# Patient Record
Sex: FEMALE | Race: BLACK OR AFRICAN AMERICAN | NOT HISPANIC OR LATINO | Employment: FULL TIME | URBAN - METROPOLITAN AREA
[De-identification: names, ages, dates, MRNs, and addresses within clinical notes are randomized per-mention and may not be internally consistent; named-entity substitution may affect disease eponyms.]

---

## 2021-10-20 ENCOUNTER — TELEPHONE (OUTPATIENT)
Dept: NEUROSURGERY | Facility: CLINIC | Age: 61
End: 2021-10-20

## 2021-10-20 NOTE — TELEPHONE ENCOUNTER
----- Message from Orestes Jennings sent at 10/20/2021 10:26 AM CDT -----  Contact: pt  Who Called: PT  Regarding: The pt is calling to schedule an initial appointment with the provider for lumbar spinal pain. Please contact the pt for scheduling.   Would the patient rather a call back or a response via [facility]? Call back  Best Call Back Number: [phone]  Additional Information:

## 2021-10-25 ENCOUNTER — LAB VISIT (OUTPATIENT)
Dept: LAB | Facility: HOSPITAL | Age: 61
End: 2021-10-25
Attending: STUDENT IN AN ORGANIZED HEALTH CARE EDUCATION/TRAINING PROGRAM
Payer: COMMERCIAL

## 2021-10-25 ENCOUNTER — OFFICE VISIT (OUTPATIENT)
Dept: NEUROSURGERY | Facility: CLINIC | Age: 61
End: 2021-10-25
Payer: COMMERCIAL

## 2021-10-25 ENCOUNTER — OFFICE VISIT (OUTPATIENT)
Dept: INTERNAL MEDICINE | Facility: CLINIC | Age: 61
End: 2021-10-25
Payer: COMMERCIAL

## 2021-10-25 VITALS
HEART RATE: 77 BPM | WEIGHT: 186.06 LBS | SYSTOLIC BLOOD PRESSURE: 130 MMHG | HEIGHT: 63 IN | OXYGEN SATURATION: 96 % | BODY MASS INDEX: 32.97 KG/M2 | DIASTOLIC BLOOD PRESSURE: 72 MMHG

## 2021-10-25 VITALS — DIASTOLIC BLOOD PRESSURE: 65 MMHG | HEART RATE: 81 BPM | SYSTOLIC BLOOD PRESSURE: 116 MMHG

## 2021-10-25 DIAGNOSIS — E08.00 DIABETES MELLITUS DUE TO UNDERLYING CONDITION WITH HYPEROSMOLARITY WITHOUT COMA, WITHOUT LONG-TERM CURRENT USE OF INSULIN: Primary | ICD-10-CM

## 2021-10-25 DIAGNOSIS — Z00.00 ANNUAL PHYSICAL EXAM: ICD-10-CM

## 2021-10-25 DIAGNOSIS — M43.16 SPONDYLOLISTHESIS AT L4-L5 LEVEL: Primary | ICD-10-CM

## 2021-10-25 DIAGNOSIS — E08.00 DIABETES MELLITUS DUE TO UNDERLYING CONDITION WITH HYPEROSMOLARITY WITHOUT COMA, WITHOUT LONG-TERM CURRENT USE OF INSULIN: ICD-10-CM

## 2021-10-25 LAB
ALBUMIN SERPL BCP-MCNC: 3.9 G/DL (ref 3.5–5.2)
ALP SERPL-CCNC: 67 U/L (ref 55–135)
ALT SERPL W/O P-5'-P-CCNC: 15 U/L (ref 10–44)
ANION GAP SERPL CALC-SCNC: 8 MMOL/L (ref 8–16)
AST SERPL-CCNC: 22 U/L (ref 10–40)
BILIRUB SERPL-MCNC: 0.6 MG/DL (ref 0.1–1)
BUN SERPL-MCNC: 13 MG/DL (ref 8–23)
CALCIUM SERPL-MCNC: 9.9 MG/DL (ref 8.7–10.5)
CHLORIDE SERPL-SCNC: 103 MMOL/L (ref 95–110)
CHOLEST SERPL-MCNC: 230 MG/DL (ref 120–199)
CHOLEST/HDLC SERPL: 3.1 {RATIO} (ref 2–5)
CO2 SERPL-SCNC: 28 MMOL/L (ref 23–29)
CREAT SERPL-MCNC: 0.9 MG/DL (ref 0.5–1.4)
EST. GFR  (AFRICAN AMERICAN): >60 ML/MIN/1.73 M^2
EST. GFR  (NON AFRICAN AMERICAN): >60 ML/MIN/1.73 M^2
ESTIMATED AVG GLUCOSE: 171 MG/DL (ref 68–131)
GLUCOSE SERPL-MCNC: 129 MG/DL (ref 70–110)
HBA1C MFR BLD: 7.6 % (ref 4–5.6)
HDLC SERPL-MCNC: 75 MG/DL (ref 40–75)
HDLC SERPL: 32.6 % (ref 20–50)
LDLC SERPL CALC-MCNC: 139.4 MG/DL (ref 63–159)
NONHDLC SERPL-MCNC: 155 MG/DL
POTASSIUM SERPL-SCNC: 3.9 MMOL/L (ref 3.5–5.1)
PROT SERPL-MCNC: 7.6 G/DL (ref 6–8.4)
SODIUM SERPL-SCNC: 139 MMOL/L (ref 136–145)
TRIGL SERPL-MCNC: 78 MG/DL (ref 30–150)

## 2021-10-25 PROCEDURE — 99203 OFFICE O/P NEW LOW 30 MIN: CPT | Mod: S$GLB,,, | Performed by: STUDENT IN AN ORGANIZED HEALTH CARE EDUCATION/TRAINING PROGRAM

## 2021-10-25 PROCEDURE — 36415 COLL VENOUS BLD VENIPUNCTURE: CPT | Performed by: STUDENT IN AN ORGANIZED HEALTH CARE EDUCATION/TRAINING PROGRAM

## 2021-10-25 PROCEDURE — 80053 COMPREHEN METABOLIC PANEL: CPT | Performed by: STUDENT IN AN ORGANIZED HEALTH CARE EDUCATION/TRAINING PROGRAM

## 2021-10-25 PROCEDURE — 3078F PR MOST RECENT DIASTOLIC BLOOD PRESSURE < 80 MM HG: ICD-10-PCS | Mod: CPTII,S$GLB,, | Performed by: STUDENT IN AN ORGANIZED HEALTH CARE EDUCATION/TRAINING PROGRAM

## 2021-10-25 PROCEDURE — 83036 HEMOGLOBIN GLYCOSYLATED A1C: CPT | Performed by: STUDENT IN AN ORGANIZED HEALTH CARE EDUCATION/TRAINING PROGRAM

## 2021-10-25 PROCEDURE — 99999 PR PBB SHADOW E&M-EST. PATIENT-LVL III: CPT | Mod: PBBFAC,,, | Performed by: STUDENT IN AN ORGANIZED HEALTH CARE EDUCATION/TRAINING PROGRAM

## 2021-10-25 PROCEDURE — 99203 PR OFFICE/OUTPT VISIT, NEW, LEVL III, 30-44 MIN: ICD-10-PCS | Mod: S$GLB,,, | Performed by: STUDENT IN AN ORGANIZED HEALTH CARE EDUCATION/TRAINING PROGRAM

## 2021-10-25 PROCEDURE — 3074F SYST BP LT 130 MM HG: CPT | Mod: CPTII,S$GLB,, | Performed by: STUDENT IN AN ORGANIZED HEALTH CARE EDUCATION/TRAINING PROGRAM

## 2021-10-25 PROCEDURE — 99999 PR PBB SHADOW E&M-EST. PATIENT-LVL IV: ICD-10-PCS | Mod: PBBFAC,,, | Performed by: STUDENT IN AN ORGANIZED HEALTH CARE EDUCATION/TRAINING PROGRAM

## 2021-10-25 PROCEDURE — 99999 PR PBB SHADOW E&M-EST. PATIENT-LVL IV: CPT | Mod: PBBFAC,,, | Performed by: STUDENT IN AN ORGANIZED HEALTH CARE EDUCATION/TRAINING PROGRAM

## 2021-10-25 PROCEDURE — 99999 PR PBB SHADOW E&M-EST. PATIENT-LVL III: ICD-10-PCS | Mod: PBBFAC,,, | Performed by: STUDENT IN AN ORGANIZED HEALTH CARE EDUCATION/TRAINING PROGRAM

## 2021-10-25 PROCEDURE — 3074F PR MOST RECENT SYSTOLIC BLOOD PRESSURE < 130 MM HG: ICD-10-PCS | Mod: CPTII,S$GLB,, | Performed by: STUDENT IN AN ORGANIZED HEALTH CARE EDUCATION/TRAINING PROGRAM

## 2021-10-25 PROCEDURE — 82570 ASSAY OF URINE CREATININE: CPT | Performed by: STUDENT IN AN ORGANIZED HEALTH CARE EDUCATION/TRAINING PROGRAM

## 2021-10-25 PROCEDURE — 80061 LIPID PANEL: CPT | Performed by: STUDENT IN AN ORGANIZED HEALTH CARE EDUCATION/TRAINING PROGRAM

## 2021-10-25 PROCEDURE — 3078F DIAST BP <80 MM HG: CPT | Mod: CPTII,S$GLB,, | Performed by: STUDENT IN AN ORGANIZED HEALTH CARE EDUCATION/TRAINING PROGRAM

## 2021-10-25 RX ORDER — DULAGLUTIDE 3 MG/.5ML
INJECTION, SOLUTION SUBCUTANEOUS
COMMUNITY
Start: 2021-09-29 | End: 2021-10-25 | Stop reason: SDUPTHER

## 2021-10-25 RX ORDER — METFORMIN HYDROCHLORIDE 1000 MG/1
TABLET ORAL
COMMUNITY
End: 2021-10-25 | Stop reason: SDUPTHER

## 2021-10-25 RX ORDER — LISINOPRIL AND HYDROCHLOROTHIAZIDE 12.5; 2 MG/1; MG/1
TABLET ORAL
COMMUNITY
End: 2021-10-25 | Stop reason: SDUPTHER

## 2021-10-25 RX ORDER — METFORMIN HYDROCHLORIDE 1000 MG/1
1000 TABLET ORAL
Qty: 90 TABLET | Refills: 0 | Status: SHIPPED | OUTPATIENT
Start: 2021-10-25 | End: 2022-12-24

## 2021-10-25 RX ORDER — LISINOPRIL AND HYDROCHLOROTHIAZIDE 12.5; 2 MG/1; MG/1
TABLET ORAL
Qty: 90 TABLET | Refills: 3 | Status: SHIPPED | OUTPATIENT
Start: 2021-10-25 | End: 2024-01-17

## 2021-10-25 RX ORDER — DULAGLUTIDE 3 MG/.5ML
3 INJECTION, SOLUTION SUBCUTANEOUS
Qty: 4 PEN | Refills: 11 | Status: SHIPPED | OUTPATIENT
Start: 2021-10-25 | End: 2022-10-25

## 2021-10-26 ENCOUNTER — PATIENT OUTREACH (OUTPATIENT)
Dept: ADMINISTRATIVE | Facility: HOSPITAL | Age: 61
End: 2021-10-26
Payer: COMMERCIAL

## 2021-10-26 LAB
ALBUMIN/CREAT UR: 4.6 UG/MG (ref 0–30)
CREAT UR-MCNC: 197 MG/DL (ref 15–325)
MICROALBUMIN UR DL<=1MG/L-MCNC: 9 UG/ML

## 2021-10-27 ENCOUNTER — TELEPHONE (OUTPATIENT)
Dept: NEUROLOGY | Facility: CLINIC | Age: 61
End: 2021-10-27
Payer: COMMERCIAL

## 2021-10-28 ENCOUNTER — TELEPHONE (OUTPATIENT)
Dept: NEUROSURGERY | Facility: CLINIC | Age: 61
End: 2021-10-28
Payer: COMMERCIAL

## 2021-10-28 ENCOUNTER — TELEPHONE (OUTPATIENT)
Dept: INTERNAL MEDICINE | Facility: CLINIC | Age: 61
End: 2021-10-28
Payer: COMMERCIAL

## 2021-10-28 DIAGNOSIS — E08.00 DIABETES MELLITUS DUE TO UNDERLYING CONDITION WITH HYPEROSMOLARITY WITHOUT COMA, WITHOUT LONG-TERM CURRENT USE OF INSULIN: Primary | ICD-10-CM

## 2021-10-28 DIAGNOSIS — J45.20 MILD INTERMITTENT ASTHMA WITHOUT COMPLICATION: ICD-10-CM

## 2021-10-28 RX ORDER — ATORVASTATIN CALCIUM 20 MG/1
20 TABLET, FILM COATED ORAL DAILY
Qty: 90 TABLET | Refills: 3 | Status: SHIPPED | OUTPATIENT
Start: 2021-10-28 | End: 2022-12-24

## 2021-10-28 RX ORDER — ALBUTEROL SULFATE 90 UG/1
2 AEROSOL, METERED RESPIRATORY (INHALATION) EVERY 6 HOURS PRN
Qty: 18 G | Refills: 0 | Status: SHIPPED | OUTPATIENT
Start: 2021-10-28 | End: 2022-12-24

## 2021-11-02 ENCOUNTER — CLINICAL SUPPORT (OUTPATIENT)
Dept: DIABETES | Facility: CLINIC | Age: 61
End: 2021-11-02
Payer: COMMERCIAL

## 2021-11-02 ENCOUNTER — HOSPITAL ENCOUNTER (OUTPATIENT)
Dept: RADIOLOGY | Facility: HOSPITAL | Age: 61
Discharge: HOME | End: 2021-11-02
Attending: STUDENT IN AN ORGANIZED HEALTH CARE EDUCATION/TRAINING PROGRAM
Payer: COMMERCIAL

## 2021-11-02 DIAGNOSIS — M43.16 SPONDYLOLISTHESIS AT L4-L5 LEVEL: ICD-10-CM

## 2021-11-02 DIAGNOSIS — E08.00 DIABETES MELLITUS DUE TO UNDERLYING CONDITION WITH HYPEROSMOLARITY WITHOUT COMA, WITHOUT LONG-TERM CURRENT USE OF INSULIN: ICD-10-CM

## 2021-11-02 PROCEDURE — 72082 X-RAY EXAM ENTIRE SPI 2/3 VW: CPT | Mod: TC

## 2021-11-02 PROCEDURE — 72110 XR LUMBAR SPINE 5 VIEW WITH FLEX AND EXT: ICD-10-PCS | Mod: 26,,, | Performed by: RADIOLOGY

## 2021-11-02 PROCEDURE — G0108 DIAB MANAGE TRN  PER INDIV: HCPCS | Mod: S$GLB,,, | Performed by: DIETITIAN, REGISTERED

## 2021-11-02 PROCEDURE — G0108 PR DIAB MANAGE TRN  PER INDIV: ICD-10-PCS | Mod: S$GLB,,, | Performed by: DIETITIAN, REGISTERED

## 2021-11-02 PROCEDURE — 72110 X-RAY EXAM L-2 SPINE 4/>VWS: CPT | Mod: 26,,, | Performed by: RADIOLOGY

## 2021-11-02 PROCEDURE — 72082 X-RAY EXAM ENTIRE SPI 2/3 VW: CPT | Mod: 26,,, | Performed by: RADIOLOGY

## 2021-11-02 PROCEDURE — 72082 XR SCOLIOSIS COMPLETE: ICD-10-PCS | Mod: 26,,, | Performed by: RADIOLOGY

## 2021-11-02 PROCEDURE — 72110 X-RAY EXAM L-2 SPINE 4/>VWS: CPT | Mod: 59,TC

## 2021-11-05 ENCOUNTER — TELEPHONE (OUTPATIENT)
Dept: NEUROSURGERY | Facility: CLINIC | Age: 61
End: 2021-11-05
Payer: COMMERCIAL

## 2021-11-12 ENCOUNTER — TELEPHONE (OUTPATIENT)
Dept: NEUROSURGERY | Facility: CLINIC | Age: 61
End: 2021-11-12
Payer: COMMERCIAL

## 2021-12-22 ENCOUNTER — TELEPHONE (OUTPATIENT)
Dept: ADMINISTRATIVE | Facility: OTHER | Age: 61
End: 2021-12-22
Payer: COMMERCIAL

## 2022-01-10 ENCOUNTER — TELEPHONE (OUTPATIENT)
Dept: ADMINISTRATIVE | Facility: OTHER | Age: 62
End: 2022-01-10
Payer: COMMERCIAL

## 2022-12-24 ENCOUNTER — OFFICE VISIT (OUTPATIENT)
Dept: URGENT CARE | Facility: CLINIC | Age: 62
End: 2022-12-24
Payer: MEDICARE

## 2022-12-24 VITALS
BODY MASS INDEX: 31.01 KG/M2 | DIASTOLIC BLOOD PRESSURE: 70 MMHG | WEIGHT: 175 LBS | TEMPERATURE: 100 F | HEART RATE: 87 BPM | RESPIRATION RATE: 16 BRPM | OXYGEN SATURATION: 98 % | HEIGHT: 63 IN | SYSTOLIC BLOOD PRESSURE: 122 MMHG

## 2022-12-24 DIAGNOSIS — J02.9 SORE THROAT: ICD-10-CM

## 2022-12-24 DIAGNOSIS — I10 PRIMARY HYPERTENSION: ICD-10-CM

## 2022-12-24 DIAGNOSIS — U07.1 COVID-19 VIRUS DETECTED: ICD-10-CM

## 2022-12-24 DIAGNOSIS — E11.9 CONTROLLED TYPE 2 DIABETES MELLITUS WITHOUT COMPLICATION, WITHOUT LONG-TERM CURRENT USE OF INSULIN: ICD-10-CM

## 2022-12-24 DIAGNOSIS — U07.1 COVID-19 VIRUS INFECTION: Primary | ICD-10-CM

## 2022-12-24 LAB
CTP QC/QA: YES
SARS-COV-2 AG RESP QL IA.RAPID: POSITIVE

## 2022-12-24 PROCEDURE — 99214 PR OFFICE/OUTPT VISIT, EST, LEVL IV, 30-39 MIN: ICD-10-PCS | Mod: CR,S$GLB,, | Performed by: SURGERY

## 2022-12-24 PROCEDURE — 87811 SARS-COV-2 COVID19 W/OPTIC: CPT | Mod: QW,CR,S$GLB, | Performed by: SURGERY

## 2022-12-24 PROCEDURE — 99214 OFFICE O/P EST MOD 30 MIN: CPT | Mod: CR,S$GLB,, | Performed by: SURGERY

## 2022-12-24 PROCEDURE — 87811 SARS CORONAVIRUS 2 ANTIGEN POCT, MANUAL READ: ICD-10-PCS | Mod: QW,CR,S$GLB, | Performed by: SURGERY

## 2022-12-24 RX ORDER — EMPAGLIFLOZIN, METFORMIN HYDROCHLORIDE 12.5; 1 MG/1; MG/1
1 TABLET, EXTENDED RELEASE ORAL EVERY MORNING
COMMUNITY
Start: 2022-09-02 | End: 2024-01-10 | Stop reason: ALTCHOICE

## 2022-12-24 RX ORDER — ERGOCALCIFEROL 1.25 MG/1
50000 CAPSULE ORAL
COMMUNITY
Start: 2022-09-14 | End: 2024-01-10 | Stop reason: ALTCHOICE

## 2022-12-24 RX ORDER — ORAL SEMAGLUTIDE 7 MG/1
7 TABLET ORAL EVERY MORNING
COMMUNITY
Start: 2022-12-02 | End: 2024-01-10 | Stop reason: ALTCHOICE

## 2022-12-24 RX ORDER — TIRZEPATIDE 2.5 MG/.5ML
INJECTION, SOLUTION SUBCUTANEOUS
COMMUNITY
Start: 2022-09-27 | End: 2024-01-10

## 2022-12-24 RX ORDER — NAPROXEN 500 MG/1
TABLET ORAL
COMMUNITY
End: 2024-01-10 | Stop reason: ALTCHOICE

## 2022-12-24 RX ORDER — IBUPROFEN 800 MG/1
800 TABLET ORAL 2 TIMES DAILY
COMMUNITY
Start: 2022-11-03

## 2022-12-24 RX ORDER — TRAMADOL HYDROCHLORIDE 50 MG/1
50 TABLET ORAL EVERY 6 HOURS PRN
COMMUNITY
Start: 2022-10-25 | End: 2024-01-10 | Stop reason: ALTCHOICE

## 2022-12-24 NOTE — PROGRESS NOTES
"Subjective:       Patient ID: Monica Knight is a 62 y.o. female.    Vitals:  height is 5' 3" (1.6 m) and weight is 79.4 kg (175 lb). Her temperature is 100.3 °F (37.9 °C). Her blood pressure is 122/70 and her pulse is 87. Her respiration is 16 and oxygen saturation is 98%.     Chief Complaint: COVID-19 Concerns    63yo female presents with c/o sore throat and positive home COVID test today. Symptoms started 2-3 days ago. Taking tylenol for fever and alkaseltzer cold medicine with moderate relief.     Sore Throat   This is a new problem. The current episode started yesterday. The problem has been gradually worsening. Neither side of throat is experiencing more pain than the other. The maximum temperature recorded prior to her arrival was 101 - 101.9 F (Tmax 101). The pain is at a severity of 5/10. The pain is moderate. Associated symptoms include coughing and headaches. Pertinent negatives include no abdominal pain, congestion, diarrhea, drooling, ear discharge, ear pain, hoarse voice, plugged ear sensation, neck pain, shortness of breath, stridor, swollen glands, trouble swallowing or vomiting. She has tried acetaminophen (robitussin, alex-seltzer) for the symptoms. The treatment provided mild relief.     HENT:  Positive for sore throat. Negative for ear pain, ear discharge, drooling, congestion and trouble swallowing.    Neck: Negative for neck pain.   Respiratory:  Positive for cough. Negative for shortness of breath and stridor.    Gastrointestinal:  Negative for abdominal pain, vomiting and diarrhea.   Neurological:  Positive for headaches.     Objective:      Physical Exam   Constitutional: She is oriented to person, place, and time. She appears well-developed. She is cooperative.  Non-toxic appearance. She does not appear ill. No distress.   HENT:   Head: Normocephalic and atraumatic.   Ears:   Right Ear: Hearing, tympanic membrane, external ear and ear canal normal.   Left Ear: Hearing, tympanic membrane, " external ear and ear canal normal.   Nose: Nose normal. No mucosal edema, rhinorrhea or nasal deformity. No epistaxis. Right sinus exhibits no maxillary sinus tenderness and no frontal sinus tenderness. Left sinus exhibits no maxillary sinus tenderness and no frontal sinus tenderness.   Mouth/Throat: Uvula is midline, oropharynx is clear and moist and mucous membranes are normal. No trismus in the jaw. Normal dentition. No uvula swelling. No oropharyngeal exudate, posterior oropharyngeal edema or posterior oropharyngeal erythema.   Eyes: Conjunctivae and lids are normal. No scleral icterus.   Neck: Trachea normal and phonation normal. Neck supple. No edema present. No erythema present. No neck rigidity present.   Cardiovascular: Normal rate, regular rhythm, normal heart sounds and normal pulses.   Pulmonary/Chest: Effort normal and breath sounds normal. No respiratory distress. She has no decreased breath sounds. She has no rhonchi.   Abdominal: Normal appearance.   Musculoskeletal: Normal range of motion.         General: No deformity. Normal range of motion.   Neurological: She is alert and oriented to person, place, and time. She exhibits normal muscle tone. Coordination normal.   Skin: Skin is warm, dry, intact, not diaphoretic and not pale.   Psychiatric: Her speech is normal and behavior is normal. Judgment and thought content normal.   Nursing note and vitals reviewed.      Assessment:       1. COVID-19 virus infection    2. Sore throat    3. Primary hypertension    4. Controlled type 2 diabetes mellitus without complication, without long-term current use of insulin          Plan:         COVID-19 virus infection  -     nirmatrelvir-ritonavir 300 mg (150 mg x 2)-100 mg copackaged tablets (EUA); Take 3 tablets by mouth 2 (two) times daily for 5 days. Each dose contains 2 nirmatrelvir (pink tablets) and 1 ritonavir (white tablet). Take all 3 tablets together  Dispense: 30 tablet; Refill: 0    Sore throat  -      SARS Coronavirus 2 Antigen, POCT Manual Read    Primary hypertension    Controlled type 2 diabetes mellitus without complication, without long-term current use of insulin

## 2023-12-26 ENCOUNTER — TELEPHONE (OUTPATIENT)
Dept: INTERNAL MEDICINE | Facility: CLINIC | Age: 63
End: 2023-12-26
Payer: MEDICARE

## 2023-12-26 NOTE — TELEPHONE ENCOUNTER
LVM offering to help schedule Pt for an appointment but had questions as Pt. has NEVER SEEN ANY PROVIDER c INT. MEDICINE. Will message via portal.

## 2023-12-26 NOTE — TELEPHONE ENCOUNTER
----- Message from Mona Hilario sent at 12/22/2023 12:57 PM CST -----  Name of Who is Calling:BRIDGETT COPOER [[id number]]                   What is the request in detail:PT needs to schedule an appt to be seen please assist                   Can the clinic reply by [facility]: No                   What Number to Call Back if not in [facility]: [phone]

## 2024-01-10 ENCOUNTER — LAB VISIT (OUTPATIENT)
Dept: LAB | Facility: HOSPITAL | Age: 64
End: 2024-01-10
Payer: MEDICARE

## 2024-01-10 ENCOUNTER — OFFICE VISIT (OUTPATIENT)
Dept: INTERNAL MEDICINE | Facility: CLINIC | Age: 64
End: 2024-01-10
Payer: MEDICARE

## 2024-01-10 VITALS
DIASTOLIC BLOOD PRESSURE: 84 MMHG | SYSTOLIC BLOOD PRESSURE: 142 MMHG | OXYGEN SATURATION: 94 % | WEIGHT: 192.44 LBS | BODY MASS INDEX: 34.1 KG/M2 | HEART RATE: 80 BPM | HEIGHT: 63 IN

## 2024-01-10 DIAGNOSIS — M48.02 CERVICAL STENOSIS OF SPINE: ICD-10-CM

## 2024-01-10 DIAGNOSIS — I10 PRIMARY HYPERTENSION: ICD-10-CM

## 2024-01-10 DIAGNOSIS — M43.16 SPONDYLOLISTHESIS, LUMBAR REGION: ICD-10-CM

## 2024-01-10 DIAGNOSIS — E11.9 TYPE 2 DIABETES MELLITUS WITHOUT COMPLICATION, WITHOUT LONG-TERM CURRENT USE OF INSULIN: ICD-10-CM

## 2024-01-10 DIAGNOSIS — R09.82 POSTNASAL DRIP: ICD-10-CM

## 2024-01-10 DIAGNOSIS — Z00.00 GENERAL MEDICAL EXAM: Primary | ICD-10-CM

## 2024-01-10 DIAGNOSIS — M48.061 SPINAL STENOSIS OF LUMBAR REGION WITHOUT NEUROGENIC CLAUDICATION: ICD-10-CM

## 2024-01-10 DIAGNOSIS — M50.20 CERVICAL DISC HERNIATION: ICD-10-CM

## 2024-01-10 LAB
ALBUMIN SERPL BCP-MCNC: 3.8 G/DL (ref 3.5–5.2)
ALP SERPL-CCNC: 63 U/L (ref 55–135)
ALT SERPL W/O P-5'-P-CCNC: 24 U/L (ref 10–44)
ANION GAP SERPL CALC-SCNC: 7 MMOL/L (ref 8–16)
AST SERPL-CCNC: 23 U/L (ref 10–40)
BASOPHILS # BLD AUTO: 0.06 K/UL (ref 0–0.2)
BASOPHILS NFR BLD: 1.3 % (ref 0–1.9)
BILIRUB SERPL-MCNC: 0.4 MG/DL (ref 0.1–1)
BUN SERPL-MCNC: 9 MG/DL (ref 8–23)
CALCIUM SERPL-MCNC: 9.3 MG/DL (ref 8.7–10.5)
CHLORIDE SERPL-SCNC: 107 MMOL/L (ref 95–110)
CHOLEST SERPL-MCNC: 215 MG/DL (ref 120–199)
CHOLEST/HDLC SERPL: 3 {RATIO} (ref 2–5)
CO2 SERPL-SCNC: 25 MMOL/L (ref 23–29)
CREAT SERPL-MCNC: 0.9 MG/DL (ref 0.5–1.4)
DIFFERENTIAL METHOD BLD: NORMAL
EOSINOPHIL # BLD AUTO: 0.2 K/UL (ref 0–0.5)
EOSINOPHIL NFR BLD: 4.2 % (ref 0–8)
ERYTHROCYTE [DISTWIDTH] IN BLOOD BY AUTOMATED COUNT: 12.5 % (ref 11.5–14.5)
EST. GFR  (NO RACE VARIABLE): >60 ML/MIN/1.73 M^2
ESTIMATED AVG GLUCOSE: 189 MG/DL (ref 68–131)
GLUCOSE SERPL-MCNC: 328 MG/DL (ref 70–110)
HBA1C MFR BLD: 8.2 % (ref 4–5.6)
HCT VFR BLD AUTO: 39.2 % (ref 37–48.5)
HDLC SERPL-MCNC: 71 MG/DL (ref 40–75)
HDLC SERPL: 33 % (ref 20–50)
HGB BLD-MCNC: 12.9 G/DL (ref 12–16)
IMM GRANULOCYTES # BLD AUTO: 0.01 K/UL (ref 0–0.04)
IMM GRANULOCYTES NFR BLD AUTO: 0.2 % (ref 0–0.5)
LDLC SERPL CALC-MCNC: 128.2 MG/DL (ref 63–159)
LYMPHOCYTES # BLD AUTO: 1.8 K/UL (ref 1–4.8)
LYMPHOCYTES NFR BLD: 37.1 % (ref 18–48)
MCH RBC QN AUTO: 30.9 PG (ref 27–31)
MCHC RBC AUTO-ENTMCNC: 32.9 G/DL (ref 32–36)
MCV RBC AUTO: 94 FL (ref 82–98)
MONOCYTES # BLD AUTO: 0.3 K/UL (ref 0.3–1)
MONOCYTES NFR BLD: 6.3 % (ref 4–15)
NEUTROPHILS # BLD AUTO: 2.4 K/UL (ref 1.8–7.7)
NEUTROPHILS NFR BLD: 50.9 % (ref 38–73)
NONHDLC SERPL-MCNC: 144 MG/DL
NRBC BLD-RTO: 0 /100 WBC
PLATELET # BLD AUTO: 271 K/UL (ref 150–450)
PMV BLD AUTO: 11.6 FL (ref 9.2–12.9)
POTASSIUM SERPL-SCNC: 4 MMOL/L (ref 3.5–5.1)
PROT SERPL-MCNC: 7.1 G/DL (ref 6–8.4)
RBC # BLD AUTO: 4.17 M/UL (ref 4–5.4)
SODIUM SERPL-SCNC: 139 MMOL/L (ref 136–145)
TRIGL SERPL-MCNC: 79 MG/DL (ref 30–150)
TSH SERPL DL<=0.005 MIU/L-ACNC: 0.84 UIU/ML (ref 0.4–4)
WBC # BLD AUTO: 4.77 K/UL (ref 3.9–12.7)

## 2024-01-10 PROCEDURE — 83036 HEMOGLOBIN GLYCOSYLATED A1C: CPT

## 2024-01-10 PROCEDURE — 1159F MED LIST DOCD IN RCRD: CPT | Mod: CPTII,S$GLB,,

## 2024-01-10 PROCEDURE — 84443 ASSAY THYROID STIM HORMONE: CPT

## 2024-01-10 PROCEDURE — 85025 COMPLETE CBC W/AUTO DIFF WBC: CPT

## 2024-01-10 PROCEDURE — 99999 PR PBB SHADOW E&M-EST. PATIENT-LVL V: CPT | Mod: PBBFAC,,,

## 2024-01-10 PROCEDURE — 3077F SYST BP >= 140 MM HG: CPT | Mod: CPTII,S$GLB,,

## 2024-01-10 PROCEDURE — 3079F DIAST BP 80-89 MM HG: CPT | Mod: CPTII,S$GLB,,

## 2024-01-10 PROCEDURE — 36415 COLL VENOUS BLD VENIPUNCTURE: CPT

## 2024-01-10 PROCEDURE — 80061 LIPID PANEL: CPT

## 2024-01-10 PROCEDURE — 80053 COMPREHEN METABOLIC PANEL: CPT

## 2024-01-10 PROCEDURE — 1160F RVW MEDS BY RX/DR IN RCRD: CPT | Mod: CPTII,S$GLB,,

## 2024-01-10 PROCEDURE — 3008F BODY MASS INDEX DOCD: CPT | Mod: CPTII,S$GLB,,

## 2024-01-10 PROCEDURE — 99214 OFFICE O/P EST MOD 30 MIN: CPT | Mod: S$GLB,,,

## 2024-01-10 RX ORDER — HYDROCODONE BITARTRATE AND ACETAMINOPHEN 5; 325 MG/1; MG/1
1 TABLET ORAL EVERY 6 HOURS PRN
COMMUNITY

## 2024-01-10 RX ORDER — TIRZEPATIDE 5 MG/.5ML
5 INJECTION, SOLUTION SUBCUTANEOUS
Qty: 4 PEN | Refills: 1 | Status: SHIPPED | OUTPATIENT
Start: 2024-01-10 | End: 2024-01-17 | Stop reason: SDUPTHER

## 2024-01-10 RX ORDER — CETIRIZINE HYDROCHLORIDE 10 MG/1
10 TABLET ORAL DAILY
Qty: 90 TABLET | Refills: 1 | Status: SHIPPED | OUTPATIENT
Start: 2024-01-10 | End: 2025-01-09

## 2024-01-10 NOTE — PROGRESS NOTES
"INTERNAL MEDICINE PROGRESS/URGENT CARE NOTE         Patient: Monica Knight  MRN: [id number]  : 1960  PCP: Endy Haddad MD      CHIEF COMPLAINT     Chief Complaint   Patient presents with    Diabetes       HPI     Monica Knight is a 63 y.o. female with T2DM, HTN, chronic neck and back pain who presents for an urgent visit today. Wants to follow-up on her diabetes. She states she recently relocated back home from [place]. She was taking Mounjaro 5mg, Metformin 1000mg bid, glipizide 5mg bid. She states she's been out of the mounjaro for the last 3 months bc she fell into the "donut hole." She last had labs done about 4 months ago and she states her last A1c was between 7-8%.    Chronic back pain (started ~10 years ago) and neck pain (started 1 y ago  -F/w Pain Mgmt (Dr. Juwan Lal at [facility], [place]) for chronic back pain. Was given hydrocodone recently and uses them sparingly. Ibuprofen 800mg  prn and occasionally ibuprofen dual prn. Has had injections in her back and neck for this pain.    Also has c/o scratchy throat. Was seen at [facility] minute clinic 3 weeks ago when she had scratchy throat and wheezing. She states she's doing better today but continues with scratchy throat    Past Medical History:  No past medical history on file.     Past Surgical History:  No past surgical history on file.     Allergies:  Review of patient's allergies indicates:   Allergen Reactions    Peanut Swelling     Throat swells and gets hives  Throat swells and gets hives  Throat swells and gets hives         Home Medications:    Current Outpatient Medications:     ibuprofen (ADVIL,MOTRIN) 800 MG tablet, Take 800 mg by mouth 2 (two) times daily., Disp: , Rfl:     cetirizine (ZYRTEC) 10 MG tablet, Take 1 tablet (10 mg total) by mouth once daily., Disp: 90 tablet, Rfl: 1    HYDROcodone-acetaminophen (NORCO) 5-325 mg per tablet, Take 1 tablet by mouth every 6 (six) hours as needed for Pain., Disp: , Rfl:     " "lisinopriL-hydrochlorothiazide (PRINZIDE,ZESTORETIC) 20-12.5 mg per tablet, lisinopril 20 mg-hydrochlorothiazide 12.5 mg tablet, Disp: 90 tablet, Rfl: 3    tirzepatide (MOUNJARO) 5 mg/0.5 mL PnIj, Inject 5 mg into the skin every 7 days., Disp: 4 Pen, Rfl: 1     Review of Systems:  Review of Systems   Constitutional:  Negative for fever.   HENT:  Positive for postnasal drip.    Respiratory:  Negative for chest tightness and shortness of breath.    Cardiovascular:  Negative for chest pain.   Gastrointestinal:  Negative for abdominal pain, blood in stool, diarrhea, nausea and vomiting.   Musculoskeletal:  Positive for back pain and neck pain. Negative for myalgias.   Neurological:  Negative for dizziness, weakness and headaches.   Psychiatric/Behavioral:  Negative for suicidal ideas.          PHYSICAL EXAM     Vitals:    01/10/24 1026   BP: (!) 142/84   BP Location: Left arm   Patient Position: Sitting   BP Method: Medium (Manual)   Pulse: 80   SpO2: (!) 94%   Weight: 87.3 kg (192 lb 7.4 oz)   Height: 5' 3" (1.6 m)      Body mass index is 34.09 kg/m².     Physical Exam  Constitutional:       General: She is not in acute distress.     Appearance: Normal appearance. She is not ill-appearing, toxic-appearing or diaphoretic.   HENT:      Head: Normocephalic.      Comments: Frontal and maxillary sinuses nttp     Right Ear: Tympanic membrane and ear canal normal.      Left Ear: Tympanic membrane and ear canal normal.      Nose: Congestion present.      Mouth/Throat:      Pharynx: Oropharynx is clear. No oropharyngeal exudate or posterior oropharyngeal erythema.   Eyes:      Extraocular Movements: Extraocular movements intact.      Pupils: Pupils are equal, round, and reactive to light.   Cardiovascular:      Rate and Rhythm: Normal rate and regular rhythm.      Heart sounds: Normal heart sounds.   Pulmonary:      Effort: Pulmonary effort is normal. No respiratory distress.      Breath sounds: Normal breath sounds. No " wheezing.   Abdominal:      General: Bowel sounds are normal.      Palpations: Abdomen is soft.   Musculoskeletal:         General: Normal range of motion.      Cervical back: Normal range of motion and neck supple. No rigidity or tenderness.   Lymphadenopathy:      Cervical: No cervical adenopathy.   Skin:     General: Skin is warm and dry.   Neurological:      General: No focal deficit present.      Mental Status: She is alert and oriented to person, place, and time.         LABS     Lab Results   Component Value Date    HGBA1C 7.6 (H) 10/25/2021     CMP  Sodium   Date Value Ref Range Status   10/25/2021 139 136 - 145 mmol/L Final     Potassium   Date Value Ref Range Status   10/25/2021 3.9 3.5 - 5.1 mmol/L Final     Chloride   Date Value Ref Range Status   10/25/2021 103 95 - 110 mmol/L Final     CO2   Date Value Ref Range Status   10/25/2021 28 23 - 29 mmol/L Final     Glucose   Date Value Ref Range Status   10/25/2021 129 (H) 70 - 110 mg/dL Final     BUN   Date Value Ref Range Status   10/25/2021 13 8 - 23 mg/dL Final     Creatinine   Date Value Ref Range Status   10/25/2021 0.9 0.5 - 1.4 mg/dL Final     Calcium   Date Value Ref Range Status   10/25/2021 9.9 8.7 - 10.5 mg/dL Final     Total Protein   Date Value Ref Range Status   10/25/2021 7.6 6.0 - 8.4 g/dL Final     Albumin   Date Value Ref Range Status   10/25/2021 3.9 3.5 - 5.2 g/dL Final     Total Bilirubin   Date Value Ref Range Status   10/25/2021 0.6 0.1 - 1.0 mg/dL Final     Comment:     For infants and newborns, interpretation of results should be based  on gestational age, weight and in agreement with clinical  observations.    Premature Infant recommended reference ranges:  Up to 24 hours.............<8.0 mg/dL  Up to 48 hours............<12.0 mg/dL  3-5 days..................<15.0 mg/dL  6-29 days.................<15.0 mg/dL       Alkaline Phosphatase   Date Value Ref Range Status   10/25/2021 67 55 - 135 U/L Final     AST   Date Value Ref Range  "Status   10/25/2021 22 10 - 40 U/L Final     ALT   Date Value Ref Range Status   10/25/2021 15 10 - 44 U/L Final     Anion Gap   Date Value Ref Range Status   10/25/2021 8 8 - 16 mmol/L Final     eGFR if    Date Value Ref Range Status   10/25/2021 >60.0 >60 mL/min/1.73 m^2 Final     eGFR if non    Date Value Ref Range Status   10/25/2021 >60.0 >60 mL/min/1.73 m^2 Final     Comment:     Calculation used to obtain the estimated glomerular filtration  rate (eGFR) is the CKD-EPI equation.        No results found for: "WBC", "HGB", "HCT", "MCV", "PLT"  Lab Results   Component Value Date    CHOL 230 (H) 10/25/2021     Lab Results   Component Value Date    HDL 75 10/25/2021     Lab Results   Component Value Date    LDLCALC 139.4 10/25/2021     Lab Results   Component Value Date    TRIG 78 10/25/2021     Lab Results   Component Value Date    CHOLHDL 32.6 10/25/2021     No results found for: "TSH", "J2ZBQHQ", "H7WKGQM", "THYROIDAB"    ASSESSMENT & PLAN       1. General medical exam  Comments:  PE and VSS. Encouraged heart healthy diet and regular exercise. Labs ordered.    2. Type 2 diabetes mellitus without complication, without long-term current use of insulin  Comments:  Cont present mgmt. Will reorder mounjaro. Labs ordered. Oph and Diabetic referral placed. RTC 3 mos  Orders:  -     tirzepatide (MOUNJARO) 5 mg/0.5 mL PnIj; Inject 5 mg into the skin every 7 days.  Dispense: 4 Pen; Refill: 1  -     Ambulatory referral/consult to Ophthalmology; Future; Expected date: 01/17/2024  -     Ambulatory Referral/Consult to Primary Care Diabetic Management; Future; Expected date: 01/17/2024  -     Comprehensive Metabolic Panel; Future; Expected date: 01/10/2024  -     Hemoglobin A1C; Future; Expected date: 01/10/2024  -     Lipid Panel; Future; Expected date: 01/10/2024  -     Microalbumin/Creatinine Ratio, Urine; Future; Expected date: 01/10/2024    3. Primary hypertension  Comments:  Stable. Cont " present mgmt. Low na diet, reg exercise. Labs ordered. Monitor bp for goal <140/90. RTC prn  Orders:  -     CBC Auto Differential; Future; Expected date: 01/10/2024  -     TSH; Future; Expected date: 01/10/2024    4. Spondylolisthesis, lumbar region  Comments:  Stable. Cont present mgmt. Referral placed to Pain Med  Orders:  -     Ambulatory referral/consult to Pain Clinic; Future; Expected date: 01/17/2024    5. Spinal stenosis of lumbar region without neurogenic claudication  Comments:  As above  Orders:  -     Ambulatory referral/consult to Pain Clinic; Future; Expected date: 01/17/2024    6. Cervical stenosis of spine  Comments:  As above  Orders:  -     Ambulatory referral/consult to Pain Clinic; Future; Expected date: 01/17/2024    7. Cervical disc herniation  Comments:  As above    8. Postnasal drip  Comments:  PE stable. Recommend zyrtec, flonase prn. Warm saltwater gargles, warm teas w honey and lemon.  Orders:  -     cetirizine (ZYRTEC) 10 MG tablet; Take 1 tablet (10 mg total) by mouth once daily.  Dispense: 90 tablet; Refill: 1     Patient plans to rtc in ~3 mos to establish care with PCP.    Follow up in about 3 months (around 4/10/2024), or if symptoms worsen or fail to improve.    Patient was counseled on when to seek emergent care. Patient's plan/treatment was discussed including medications and possible side effects. Verbalized understanding of all instructions.            SERGIO Pop, FNP-C  Department of Internal Medicine  [facility]  [address] [place] [address]  Ph: [phone]  01/10/2024 10:23 AM

## 2024-01-11 DIAGNOSIS — E78.2 MIXED HYPERLIPIDEMIA: Primary | ICD-10-CM

## 2024-01-11 DIAGNOSIS — Z00.00 ENCOUNTER FOR MEDICARE ANNUAL WELLNESS EXAM: ICD-10-CM

## 2024-01-11 RX ORDER — ROSUVASTATIN CALCIUM 20 MG/1
20 TABLET, COATED ORAL DAILY
Qty: 90 TABLET | Refills: 3 | Status: SHIPPED | OUTPATIENT
Start: 2024-01-11 | End: 2024-01-17 | Stop reason: SDUPTHER

## 2024-01-17 ENCOUNTER — HOSPITAL ENCOUNTER (OUTPATIENT)
Dept: RADIOLOGY | Facility: OTHER | Age: 64
Discharge: HOME | End: 2024-01-17
Attending: STUDENT IN AN ORGANIZED HEALTH CARE EDUCATION/TRAINING PROGRAM
Payer: MEDICARE

## 2024-01-17 ENCOUNTER — OFFICE VISIT (OUTPATIENT)
Dept: SPINE | Facility: CLINIC | Age: 64
End: 2024-01-17
Payer: MEDICARE

## 2024-01-17 VITALS
BODY MASS INDEX: 34.02 KG/M2 | HEART RATE: 82 BPM | HEIGHT: 63 IN | SYSTOLIC BLOOD PRESSURE: 131 MMHG | DIASTOLIC BLOOD PRESSURE: 61 MMHG | WEIGHT: 192 LBS | RESPIRATION RATE: 12 BRPM

## 2024-01-17 DIAGNOSIS — E11.9 TYPE 2 DIABETES MELLITUS WITHOUT COMPLICATION, WITHOUT LONG-TERM CURRENT USE OF INSULIN: ICD-10-CM

## 2024-01-17 DIAGNOSIS — M48.061 SPINAL STENOSIS OF LUMBAR REGION WITHOUT NEUROGENIC CLAUDICATION: ICD-10-CM

## 2024-01-17 DIAGNOSIS — M48.02 CERVICAL STENOSIS OF SPINE: ICD-10-CM

## 2024-01-17 DIAGNOSIS — E78.2 MIXED HYPERLIPIDEMIA: ICD-10-CM

## 2024-01-17 DIAGNOSIS — M43.16 SPONDYLOLISTHESIS, LUMBAR REGION: ICD-10-CM

## 2024-01-17 PROCEDURE — 3066F NEPHROPATHY DOC TX: CPT | Mod: CPTII,S$GLB,, | Performed by: STUDENT IN AN ORGANIZED HEALTH CARE EDUCATION/TRAINING PROGRAM

## 2024-01-17 PROCEDURE — 3061F NEG MICROALBUMINURIA REV: CPT | Mod: CPTII,S$GLB,, | Performed by: STUDENT IN AN ORGANIZED HEALTH CARE EDUCATION/TRAINING PROGRAM

## 2024-01-17 PROCEDURE — 72114 X-RAY EXAM L-S SPINE BENDING: CPT | Mod: TC,FY

## 2024-01-17 PROCEDURE — 3075F SYST BP GE 130 - 139MM HG: CPT | Mod: CPTII,S$GLB,, | Performed by: STUDENT IN AN ORGANIZED HEALTH CARE EDUCATION/TRAINING PROGRAM

## 2024-01-17 PROCEDURE — 3008F BODY MASS INDEX DOCD: CPT | Mod: CPTII,S$GLB,, | Performed by: STUDENT IN AN ORGANIZED HEALTH CARE EDUCATION/TRAINING PROGRAM

## 2024-01-17 PROCEDURE — 1159F MED LIST DOCD IN RCRD: CPT | Mod: CPTII,S$GLB,, | Performed by: STUDENT IN AN ORGANIZED HEALTH CARE EDUCATION/TRAINING PROGRAM

## 2024-01-17 PROCEDURE — 3078F DIAST BP <80 MM HG: CPT | Mod: CPTII,S$GLB,, | Performed by: STUDENT IN AN ORGANIZED HEALTH CARE EDUCATION/TRAINING PROGRAM

## 2024-01-17 PROCEDURE — 1160F RVW MEDS BY RX/DR IN RCRD: CPT | Mod: CPTII,S$GLB,, | Performed by: STUDENT IN AN ORGANIZED HEALTH CARE EDUCATION/TRAINING PROGRAM

## 2024-01-17 PROCEDURE — 99999 PR PBB SHADOW E&M-EST. PATIENT-LVL IV: CPT | Mod: PBBFAC,,, | Performed by: STUDENT IN AN ORGANIZED HEALTH CARE EDUCATION/TRAINING PROGRAM

## 2024-01-17 PROCEDURE — 3052F HG A1C>EQUAL 8.0%<EQUAL 9.0%: CPT | Mod: CPTII,S$GLB,, | Performed by: STUDENT IN AN ORGANIZED HEALTH CARE EDUCATION/TRAINING PROGRAM

## 2024-01-17 PROCEDURE — 72114 X-RAY EXAM L-S SPINE BENDING: CPT | Mod: 26,,, | Performed by: RADIOLOGY

## 2024-01-17 PROCEDURE — 99204 OFFICE O/P NEW MOD 45 MIN: CPT | Mod: S$GLB,,, | Performed by: STUDENT IN AN ORGANIZED HEALTH CARE EDUCATION/TRAINING PROGRAM

## 2024-01-17 PROCEDURE — 72052 X-RAY EXAM NECK SPINE 6/>VWS: CPT | Mod: 26,,, | Performed by: RADIOLOGY

## 2024-01-17 PROCEDURE — 72052 X-RAY EXAM NECK SPINE 6/>VWS: CPT | Mod: TC,FY

## 2024-01-17 RX ORDER — DEXTROSE 4 G
TABLET,CHEWABLE ORAL
Qty: 1 EACH | Refills: 0 | Status: SHIPPED | OUTPATIENT
Start: 2024-01-17

## 2024-01-17 RX ORDER — LANCETS 33 GAUGE
EACH MISCELLANEOUS
Qty: 100 EACH | Refills: 2 | Status: SHIPPED | OUTPATIENT
Start: 2024-01-17

## 2024-01-17 RX ORDER — GLIPIZIDE 5 MG/1
5 TABLET, FILM COATED, EXTENDED RELEASE ORAL 2 TIMES DAILY
Qty: 180 TABLET | Refills: 3 | Status: SHIPPED | OUTPATIENT
Start: 2024-01-17 | End: 2024-05-20

## 2024-01-17 RX ORDER — TIRZEPATIDE 5 MG/.5ML
5 INJECTION, SOLUTION SUBCUTANEOUS
Qty: 4 PEN | Refills: 2 | Status: SHIPPED | OUTPATIENT
Start: 2024-01-17 | End: 2024-03-08 | Stop reason: DRUGHIGH

## 2024-01-17 RX ORDER — ROSUVASTATIN CALCIUM 20 MG/1
20 TABLET, COATED ORAL DAILY
Qty: 90 TABLET | Refills: 3 | Status: SHIPPED | OUTPATIENT
Start: 2024-01-17 | End: 2025-01-16

## 2024-01-17 NOTE — PROGRESS NOTES
Chronic Pain - New Consult    Referring Physician: Kelsey Zhao NP    Chief Complaint:   Chief Complaint   Patient presents with    Low-back Pain    Neck Pain        SUBJECTIVE:    Monica Knight presents to the clinic for the evaluation of neck and back pain. The back pain started years ago and the neck pain started in April 2023 following no inciting event and symptoms have been worsening.The pain is located in the lower back area and radiates to the right > left leg (left thigh only).  The neck pain radiates down both arms with tingling in both hands.  The pain is described as dull, numbing, tight band, and tingling and is rated as 6/10. The pain is rated with a score of  3/10 on the BEST day and a score of 6/10 on the WORST day.  The lower back sometimes gets to a 6 or 7, but the pain has been easing up. Symptoms interfere with daily activity. The lower back pain is exacerbated by Sitting, Standing, and Lifting.  The pain is mitigated by ice and injection. The patient reports 4-6 hours of uninterrupted sleep per night.    Patient denies urinary incontinence and bowel incontinence.  She reports her balance has been getting worse in the past few months.  She states that she relocated recently.  She recently saw Dr. Neff in [facility] who did an epidural injection (Deceber 18th, 2023).  She states that the pain improved, but not for a long time.  She had a neck injection in July 2023.  She was with [facility] initially (Orthopedics and Pain management), however she had to switch to [facility] since her insurance changed.    Physical Therapy/Home Exercise: June 2023 (had PT for lower back).  She does some leg lifts that she learned from PT.  She also tries to walk at home.      Pain Disability Index Review:      1/17/2024     1:13 PM   Last 3 PDI Scores   Pain Disability Index (PDI) 30       Pain Medications:   - tramadol   - ibuprofen   - tylenol (combined with ibuprofen)   - hydrocodone     report:  Reviewed  and consistent with medication use as prescribed.  12/15/2023 11/16/2023 1 Hydrocodone-Acetamin 5-325 Mg 30.00 7 Sherron Hub     Pain Procedures:   Lumbar DARNELL December 2023  Cervical DARNELL July 2023    Imaging:   MRI Lumbar 12/11/23:  IMPRESSION:  Chronic L4-5 spondylolisthesis with new, 3mm anterior offset of L3 relative to L4.  L3-4 broad-based right foraminal 2.6mm disc herniation with superimposed annular bulging and posterior element hypertrophy.  There is moderate canal and severe right greater than left foraminal stenosis.  L4-5 diffuse disc bulge with posterior element hypertrophy.  There is severe canal and right greater than left foraminal stenosis. Facet hypertrophy and left facet effusion identified.  L5-S1 uncovering and bulging of the disc with caudal migration identified. Facet hypertrophy is evident, the left foramen is mildly narrowed.    Signature  Electronically signed: Jared Fernandez MD on 12-, 01:08PM      MRI Cervical 3/17/2023  IMPRESSION:  Multilevel, multifactorial cervical degenerative changes as detailed above.  The most notable levels are C5-6 and C6-7 where there are advanced spinal canal and foraminal stenosis. There is flattening of the spinal cord but no abnormal cord signal.    Signed: Harman Palomo MD    No past medical history on file.  History reviewed. No pertinent surgical history.  Social History     Socioeconomic History    Marital status: Single   Tobacco Use    Smoking status: Never    Smokeless tobacco: Never   Substance and Sexual Activity    Alcohol use: Not Currently    Drug use: Never    Sexual activity: Yes     No family history on file.    Review of patient's allergies indicates:   Allergen Reactions    Peanut Swelling     Throat swells and gets hives  Throat swells and gets hives  Throat swells and gets hives         Current Outpatient Medications   Medication Sig    cetirizine (ZYRTEC) 10 MG tablet Take 1 tablet (10 mg total) by mouth once daily.    glipiZIDE  "5 MG TR24 Take 1 tablet (5 mg total) by mouth 2 (two) times a day.    HYDROcodone-acetaminophen (NORCO) 5-325 mg per tablet Take 1 tablet by mouth every 6 (six) hours as needed for Pain.    ibuprofen (ADVIL,MOTRIN) 800 MG tablet Take 800 mg by mouth 2 (two) times daily.    lisinopriL-hydrochlorothiazide (PRINZIDE,ZESTORETIC) 20-12.5 mg per tablet lisinopril 20 mg-hydrochlorothiazide 12.5 mg tablet    rosuvastatin (CRESTOR) 20 MG tablet Take 1 tablet (20 mg total) by mouth once daily.    tirzepatide (MOUNJARO) 5 mg/0.5 mL PnIj Inject 5 mg into the skin every 7 days.    blood sugar diagnostic (TRUE METRIX GLUCOSE TEST STRIP) Strp Use as directed to check blood sugar once daily.    blood-glucose meter (TRUE METRIX AIR GLUCOSE METER) Misc Use as directed to check blood sugar once daily.    lancets (TRUEPLUS LANCETS) 33 gauge Misc Use as directed to check blood sugar once daily.     No current facility-administered medications for this visit.       REVIEW OF SYSTEMS:    GENERAL:  No weight loss, malaise or fevers.  HEENT:  Negative for frequent or significant headaches.  NECK:  Negative for lumps, goiter, pain and significant neck swelling.  RESPIRATORY:  Negative for cough, wheezing or shortness of breath.  CARDIOVASCULAR:  Negative for chest pain, leg swelling or palpitations.  GI:  Negative for abdominal discomfort, blood in stools or black stools or change in bowel habits.  MUSCULOSKELETAL:  See HPI.  SKIN:  Negative for lesions, rash, and itching.  PSYCH:  Negative for sleep disturbance, mood disorder and recent psychosocial stressors.  HEMATOLOGY/LYMPHOLOGY:  Negative for prolonged bleeding, bruising easily or swollen nodes.  NEURO:   No history of headaches, syncope, paralysis, seizures or tremors.  All other reviewed and negative other than HPI.    OBJECTIVE:    /61 (BP Location: Left arm, Patient Position: Sitting, BP Method: Large (Automatic))   Pulse 82   Resp 12   Ht 5' 3" (1.6 m)   Wt 87.1 kg (192 " lb)   BMI 34.01 kg/m²     PHYSICAL EXAMINATION:  General appearance: Well appearing, in no acute distress, alert and appropriately communicative.  Psych:  Mood and affect appropriate.  Skin: Skin color, texture, turgor normal, no rashes or lesions, in both upper and lower body.  Head/face:  Atraumatic, normocephalic.  Neck: pain to palpation over the cervical paraspinous muscles. Spurling Negative. No pain with neck flexion, extension, or lateral flexion. .  Cor: regular rate  Pulm: non-labored breathing  GI: Abdomen non-distended and non-tender.  Back: Straight leg raising in the sitting and supine positions is negative to radicular pain. No pain to palpation over the spine or paraspinal muscles. Normal range of motion without pain reproduction.  Extremities: Peripheral joint ROM is full and pain free without obvious instability or laxity in all four extremities. No deformities, edema, or skin discoloration. Good capillary refill.  Musculoskeletal:  Shoulder, hip, sacroiliac and knee provocative maneuvers are negative. Bilateral upper and lower extremity strength is normal and symmetric.  No atrophy or tone abnormalities are noted.  Neuro: Bilateral upper and lower extremity coordination and muscle stretch reflexes are physiologic and symmetric.  Negative Clonus. No loss of sensation is noted.  Gait: Normal.    ASSESSMENT: 63 y.o. year old female with neck and lower back pain, consistent with:     1. Spondylolisthesis, lumbar region  Ambulatory referral/consult to Pain Clinic    X-Ray Lumbar Complete Including Flex And Ext    Ambulatory referral/consult to Physical/Occupational Therapy    Stable. Cont present mgmt. Referral placed to Pain Med      2. Spinal stenosis of lumbar region without neurogenic claudication  Ambulatory referral/consult to Pain Clinic    X-Ray Lumbar Complete Including Flex And Ext    Ambulatory referral/consult to Physical/Occupational Therapy    As above      3. Cervical stenosis of spine   Ambulatory referral/consult to Pain Clinic    X-Ray Cervical Spine 5 View With Flex And Ext    Ambulatory referral/consult to Physical/Occupational Therapy    As above        IMPRESSION: Ms. Knight presents for chronic neck and lower back pain.  She has a history of injections in her back and her neck in the past with other providers.  She presents now to our clinic as her insurance changed.  History and physical exam are consistent with cervical and lumbar radiculopathy.  Imaging is consistent with cervical and lumbar degenerative disc disease with central and foraminal stenosis (C5/6 and C6/7 as well as L3-L5).  She recently had an epidural steroid injection for her lower back.  She has not been to physical therapy for her neck (and hasn't had physical therapy for the back in 6 months), so we will start with this for now.  If her pain persists, we will consider her for a repeat cervical epidural injection, which she found beneficial in the past.    PLAN:   - I have stressed the importance of physical activity and a home exercise plan to help with pain and improve health.  - Patient can continue with medications for now since they are providing benefits, using them appropriately, and without side effects.  - referral to physical therapy for neck and back pain  - xray cervical and lumbar with flexion/extension  - RTC in 2 - 3 months, or sooner if she is unable to tolerate physical therapy, to consider C7/T1 epidural injection.  - Counseled patient regarding the importance of activity modification and physical therapy.    The above plan and management options were discussed at length with patient. Patient is in agreement with the above and verbalized understanding. It will be communicated with the referring physician via electronic record, fax, or mail.    Roxann Randle  01/17/2024

## 2024-02-06 ENCOUNTER — TELEPHONE (OUTPATIENT)
Dept: SPINE | Facility: CLINIC | Age: 64
End: 2024-02-06
Payer: MEDICARE

## 2024-02-06 ENCOUNTER — PATIENT MESSAGE (OUTPATIENT)
Dept: PAIN MEDICINE | Facility: OTHER | Age: 64
End: 2024-02-06
Payer: MEDICARE

## 2024-02-06 DIAGNOSIS — M50.30 DDD (DEGENERATIVE DISC DISEASE), CERVICAL: Primary | ICD-10-CM

## 2024-02-06 NOTE — TELEPHONE ENCOUNTER
Staff spoke with staff regarding message sent over from call center, patient stated that she would like to have an injection scheduled. Patient also stated during her last visit with  he stated that she can have an injection scheduled after she finished physical therapy. Staff informed patient that we will speak with  regarding this.

## 2024-02-06 NOTE — TELEPHONE ENCOUNTER
----- Message from Pauly Navas sent at 2/6/2024 10:16 AM CST -----  Name of Who is calling :  BRIDGETT COOPER [[phone]]      What is the request in detail:    Pt would like to schedule an steroid injection. She stated she had one done before but not at [facility] . Please assist     Can the clinic reply by [facility]:no            What number to call back if not in [facility]:[phone]

## 2024-02-15 ENCOUNTER — CLINICAL SUPPORT (OUTPATIENT)
Dept: DIABETES | Facility: CLINIC | Age: 64
End: 2024-02-15
Payer: MEDICARE

## 2024-02-15 DIAGNOSIS — E11.9 TYPE 2 DIABETES MELLITUS WITHOUT COMPLICATION, WITHOUT LONG-TERM CURRENT USE OF INSULIN: ICD-10-CM

## 2024-02-15 DIAGNOSIS — E78.2 MIXED HYPERLIPIDEMIA: ICD-10-CM

## 2024-02-15 PROCEDURE — G0108 DIAB MANAGE TRN  PER INDIV: HCPCS | Mod: S$GLB,,,

## 2024-02-19 NOTE — PROGRESS NOTES
Diabetes Care Specialist Progress Note  Author: Daja Montana RD  Date: 2/19/2024    Program Intake  Reason for Diabetes Program Visit:: Initial Diabetes Assessment  Current diabetes risk level:: moderate  In the last 12 months, have you:: none  Continuous Glucose Monitoring  Patient has CGM: No    Lab Results   Component Value Date    HGBA1C 8.2 (H) 01/10/2024     Clinical      There is no height or weight on file to calculate BMI.    Problem Review  Reviewed health maintenance: yes    Clinical Assessment  Current Diabetes Treatment: Oral Medication, Injectable  Have you ever experienced hypoglycemia (low blood sugar)?: yes  Are you able to tell when your blood sugar is low?: Yes  What symptoms do you experience?: Nausea  How do you treat hypoglycemia (low blood sugar)?: 5-6 pieces of hard candy    Medication Information  Medication adherence impacting ability to self-manage diabetes?: No    Labs  Do you have regular lab work to monitor your medications?: Yes  Type of Regular Lab Work: A1c, Cholesterol, Microalbumin, CBC  Lab Compliance Barriers: No    Nutritional Status  Diet: Regular  Meal Plan 24 Hour Recall: Breakfast, Lunch, Dinner, Snack  Meal Plan 24 Hour Recall - Breakfast: Toast with coffee and [employer] yogurt  Meal Plan 24 Hour Recall - Lunch: 1/2 sandwich or kids meal or salad with chicken  Current nutritional status an area of need that is impacting patient's ability to self-manage diabetes?: No    Additional Social History    Support  Does anyone support you with your diabetes care?: yes (Pt is primary caregiver)  Who supports you?: self  Who takes you to your medical appointments?: self  Does the current support meet the patient's needs?: Yes  Is Support an area impacting ability to self-manage diabetes?: No    Cognitive/Behavioral Health  Alert and Oriented: Yes  Difficulty Thinking: No  Requires Prompting: No  Requires assistance for routine expression?: No  Cognitive or behavioral barriers  impacting ability to self-manage diabetes?: No    Communication  Language preference: English  Hearing Problems: No  Vision Problems: No  Communication needs impacting ability to self-manage diabetes?: No    Health Literacy  Preferred Learning Method: Face to Face, Reading Materials, Hands On  Health literacy needs impacting ability to self-manage diabetes?: No    Diabetes Self-Management Skills Assessment    Diabetes Disease Process/Treatment Options  Diabetes Disease Process/Treatment Options: Skills Assessment Completed: No  Deferred due to:: Time  Area of need?: Deferred    Nutrition/Healthy Eating  Challenges to healthy eating:: portion control  Method of carbohydrate measurement:: no method  Nutrition/Healthy Eating Skills Assessment Completed:: Yes  Assessment indicates:: Instruction Needed  Area of need?: Yes    Physical Activity/Exercise  Physical Activity/Exercise Skills Assessment Completed: : No  Deffered due to:: Time  Area of need?: Deferred    Medications  Patient is able to describe current diabetes management routine.: yes  Diabetes management routine:: injectable medications, oral medications  Patient is able to identify current diabetes medications, dosages, and appropriate timing of medications.: yes (Mounjaro, Glipizide before breakfas and with dinner)  Patient reports problems or concerns with current medication regimen.: no  Medication Skills Assessment Completed:: Yes  Assessment indicates:: Adequate understanding  Area of need?: No    Home Blood Glucose Monitoring  Patient states that blood sugar is checked at home daily.: yes  Monitoring Method:: home glucometer  How often do you check your blood sugar?: Twice a day  When do you check your blood sugar?: Before breakfast, Before dinner, 2 hours after meal  When you check what is your typical blood sugar range? : 142-145,150,100  Blood glucose logs:: no  Blood glucose logs reviewed today?: no  Home Blood Glucose Monitoring Skills Assessment  Completed: : Yes  Assessment indicates:: Adequate understanding  Area of need?: No    Acute Complications  Patient is able to identify types of acute complications: Yes  Patient Identified:: Hypoglycemia  Patient is able to state the basic meaning of hypoglycemia?: Yes  Patient can identify general symptoms of hypoglycemia: yes  Patient identified:: nausea  Able to state proper treatment of hypoglycemia?: yes  Patient identified:: 5-6 pieces of hard candy  Acute Complications Skills Assessment Completed: : Yes  Assessment indicates:: Instruction Needed  Area of need?: Yes    Chronic Complications  Patient is taking statin?: Yes  Chronic Complications Skills Assessment Completed: : No  Deferred due to:: Time  Area of need?: Deferred    Psychosocial/Coping  Psychosocial/Coping Skills Assessment Completed: : No  Deffered due to:: Time  Area of need?: Deferred    Assessment Summary and Plan    Based on today's diabetes care assessment, the following areas of need were identified:          2/15/2024    12:01 AM   Social   Support No   Cognitive/Behavioral Health No   Communication No   Health Literacy No            2/15/2024    12:01 AM   Clinical   Medication Adherence No   Lab Compliance No   Nutritional Status No            2/15/2024    12:01 AM   Diabetes Self-Management Skills   Diabetes Disease Process/Treatment Options Deferred   Nutrition/Healthy Eating Yes, see care plan.   Physical Activity/Exercise Deferred   Medication No   Home Blood Glucose Monitoring No   Acute Complications Yes   Chronic Complications Deferred   Psychosocial/Coping Deferred      Today's interventions were provided through individual discussion, instruction, and written materials were provided.      Patient verbalized understanding of instruction and written materials.  Pt was able to return back demonstration of instructions today. Patient understood key points, needs reinforcement and further instruction.     Diabetes Self-Management  Care Plan:    Today's Diabetes Self-Management Care Plan was developed with Monica's input. Monica has agreed to work toward the following goal(s) to improve his/her overall diabetes control.      Care Plan: Diabetes Management   Updates made since 1/20/2024 12:00 AM        Problem: Healthy Eating         Goal: Eat 2-3 meals daily with 30-45g/2-3 servings of Carbohydrate per meal. Limit snacking in between meal to 1 serving (15 grams).    Start Date: 2/15/2024   Expected End Date: 5/19/2024   This Visit's Progress: Deferred   Priority: High   Barriers: Knowledge deficit   Note:    Reviewed meal planning and general nutritional counseling with regards to diabetes management. Typical food intake obtained from patient. Patient currently is not measuring foods or carb counting.        Task: Reviewed the sources and role of Carbohydrate, Protein, and Fat and how each nutrient impacts blood sugar. Completed 2/15/2024        Task: Explained how to count carbohydrates using the food label and the use of dry measuring cups for accurate carb counting. Completed 2/15/2024        Task: Review the importance of balancing carbohydrates with each meal using portion control techniques to count servings of carbohydrate and label reading to identify serving size and amount of total carbs per serving. Completed 2/15/2024        Follow Up Plan     Follow up in about 4 months (around 6/15/2024) for 4-month F/U.    Today's care plan and follow up schedule was discussed with patient.  Monica verbalized understanding of the care plan, goals, and agrees to follow up plan.        The patient was encouraged to communicate with his/her health care provider/physician and care team regarding his/her condition(s) and treatment.  I provided the patient with my contact information today and encouraged to contact me via phone or [facility] Patient Portal as needed.     Length of Visit   Total Time: 30 Minutes

## 2024-02-21 ENCOUNTER — TELEPHONE (OUTPATIENT)
Dept: PRIMARY CARE CLINIC | Facility: CLINIC | Age: 64
End: 2024-02-21
Payer: MEDICARE

## 2024-02-21 NOTE — TELEPHONE ENCOUNTER
----- Message from Elsa Cortes sent at 2/21/2024  4:09 PM CST -----  Contact: Monica [phone]  Would like to receive medical advice.    Would they like a call back or a response via [facility]:  Call back    Additional information:  Monica is calling to see about getting an appt to become an EP with the provider. When trying to schedule with the provider for the pt no available appts would show up.  Please call Monica back for advice.

## 2024-02-22 ENCOUNTER — HOSPITAL ENCOUNTER (OUTPATIENT)
Facility: OTHER | Age: 64
Discharge: HOME | End: 2024-02-22
Attending: STUDENT IN AN ORGANIZED HEALTH CARE EDUCATION/TRAINING PROGRAM | Admitting: ANESTHESIOLOGY
Payer: MEDICARE

## 2024-02-22 ENCOUNTER — TELEPHONE (OUTPATIENT)
Dept: INTERNAL MEDICINE | Facility: CLINIC | Age: 64
End: 2024-02-22
Payer: MEDICARE

## 2024-02-22 VITALS
OXYGEN SATURATION: 97 % | BODY MASS INDEX: 32.78 KG/M2 | HEART RATE: 73 BPM | WEIGHT: 185 LBS | HEIGHT: 63 IN | RESPIRATION RATE: 16 BRPM | DIASTOLIC BLOOD PRESSURE: 61 MMHG | TEMPERATURE: 98 F | SYSTOLIC BLOOD PRESSURE: 118 MMHG

## 2024-02-22 DIAGNOSIS — M48.02 CERVICAL STENOSIS OF SPINE: ICD-10-CM

## 2024-02-22 DIAGNOSIS — G89.29 CHRONIC PAIN: ICD-10-CM

## 2024-02-22 DIAGNOSIS — M50.20 CERVICAL DISC HERNIATION: Primary | ICD-10-CM

## 2024-02-22 LAB — POCT GLUCOSE: 147 MG/DL (ref 70–110)

## 2024-02-22 PROCEDURE — 62321 NJX INTERLAMINAR CRV/THRC: CPT | Mod: ,,, | Performed by: STUDENT IN AN ORGANIZED HEALTH CARE EDUCATION/TRAINING PROGRAM

## 2024-02-22 PROCEDURE — 25000003 PHARM REV CODE 250: Performed by: STUDENT IN AN ORGANIZED HEALTH CARE EDUCATION/TRAINING PROGRAM

## 2024-02-22 PROCEDURE — 82947 ASSAY GLUCOSE BLOOD QUANT: CPT | Performed by: STUDENT IN AN ORGANIZED HEALTH CARE EDUCATION/TRAINING PROGRAM

## 2024-02-22 PROCEDURE — 63600175 PHARM REV CODE 636 W HCPCS: Performed by: STUDENT IN AN ORGANIZED HEALTH CARE EDUCATION/TRAINING PROGRAM

## 2024-02-22 PROCEDURE — 25500020 PHARM REV CODE 255: Performed by: STUDENT IN AN ORGANIZED HEALTH CARE EDUCATION/TRAINING PROGRAM

## 2024-02-22 PROCEDURE — 62321 NJX INTERLAMINAR CRV/THRC: CPT | Performed by: STUDENT IN AN ORGANIZED HEALTH CARE EDUCATION/TRAINING PROGRAM

## 2024-02-22 RX ORDER — FENTANYL CITRATE 50 UG/ML
INJECTION, SOLUTION INTRAMUSCULAR; INTRAVENOUS
Status: DISCONTINUED | OUTPATIENT
Start: 2024-02-22 | End: 2024-02-22 | Stop reason: HOSPADM

## 2024-02-22 RX ORDER — SODIUM CHLORIDE 9 MG/ML
INJECTION, SOLUTION INTRAVENOUS CONTINUOUS
Status: DISCONTINUED | OUTPATIENT
Start: 2024-02-22 | End: 2024-02-22 | Stop reason: HOSPADM

## 2024-02-22 RX ORDER — MIDAZOLAM HYDROCHLORIDE 1 MG/ML
INJECTION INTRAMUSCULAR; INTRAVENOUS
Status: DISCONTINUED | OUTPATIENT
Start: 2024-02-22 | End: 2024-02-22 | Stop reason: HOSPADM

## 2024-02-22 RX ORDER — LIDOCAINE HYDROCHLORIDE 20 MG/ML
INJECTION, SOLUTION INFILTRATION; PERINEURAL
Status: DISCONTINUED | OUTPATIENT
Start: 2024-02-22 | End: 2024-02-22 | Stop reason: HOSPADM

## 2024-02-22 RX ORDER — DEXAMETHASONE SODIUM PHOSPHATE 10 MG/ML
INJECTION INTRAMUSCULAR; INTRAVENOUS
Status: DISCONTINUED | OUTPATIENT
Start: 2024-02-22 | End: 2024-02-22 | Stop reason: HOSPADM

## 2024-02-22 NOTE — TELEPHONE ENCOUNTER
----- Message from Elsa Cortes sent at 2/21/2024  4:11 PM CST -----  Contact: Monica [phone]  Would like to receive medical advice.    Would they like a call back or a response via [facility]:  Call back    Additional information:  Monica is calling to see about getting an appt to become an EP with the provider. When trying to schedule with the provider for the pt no available appts would show up.  Please call Monica back for advice.

## 2024-02-22 NOTE — OP NOTE
Cervical Interlaminar Epidural Steroid Injection under Fluoroscopic Guidance    The procedure, risks, benefits, and options were discussed with the patient. There are no contraindications to the procedure. The patent expressed understanding and agreed to the procedure. Informed written consent was obtained prior to the start of the procedure and can be found in the patient's chart.     PATIENT NAME: Monica Knight   MRN: [id number]     DATE OF PROCEDURE: 02/22/2024    PROCEDURE: Cervical Interlaminar Epidural Steroid Injection C7/T1 under Fluoroscopic Guidance    PRE-OP DIAGNOSIS: DDD (degenerative disc disease), cervical [M50.30] Cervical radiculopathy [M54.12]    POST-OP DIAGNOSIS: Same    PHYSICIAN: Roxann Randle MD     ASSISTANTS: None    MEDICATIONS INJECTED: Preservative-free Decadron 10mg with 1cc of Lidocaine 1% MPF and preservative free normal saline    LOCAL ANESTHETIC INJECTED: Xylocaine 2%     SEDATION: Versed 1mg and Fentanyl 50mcg                                                                                                                                                                                     Conscious sedation ordered by M.D. Patient re-evaluation prior to administration of conscious sedation. No changes noted in patient's status from initial evaluation. The patient's vital signs were monitored by RN and patient remained hemodynamically stable throughout the procedure.    Event Time In   Sedation Start 0921   Sedation End         0926    ESTIMATED BLOOD LOSS: None    COMPLICATIONS: None    TECHNIQUE: Time-out was performed to identify the patient and procedure to be performed. With the patient laying in a prone position, the surgical area was prepped and draped in the usual sterile fashion using ChloraPrep and a fenestrated drape. The level was determined under fluoroscopy guidance. Skin anesthesia was achieved by injecting Lidocaine 2% over the injection site.  The interlaminar space  was then approached with a 20 gauge, 3.5 inch Tuohy needle that was introduced under fluoroscopic guidance with AP, lateral and/or contralateral oblique imaging. Once the Ligamentum flavum was encountered loss of resistance to saline was used to enter the epidural space. With positive loss of resistance and negative aspiration for CSF or Blood, contrast dye  Omnipaque (240mg/mL) was injected to confirm placement and there was no vascular runoff. Then 3 mL of the medication mixture listed above was then injected slowly. Displacement of the radio opaque contrast after injection of the medication confirmed that the medication went into the area of the epidural space. The needles were removed, and bleeding was nil. A sterile dressing was applied. No specimens collected. The patient tolerated the procedure well.     The patient was monitored after the procedure in the recovery area. They were given post-procedure and discharge instructions to follow at home. The patient was discharged in a stable condition.    Roxann Randle MD

## 2024-02-22 NOTE — DISCHARGE INSTRUCTIONS

## 2024-02-22 NOTE — H&P
HPI  Monica Kngiht presents for Procedure(s):  CERVICAL C7/T1 DARNELL    Recent anticoagulation/antiplatelets reviewed and verified with nursing.  Recent antibiotics/recent infections reviewed and verified with nursing.    No past medical history on file.  No past surgical history on file.  Review of patient's allergies indicates:   Allergen Reactions    Peanut Swelling     Throat swells and gets hives  Throat swells and gets hives  Throat swells and gets hives          PMHx, PSHx, Allergies, Medications reviewed in epic      ROS negative except pain complaints in HPI    OBJECTIVE:    There were no vitals taken for this visit.    PHYSICAL EXAMINATION:    GENERAL: Well appearing, in no acute distress, alert and oriented to name, situation, location.  PSYCH:  Mood and affect appropriate.  SKIN: Skin color, texture, turgor normal, no rashes or lesions at site of procedure.  CV: regular rate with palpation of the radial artery.  PULM: No evidence of respiratory difficulty, symmetric chest rise. No audible abnormal respiratory sounds.  NEURO: Cranial nerves grossly intact.    Plan:    Proceed with procedure as planned    Roxann Randle  02/22/2024

## 2024-02-22 NOTE — DISCHARGE SUMMARY
Discharge Note  Short Stay      SUMMARY     Admit Date: 2/22/2024    Attending Physician: Roxann Randle MD PhD    Discharge Physician: Roxann Randle      Discharge Date: 2/22/2024 9:18 AM    Procedure(s) (LRB):  CERVICAL C7/T1 DARNELL (N/A)    Final Diagnosis: DDD (degenerative disc disease), cervical [M50.30]    Disposition: Home or self care    Patient Instructions:   Current Discharge Medication List        CONTINUE these medications which have NOT CHANGED    Details   blood sugar diagnostic (TRUE METRIX GLUCOSE TEST STRIP) Strp Use as directed to check blood sugar once daily.  Qty: 100 strip, Refills: 2      blood-glucose meter (TRUE METRIX AIR GLUCOSE METER) Misc Use as directed to check blood sugar once daily.  Qty: 1 each, Refills: 0      cetirizine (ZYRTEC) 10 MG tablet Take 1 tablet (10 mg total) by mouth once daily.  Qty: 90 tablet, Refills: 1    Associated Diagnoses: Postnasal drip      glipiZIDE 5 MG TR24 Take 1 tablet (5 mg total) by mouth 2 (two) times a day.  Qty: 180 tablet, Refills: 3    Associated Diagnoses: Type 2 diabetes mellitus without complication, without long-term current use of insulin      HYDROcodone-acetaminophen (NORCO) 5-325 mg per tablet Take 1 tablet by mouth every 6 (six) hours as needed for Pain.      ibuprofen (ADVIL,MOTRIN) 800 MG tablet Take 800 mg by mouth 2 (two) times daily.      lancets (TRUEPLUS LANCETS) 33 gauge Misc Use as directed to check blood sugar once daily.  Qty: 100 each, Refills: 2      lisinopriL-hydrochlorothiazide (PRINZIDE,ZESTORETIC) 20-12.5 mg per tablet lisinopril 20 mg-hydrochlorothiazide 12.5 mg tablet  Qty: 90 tablet, Refills: 3    Comments: .      rosuvastatin (CRESTOR) 20 MG tablet Take 1 tablet (20 mg total) by mouth once daily.  Qty: 90 tablet, Refills: 3    Associated Diagnoses: Mixed hyperlipidemia      tirzepatide (MOUNJARO) 5 mg/0.5 mL PnIj Inject 5 mg into the skin every 7 days.  Qty: 4 Pen, Refills: 2    Associated Diagnoses: Type 2 diabetes  mellitus without complication, without long-term current use of insulin                 Discharge Diagnosis: DDD (degenerative disc disease), cervical [M50.30]  Condition on Discharge: Stable with no complications to procedure   Diet on Discharge: Same as before.  Activity: as per instruction sheet.  Discharge to: Home with a responsible adult.  Follow up: 2-4 weeks       Please call my office or pager at [phone] if experienced any weakness or loss of sensation, fever > 101.5, pain uncontrolled with oral medications, persistent nausea/vomiting/or diarrhea, redness or drainage from the incisions, or any other worrisome concerns. If physician on call was not reached or could not communicate with our office for any reason please go to the nearest emergency department      Roxann Randle MD PhD

## 2024-03-08 ENCOUNTER — HOSPITAL ENCOUNTER (OUTPATIENT)
Dept: RADIOLOGY | Facility: OTHER | Age: 64
Discharge: HOME | End: 2024-03-08
Payer: MEDICARE

## 2024-03-08 ENCOUNTER — OFFICE VISIT (OUTPATIENT)
Dept: PAIN MEDICINE | Facility: CLINIC | Age: 64
End: 2024-03-08
Payer: MEDICARE

## 2024-03-08 VITALS
BODY MASS INDEX: 33.2 KG/M2 | RESPIRATION RATE: 18 BRPM | OXYGEN SATURATION: 100 % | TEMPERATURE: 98 F | HEIGHT: 63 IN | HEART RATE: 87 BPM | SYSTOLIC BLOOD PRESSURE: 116 MMHG | DIASTOLIC BLOOD PRESSURE: 80 MMHG | WEIGHT: 187.38 LBS

## 2024-03-08 DIAGNOSIS — G89.4 CHRONIC PAIN SYNDROME: ICD-10-CM

## 2024-03-08 DIAGNOSIS — M70.61 GREATER TROCHANTERIC BURSITIS OF BOTH HIPS: Primary | ICD-10-CM

## 2024-03-08 DIAGNOSIS — M46.1 SACROILIITIS: ICD-10-CM

## 2024-03-08 DIAGNOSIS — E11.9 TYPE 2 DIABETES MELLITUS WITHOUT COMPLICATION, WITHOUT LONG-TERM CURRENT USE OF INSULIN: Primary | ICD-10-CM

## 2024-03-08 DIAGNOSIS — M50.20 CERVICAL DISC HERNIATION: ICD-10-CM

## 2024-03-08 DIAGNOSIS — M70.62 GREATER TROCHANTERIC BURSITIS OF BOTH HIPS: ICD-10-CM

## 2024-03-08 DIAGNOSIS — M48.02 CERVICAL STENOSIS OF SPINE: ICD-10-CM

## 2024-03-08 DIAGNOSIS — M70.62 GREATER TROCHANTERIC BURSITIS OF BOTH HIPS: Primary | ICD-10-CM

## 2024-03-08 DIAGNOSIS — M50.30 DDD (DEGENERATIVE DISC DISEASE), CERVICAL: ICD-10-CM

## 2024-03-08 DIAGNOSIS — M54.16 LUMBAR RADICULOPATHY: ICD-10-CM

## 2024-03-08 DIAGNOSIS — M70.61 GREATER TROCHANTERIC BURSITIS OF BOTH HIPS: ICD-10-CM

## 2024-03-08 PROCEDURE — 3079F DIAST BP 80-89 MM HG: CPT | Mod: CPTII,S$GLB,,

## 2024-03-08 PROCEDURE — 1160F RVW MEDS BY RX/DR IN RCRD: CPT | Mod: CPTII,S$GLB,,

## 2024-03-08 PROCEDURE — 99999 PR PBB SHADOW E&M-EST. PATIENT-LVL V: CPT | Mod: PBBFAC,,,

## 2024-03-08 PROCEDURE — 3052F HG A1C>EQUAL 8.0%<EQUAL 9.0%: CPT | Mod: CPTII,S$GLB,,

## 2024-03-08 PROCEDURE — 3074F SYST BP LT 130 MM HG: CPT | Mod: CPTII,S$GLB,,

## 2024-03-08 PROCEDURE — 1159F MED LIST DOCD IN RCRD: CPT | Mod: CPTII,S$GLB,,

## 2024-03-08 PROCEDURE — 3061F NEG MICROALBUMINURIA REV: CPT | Mod: CPTII,S$GLB,,

## 2024-03-08 PROCEDURE — 73521 X-RAY EXAM HIPS BI 2 VIEWS: CPT | Mod: TC,FY

## 2024-03-08 PROCEDURE — 73521 X-RAY EXAM HIPS BI 2 VIEWS: CPT | Mod: 26,,, | Performed by: INTERNAL MEDICINE

## 2024-03-08 PROCEDURE — 99214 OFFICE O/P EST MOD 30 MIN: CPT | Mod: S$GLB,,,

## 2024-03-08 PROCEDURE — 3066F NEPHROPATHY DOC TX: CPT | Mod: CPTII,S$GLB,,

## 2024-03-08 PROCEDURE — 3008F BODY MASS INDEX DOCD: CPT | Mod: CPTII,S$GLB,,

## 2024-03-08 NOTE — H&P (VIEW-ONLY)
Chronic Pain - New Consult    Referring Physician: No ref. provider found    Chief Complaint:   Chief Complaint   Patient presents with    Neck Pain        SUBJECTIVE:    Interval History 3/8/2024:  Monica Knight presents for follow-up from C7/T1 IL DARNELL on 2/22/2024.  She reports 85% relief that is on-going.  The pain in her neck today is 4/10.  Her main complaint today is of lower back pain with radiation into the right leg and lower leg to the foot and into the superior aspect of the left thigh.  She describes the pain as aching, and numbness at times.  The pain is worse with going from sitting to standing, sleeping on her sides, and getting in and out of the car.  Rest helps.  She reports not starting physical therapy as it is too expensive for her.  She would also like to discuss recent imaging results. She denies fever/night sweats, urinary incontinence, bowel incontinence, significant weight changes, significant motor weakness or changes, or loss of sensations.   Today's pain score in her lower back is 7/10.    Monica Knight presents to the clinic for the evaluation of neck and back pain. The back pain started years ago and the neck pain started in April 2023 following no inciting event and symptoms have been worsening.The pain is located in the lower back area and radiates to the right > left leg (left thigh only).  The neck pain radiates down both arms with tingling in both hands.  The pain is described as dull, numbing, tight band, and tingling and is rated as 6/10. The pain is rated with a score of  3/10 on the BEST day and a score of 6/10 on the WORST day.  The lower back sometimes gets to a 6 or 7, but the pain has been easing up. Symptoms interfere with daily activity. The lower back pain is exacerbated by Sitting, Standing, and Lifting.  The pain is mitigated by ice and injection. The patient reports 4-6 hours of uninterrupted sleep per night.    Patient denies urinary incontinence and bowel  incontinence.  She reports her balance has been getting worse in the past few months.  She states that she relocated recently.  She recently saw Dr. Neff in [facility] who did an epidural injection (Deceber 18th, 2023).  She states that the pain improved, but not for a long time.  She had a neck injection in July 2023.  She was with [facility] ([facility]opedics and Pain management), however she had to switch to [facility] since her insurance changed.    Physical Therapy/Home Exercise: June 2023 (had PT for lower back).  She does some leg lifts that she learned from PT.  She also tries to walk at home.          Pain Disability Index Review:      3/8/2024    10:55 AM 1/17/2024     1:13 PM   Last 3 PDI Scores   Pain Disability Index (PDI) 7 30       Pain Medications:   - tramadol   - ibuprofen   - tylenol (combined with ibuprofen)   - hydrocodone     report:  Reviewed and consistent with medication use as prescribed.  12/15/2023 11/16/2023 1 Hydrocodone-Acetamin 5-325 Mg 30.00 7 Sherron Hub     Pain Procedures:   Lumbar DARNELL December 2023  Cervical DARNELL July 2023 2/22/2024 - C7/T1 IL DARNELL - 85% relief    Imaging:     XR LUMBAR SPINE 5 VIEW WITH FLEX AND EXT  CLINICAL HISTORY:  Spondylolisthesis, lumbar region  TECHNIQUE:  AP, lateral, and oblique images are performed through the lumbar spine.  COMPARISON:  None  FINDINGS:  Lumbar vertebral body heights are maintained.    Disc space narrowing and endplate changes L5-S1.  Facet arthropathy lower lumbar spine.  Grade 1 anterolisthesis L3 on L4 and L4 on L5 with no significant change with flexion or extension.  Impression:  Degenerative change lower lumbar spine as above.     Electronically signed by: Isela Jackson  Date:                                            01/17/2024  Time:                                           14:39    XR CERVICAL SPINE 5 VIEW WITH FLEX AND EXT  CLINICAL HISTORY:  Spinal stenosis, cervical region  TECHNIQUE:  Five views of the cervical spine plus  flexion and extension views were performed.  COMPARISON:  None.  FINDINGS:  Vertebral body heights are maintained.  Disc space narrowing and endplate changes C5-6 and C6-7. oblique views show no significant bony narrowing of the neural foramina.  Straightening of the normal cervical lordosis.  Slight grade 1 retrolisthesis C5-6 with no significant change with flexion or extension  No significant prevertebral soft tissue edema.  Impression:  Degenerative change C5-6 and C6-7.    Electronically signed by: Isela Jackson  Date:                                            01/17/2024  Time:                                           14:38    MRI Lumbar 12/11/23:  IMPRESSION:  Chronic L4-5 spondylolisthesis with new, 3mm anterior offset of L3 relative to L4.  L3-4 broad-based right foraminal 2.6mm disc herniation with superimposed annular bulging and posterior element hypertrophy.  There is moderate canal and severe right greater than left foraminal stenosis.  L4-5 diffuse disc bulge with posterior element hypertrophy.  There is severe canal and right greater than left foraminal stenosis. Facet hypertrophy and left facet effusion identified.  L5-S1 uncovering and bulging of the disc with caudal migration identified. Facet hypertrophy is evident, the left foramen is mildly narrowed.    Signature  Electronically signed: Jared Fernandez MD on 12-, 01:08PM      MRI Cervical 3/17/2023  IMPRESSION:  Multilevel, multifactorial cervical degenerative changes as detailed above.  The most notable levels are C5-6 and C6-7 where there are advanced spinal canal and foraminal stenosis. There is flattening of the spinal cord but no abnormal cord signal.    Signed: Harman Palomo MD    Past Medical History:   Diagnosis Date    Asthma     Diabetes mellitus      Past Surgical History:   Procedure Laterality Date    EPIDURAL STEROID INJECTION N/A 2/22/2024    Procedure: CERVICAL C7/T1 DARNELL;  Surgeon: Roxann Randle MD;  Location:  [facility];  Service: Pain Management;  Laterality: N/A;  [phone]     Social History     Socioeconomic History    Marital status: Single   Tobacco Use    Smoking status: Never    Smokeless tobacco: Never   Substance and Sexual Activity    Alcohol use: Not Currently    Drug use: Never    Sexual activity: Yes     No family history on file.    Review of patient's allergies indicates:   Allergen Reactions    Peanut Swelling     Throat swells and gets hives  Throat swells and gets hives  Throat swells and gets hives         Current Outpatient Medications   Medication Sig    blood sugar diagnostic (TRUE METRIX GLUCOSE TEST STRIP) Strp Use as directed to check blood sugar once daily.    blood-glucose meter (TRUE METRIX AIR GLUCOSE METER) Misc Use as directed to check blood sugar once daily.    cetirizine (ZYRTEC) 10 MG tablet Take 1 tablet (10 mg total) by mouth once daily.    glipiZIDE 5 MG TR24 Take 1 tablet (5 mg total) by mouth 2 (two) times a day.    HYDROcodone-acetaminophen (NORCO) 5-325 mg per tablet Take 1 tablet by mouth every 6 (six) hours as needed for Pain.    ibuprofen (ADVIL,MOTRIN) 800 MG tablet Take 800 mg by mouth 2 (two) times daily.    lancets (TRUEPLUS LANCETS) 33 gauge Misc Use as directed to check blood sugar once daily.    rosuvastatin (CRESTOR) 20 MG tablet Take 1 tablet (20 mg total) by mouth once daily.    tirzepatide (MOUNJARO) 5 mg/0.5 mL PnIj Inject 5 mg into the skin every 7 days.    lisinopriL-hydrochlorothiazide (PRINZIDE,ZESTORETIC) 20-12.5 mg per tablet lisinopril 20 mg-hydrochlorothiazide 12.5 mg tablet     No current facility-administered medications for this visit.       REVIEW OF SYSTEMS:    GENERAL:  No weight loss, malaise or fevers.  HEENT:  Negative for frequent or significant headaches.  NECK:  Negative for lumps, goiter, pain and significant neck swelling.  RESPIRATORY:  Negative for cough, wheezing or shortness of breath.  CARDIOVASCULAR:  Negative for chest pain, leg  "swelling or palpitations.  GI:  Negative for abdominal discomfort, blood in stools or black stools or change in bowel habits.  MUSCULOSKELETAL:  See HPI.  SKIN:  Negative for lesions, rash, and itching.  PSYCH:  Negative for sleep disturbance, mood disorder and recent psychosocial stressors.  HEMATOLOGY/LYMPHOLOGY:  Negative for prolonged bleeding, bruising easily or swollen nodes.  NEURO:   No history of headaches, syncope, paralysis, seizures or tremors.  All other reviewed and negative other than HPI.    OBJECTIVE:    Vitals:    03/08/24 1055   BP: 116/80   Pulse: 87   Resp: 18   Temp: 98.2 °F (36.8 °C)   SpO2: 100%   Weight: 85 kg (187 lb 6.3 oz)   Height: 5' 3" (1.6 m)   PainSc:   4       PHYSICAL EXAMINATION:    GENERAL APPEARANCE: Well appearing, in no acute distress.  PSYCH:  Mood and affect appropriate.  SKIN: Skin color, texture, turgor normal, no rashes or lesions to visible areas.  HEAD/FACE:  Normocephalic, atraumatic.  NECK: No pain to palpation over the cervical paraspinous muscles. Spurling Negative. Mild pain with neck extension.   CARDIO: Rate regular.  No lower extremity edema. Capillary refill <2 seconds.   PULM: Bilateral chest rise. No apparent respiratory distress.   GI:  Non-distended  BACK: Straight leg raising in the sitting position is positive to radicular pain to the left leg in an L5/S1 distribution. Tenderness to palpation over the lumbar spine. Positive axial loading test bilaterally. Positive tenderness over bilateral SIJ left > right with positive thigh test, Positive FABERE,Ganselin and Yeoman's test bilaterally. Negative FADIR  EXTREMITIES: Peripheral joint ROM is full and pain free without obvious instability or laxity in all four extremities. No deformities, edema, or skin discoloration. Tenderness to palpation over bilateral GTB left > right.   MUSCULOSKELETAL: Hip provocative maneuvers are negative with exception of Bert's and log roll positive to left lateral thigh pain. " Bilateral upper and lower extremity strength is normal and symmetric.  No atrophy or tone abnormalities are noted.  NEURO: Bilateral upper and lower extremity coordination and muscle stretch reflexes are physiologic and symmetric.  Negative Bolanos's. Plantar response are downgoing. No clonus noted. Altered sensation to right lower lateral extremity.  GAIT: normal.      ASSESSMENT: 63 y.o. year old female with neck and lower back pain, consistent with:     1. Greater trochanteric bursitis of both hips  X-Ray Hips Bilateral 2 View Incl AP Pelvis    Procedure Order to Pain Management      2. Sacroiliitis  X-Ray Hips Bilateral 2 View Incl AP Pelvis    Procedure Order to Pain Management      3. DDD (degenerative disc disease), cervical        4. Chronic pain syndrome        5. Cervical stenosis of spine        6. Cervical disc herniation        7. Lumbar radiculopathy            IMPRESSION: Ms. Knight presents for chronic neck and lower back pain.  She has a history of injections in her back and her neck in the past with other providers.  She presents now to our clinic as her insurance changed.  History and physical exam are consistent with cervical and lumbar radiculopathy.  Imaging is consistent with cervical and lumbar degenerative disc disease with central and foraminal stenosis (C5/6 and C6/7 as well as L3-L5).  She recently had an epidural steroid injection for her lower back.  She has not been to physical therapy for her neck (and hasn't had physical therapy for the back in 6 months), so we will start with this for now.  If her pain persists, we will consider her for a repeat cervical epidural injection, which she found beneficial in the past.    PLAN:   - Reviewed recent imaging and discussed with patient.   - X-ray BL hips and pelvis ordered today given SIJ and GTB pain  - I have stressed the importance of physical activity and a home exercise plan to help with pain and improve health.  - Patient can continue  with medications for now since they are providing benefits, using them appropriately, and without side effects.  - Patient will follow-up with Physical Therapy to develop Home Exercise Program.  - Stretches and strengthening exercises provided to patient.  Encouraged daily routine for neck, back, core and hip strengthening and stabilization.  - Counseled patient regarding the importance of activity modification and physical therapy  - Procedure orders placed for  BL SIJ and BL GTB  - RTC 2 weeks after above procedure for follow-up.    The above plan and management options were discussed at length with patient. Patient is in agreement with the above and verbalized understanding. It will be communicated with the referring physician via electronic record, fax, or mail.    Ivette Milton  03/08/2024

## 2024-03-08 NOTE — PROGRESS NOTES
Chronic Pain - New Consult    Referring Physician: No ref. provider found    Chief Complaint:   Chief Complaint   Patient presents with    Neck Pain        SUBJECTIVE:    Interval History 3/8/2024:  Monica Knight presents for follow-up from C7/T1 IL DARNELL on 2/22/2024.  She reports 85% relief that is on-going.  The pain in her neck today is 4/10.  Her main complaint today is of lower back pain with radiation into the right leg and lower leg to the foot and into the superior aspect of the left thigh.  She describes the pain as aching, and numbness at times.  The pain is worse with going from sitting to standing, sleeping on her sides, and getting in and out of the car.  Rest helps.  She reports not starting physical therapy as it is too expensive for her.  She would also like to discuss recent imaging results. She denies fever/night sweats, urinary incontinence, bowel incontinence, significant weight changes, significant motor weakness or changes, or loss of sensations.   Today's pain score in her lower back is 7/10.    Monica Knight presents to the clinic for the evaluation of neck and back pain. The back pain started years ago and the neck pain started in April 2023 following no inciting event and symptoms have been worsening.The pain is located in the lower back area and radiates to the right > left leg (left thigh only).  The neck pain radiates down both arms with tingling in both hands.  The pain is described as dull, numbing, tight band, and tingling and is rated as 6/10. The pain is rated with a score of  3/10 on the BEST day and a score of 6/10 on the WORST day.  The lower back sometimes gets to a 6 or 7, but the pain has been easing up. Symptoms interfere with daily activity. The lower back pain is exacerbated by Sitting, Standing, and Lifting.  The pain is mitigated by ice and injection. The patient reports 4-6 hours of uninterrupted sleep per night.    Patient denies urinary incontinence and bowel  incontinence.  She reports her balance has been getting worse in the past few months.  She states that she relocated recently.  She recently saw Dr. Neff in [facility] who did an epidural injection (Deceber 18th, 2023).  She states that the pain improved, but not for a long time.  She had a neck injection in July 2023.  [place] [facility] [place] however she had to switch to [facility] since her insurance changed.    Physical Therapy/Home Exercise: June 2023 (had PT for lower back).  She does some leg lifts that she learned from PT.  She also tries to walk at home.          Pain Disability Index Review:      3/8/2024    10:55 AM 1/17/2024     1:13 PM   Last 3 PDI Scores   Pain Disability Index (PDI) 7 30       Pain Medications:   - tramadol   - ibuprofen   - tylenol (combined with ibuprofen)   - hydrocodone     report:  Reviewed and consistent with medication use as prescribed.  12/15/2023 11/16/2023 1 Hydrocodone-Acetamin 5-325 Mg 30.00 7 Sherron Hub     Pain Procedures:   Lumbar DARNELL December 2023  Cervical DARNELL July 2023 2/22/2024 - C7/T1 IL DARNELL - 85% relief    Imaging:     XR LUMBAR SPINE 5 VIEW WITH FLEX AND EXT  CLINICAL HISTORY:  Spondylolisthesis, lumbar region  TECHNIQUE:  AP, lateral, and oblique images are performed through the lumbar spine.  COMPARISON:  None  FINDINGS:  Lumbar vertebral body heights are maintained.    Disc space narrowing and endplate changes L5-S1.  Facet arthropathy lower lumbar spine.  Grade 1 anterolisthesis L3 on L4 and L4 on L5 with no significant change with flexion or extension.  Impression:  Degenerative change lower lumbar spine as above.     Electronically signed by: Isela Jackson  Date:                                            01/17/2024  Time:                                           14:39    XR CERVICAL SPINE 5 VIEW WITH FLEX AND EXT  CLINICAL HISTORY:  Spinal stenosis, cervical region  TECHNIQUE:  Five views of the cervical spine plus  flexion and extension views were performed.  COMPARISON:  None.  FINDINGS:  Vertebral body heights are maintained.  Disc space narrowing and endplate changes C5-6 and C6-7. oblique views show no significant bony narrowing of the neural foramina.  Straightening of the normal cervical lordosis.  Slight grade 1 retrolisthesis C5-6 with no significant change with flexion or extension  No significant prevertebral soft tissue edema.  Impression:  Degenerative change C5-6 and C6-7.    Electronically signed by: Isela Jackson  Date:                                            01/17/2024  Time:                                           14:38    MRI Lumbar 12/11/23:  IMPRESSION:  Chronic L4-5 spondylolisthesis with new, 3mm anterior offset of L3 relative to L4.  L3-4 broad-based right foraminal 2.6mm disc herniation with superimposed annular bulging and posterior element hypertrophy.  There is moderate canal and severe right greater than left foraminal stenosis.  L4-5 diffuse disc bulge with posterior element hypertrophy.  There is severe canal and right greater than left foraminal stenosis. Facet hypertrophy and left facet effusion identified.  L5-S1 uncovering and bulging of the disc with caudal migration identified. Facet hypertrophy is evident, the left foramen is mildly narrowed.    Signature  Electronically signed: Jared Fernandez MD on 12-, 01:08PM      MRI Cervical 3/17/2023  IMPRESSION:  Multilevel, multifactorial cervical degenerative changes as detailed above.  The most notable levels are C5-6 and C6-7 where there are advanced spinal canal and foraminal stenosis. There is flattening of the spinal cord but no abnormal cord signal.    Signed: Harman Palomo MD    Past Medical History:   Diagnosis Date    Asthma     Diabetes mellitus      Past Surgical History:   Procedure Laterality Date    EPIDURAL STEROID INJECTION N/A 2/22/2024    Procedure: CERVICAL C7/T1 DARNELL;  Surgeon: Roxann Randle MD;  Location:  [facility] [place] [facility];  Service: Pain Management;  Laterality: N/A;  [phone]     Social History     Socioeconomic History    Marital status: Single   Tobacco Use    Smoking status: Never    Smokeless tobacco: Never   Substance and Sexual Activity    Alcohol use: Not Currently    Drug use: Never    Sexual activity: Yes     No family history on file.    Review of patient's allergies indicates:   Allergen Reactions    Peanut Swelling     Throat swells and gets hives  Throat swells and gets hives  Throat swells and gets hives         Current Outpatient Medications   Medication Sig    blood sugar diagnostic (TRUE METRIX GLUCOSE TEST STRIP) Strp Use as directed to check blood sugar once daily.    blood-glucose meter (TRUE METRIX AIR GLUCOSE METER) Misc Use as directed to check blood sugar once daily.    cetirizine (ZYRTEC) 10 MG tablet Take 1 tablet (10 mg total) by mouth once daily.    glipiZIDE 5 MG TR24 Take 1 tablet (5 mg total) by mouth 2 (two) times a day.    HYDROcodone-acetaminophen (NORCO) 5-325 mg per tablet Take 1 tablet by mouth every 6 (six) hours as needed for Pain.    ibuprofen (ADVIL,MOTRIN) 800 MG tablet Take 800 mg by mouth 2 (two) times daily.    lancets (TRUEPLUS LANCETS) 33 gauge Misc Use as directed to check blood sugar once daily.    rosuvastatin (CRESTOR) 20 MG tablet Take 1 tablet (20 mg total) by mouth once daily.    tirzepatide (MOUNJARO) 5 mg/0.5 mL PnIj Inject 5 mg into the skin every 7 days.    lisinopriL-hydrochlorothiazide (PRINZIDE,ZESTORETIC) 20-12.5 mg per tablet lisinopril 20 mg-hydrochlorothiazide 12.5 mg tablet     No current facility-administered medications for this visit.       REVIEW OF SYSTEMS:    GENERAL:  No weight loss, malaise or fevers.  HEENT:  Negative for frequent or significant headaches.  NECK:  Negative for lumps, goiter, pain and significant neck swelling.  RESPIRATORY:  Negative for cough, wheezing or shortness of breath.  CARDIOVASCULAR:  Negative for chest pain, leg  "swelling or palpitations.  GI:  Negative for abdominal discomfort, blood in stools or black stools or change in bowel habits.  MUSCULOSKELETAL:  See HPI.  SKIN:  Negative for lesions, rash, and itching.  PSYCH:  Negative for sleep disturbance, mood disorder and recent psychosocial stressors.  HEMATOLOGY/LYMPHOLOGY:  Negative for prolonged bleeding, bruising easily or swollen nodes.  NEURO:   No history of headaches, syncope, paralysis, seizures or tremors.  All other reviewed and negative other than HPI.    OBJECTIVE:    Vitals:    03/08/24 1055   BP: 116/80   Pulse: 87   Resp: 18   Temp: 98.2 °F (36.8 °C)   SpO2: 100%   Weight: 85 kg (187 lb 6.3 oz)   Height: 5' 3" (1.6 m)   PainSc:   4       PHYSICAL EXAMINATION:    GENERAL APPEARANCE: Well appearing, in no acute distress.  PSYCH:  Mood and affect appropriate.  SKIN: Skin color, texture, turgor normal, no rashes or lesions to visible areas.  HEAD/FACE:  Normocephalic, atraumatic.  NECK: No pain to palpation over the cervical paraspinous muscles. Spurling Negative. Mild pain with neck extension.   CARDIO: Rate regular.  No lower extremity edema. Capillary refill <2 seconds.   PULM: Bilateral chest rise. No apparent respiratory distress.   GI:  Non-distended  BACK: Straight leg raising in the sitting position is positive to radicular pain to the left leg in an L5/S1 distribution. Tenderness to palpation over the lumbar spine. Positive axial loading test bilaterally. Positive tenderness over bilateral SIJ left > right with positive thigh test, Positive FABERE,Ganselin and Yeoman's test bilaterally. Negative FADIR  EXTREMITIES: Peripheral joint ROM is full and pain free without obvious instability or laxity in all four extremities. No deformities, edema, or skin discoloration. Tenderness to palpation over bilateral GTB left > right.   MUSCULOSKELETAL: Hip provocative maneuvers are negative with exception of Bert's and log roll positive to left lateral thigh pain. " Bilateral upper and lower extremity strength is normal and symmetric.  No atrophy or tone abnormalities are noted.  NEURO: Bilateral upper and lower extremity coordination and muscle stretch reflexes are physiologic and symmetric.  Negative Bolanos's. Plantar response are downgoing. No clonus noted. Altered sensation to right lower lateral extremity.  GAIT: normal.      ASSESSMENT: 63 y.o. year old female with neck and lower back pain, consistent with:     1. Greater trochanteric bursitis of both hips  X-Ray Hips Bilateral 2 View Incl AP Pelvis    Procedure Order to Pain Management      2. Sacroiliitis  X-Ray Hips Bilateral 2 View Incl AP Pelvis    Procedure Order to Pain Management      3. DDD (degenerative disc disease), cervical        4. Chronic pain syndrome        5. Cervical stenosis of spine        6. Cervical disc herniation        7. Lumbar radiculopathy            IMPRESSION: Ms. Knight presents for chronic neck and lower back pain.  She has a history of injections in her back and her neck in the past with other providers.  She presents now to our clinic as her insurance changed.  History and physical exam are consistent with cervical and lumbar radiculopathy.  Imaging is consistent with cervical and lumbar degenerative disc disease with central and foraminal stenosis (C5/6 and C6/7 as well as L3-L5).  She recently had an epidural steroid injection for her lower back.  She has not been to physical therapy for her neck (and hasn't had physical therapy for the back in 6 months), so we will start with this for now.  If her pain persists, we will consider her for a repeat cervical epidural injection, which she found beneficial in the past.    PLAN:   - Reviewed recent imaging and discussed with patient.   - X-ray BL hips and pelvis ordered today given SIJ and GTB pain  - I have stressed the importance of physical activity and a home exercise plan to help with pain and improve health.  - Patient can continue  with medications for now since they are providing benefits, using them appropriately, and without side effects.  - Patient will follow-up with Physical Therapy to develop Home Exercise Program.  - Stretches and strengthening exercises provided to patient.  Encouraged daily routine for neck, back, core and hip strengthening and stabilization.  - Counseled patient regarding the importance of activity modification and physical therapy  - Procedure orders placed for  BL SIJ and BL GTB  - RTC 2 weeks after above procedure for follow-up.    The above plan and management options were discussed at length with patient. Patient is in agreement with the above and verbalized understanding. It will be communicated with the referring physician via electronic record, fax, or mail.    Ivette Milton  03/08/2024

## 2024-03-11 ENCOUNTER — PATIENT MESSAGE (OUTPATIENT)
Dept: PAIN MEDICINE | Facility: OTHER | Age: 64
End: 2024-03-11
Payer: MEDICARE

## 2024-03-15 ENCOUNTER — PATIENT MESSAGE (OUTPATIENT)
Dept: PAIN MEDICINE | Facility: OTHER | Age: 64
End: 2024-03-15
Payer: MEDICARE

## 2024-03-19 ENCOUNTER — PATIENT OUTREACH (OUTPATIENT)
Dept: ADMINISTRATIVE | Facility: HOSPITAL | Age: 64
End: 2024-03-19
Payer: MEDICARE

## 2024-03-19 NOTE — PROGRESS NOTES
Health Maintenance Due   Topic Date Due    Hepatitis C Screening  Never done    Cervical Cancer Screening  Never done    Foot Exam  Never done    Eye Exam  Never done    HIV Screening  Never done    TETANUS VACCINE  Never done    Mammogram  Never done    Colorectal Cancer Screening  Never done    Shingles Vaccine (1 of 2) Never done    RSV Vaccine (Age 60+ and Pregnant patients) (1 - 1-dose 60+ series) Never done    Hemoglobin A1c  04/10/2024      Chart reviewed. Triggered LINKS. Updated Care Everywhere. Patient is scheduled for est care appointment on 0/4/02/24.     Avel Velazco CMA  Population Health Care Coordinator  Primary Care Team

## 2024-03-26 ENCOUNTER — PATIENT MESSAGE (OUTPATIENT)
Dept: PAIN MEDICINE | Facility: OTHER | Age: 64
End: 2024-03-26
Payer: MEDICARE

## 2024-03-28 ENCOUNTER — HOSPITAL ENCOUNTER (OUTPATIENT)
Facility: OTHER | Age: 64
Discharge: HOME | End: 2024-03-28
Attending: STUDENT IN AN ORGANIZED HEALTH CARE EDUCATION/TRAINING PROGRAM | Admitting: STUDENT IN AN ORGANIZED HEALTH CARE EDUCATION/TRAINING PROGRAM
Payer: MEDICARE

## 2024-03-28 VITALS
WEIGHT: 181 LBS | BODY MASS INDEX: 32.07 KG/M2 | HEART RATE: 91 BPM | HEIGHT: 63 IN | RESPIRATION RATE: 16 BRPM | TEMPERATURE: 98 F | OXYGEN SATURATION: 93 % | SYSTOLIC BLOOD PRESSURE: 110 MMHG | DIASTOLIC BLOOD PRESSURE: 58 MMHG

## 2024-03-28 DIAGNOSIS — M46.1 SACROILIITIS: Primary | ICD-10-CM

## 2024-03-28 DIAGNOSIS — G89.29 CHRONIC PAIN: ICD-10-CM

## 2024-03-28 LAB — POCT GLUCOSE: 168 MG/DL (ref 70–110)

## 2024-03-28 PROCEDURE — 27096 INJECT SACROILIAC JOINT: CPT | Mod: 50 | Performed by: STUDENT IN AN ORGANIZED HEALTH CARE EDUCATION/TRAINING PROGRAM

## 2024-03-28 PROCEDURE — 99152 MOD SED SAME PHYS/QHP 5/>YRS: CPT | Performed by: STUDENT IN AN ORGANIZED HEALTH CARE EDUCATION/TRAINING PROGRAM

## 2024-03-28 PROCEDURE — 25000003 PHARM REV CODE 250: Performed by: STUDENT IN AN ORGANIZED HEALTH CARE EDUCATION/TRAINING PROGRAM

## 2024-03-28 PROCEDURE — 20610 DRAIN/INJ JOINT/BURSA W/O US: CPT | Mod: 50,59,, | Performed by: STUDENT IN AN ORGANIZED HEALTH CARE EDUCATION/TRAINING PROGRAM

## 2024-03-28 PROCEDURE — 25500020 PHARM REV CODE 255: Performed by: STUDENT IN AN ORGANIZED HEALTH CARE EDUCATION/TRAINING PROGRAM

## 2024-03-28 PROCEDURE — 27096 INJECT SACROILIAC JOINT: CPT | Mod: 50,,, | Performed by: STUDENT IN AN ORGANIZED HEALTH CARE EDUCATION/TRAINING PROGRAM

## 2024-03-28 PROCEDURE — 63600175 PHARM REV CODE 636 W HCPCS: Performed by: STUDENT IN AN ORGANIZED HEALTH CARE EDUCATION/TRAINING PROGRAM

## 2024-03-28 PROCEDURE — 99152 MOD SED SAME PHYS/QHP 5/>YRS: CPT | Mod: ,,, | Performed by: STUDENT IN AN ORGANIZED HEALTH CARE EDUCATION/TRAINING PROGRAM

## 2024-03-28 PROCEDURE — 20610 DRAIN/INJ JOINT/BURSA W/O US: CPT | Mod: 50,59 | Performed by: STUDENT IN AN ORGANIZED HEALTH CARE EDUCATION/TRAINING PROGRAM

## 2024-03-28 PROCEDURE — 82947 ASSAY GLUCOSE BLOOD QUANT: CPT | Performed by: STUDENT IN AN ORGANIZED HEALTH CARE EDUCATION/TRAINING PROGRAM

## 2024-03-28 RX ORDER — MIDAZOLAM HYDROCHLORIDE 1 MG/ML
INJECTION INTRAMUSCULAR; INTRAVENOUS
Status: DISCONTINUED | OUTPATIENT
Start: 2024-03-28 | End: 2024-03-28 | Stop reason: HOSPADM

## 2024-03-28 RX ORDER — FENTANYL CITRATE 50 UG/ML
INJECTION, SOLUTION INTRAMUSCULAR; INTRAVENOUS
Status: DISCONTINUED | OUTPATIENT
Start: 2024-03-28 | End: 2024-03-28 | Stop reason: HOSPADM

## 2024-03-28 RX ORDER — SODIUM CHLORIDE 9 MG/ML
INJECTION, SOLUTION INTRAVENOUS CONTINUOUS
Status: DISCONTINUED | OUTPATIENT
Start: 2024-03-28 | End: 2024-03-28 | Stop reason: HOSPADM

## 2024-03-28 RX ORDER — BUPIVACAINE HYDROCHLORIDE 2.5 MG/ML
INJECTION, SOLUTION EPIDURAL; INFILTRATION; INTRACAUDAL
Status: DISCONTINUED | OUTPATIENT
Start: 2024-03-28 | End: 2024-03-28 | Stop reason: HOSPADM

## 2024-03-28 RX ORDER — TRIAMCINOLONE ACETONIDE 40 MG/ML
INJECTION, SUSPENSION INTRA-ARTICULAR; INTRAMUSCULAR
Status: DISCONTINUED | OUTPATIENT
Start: 2024-03-28 | End: 2024-03-28 | Stop reason: HOSPADM

## 2024-03-28 RX ORDER — LIDOCAINE HYDROCHLORIDE 20 MG/ML
INJECTION, SOLUTION INFILTRATION; PERINEURAL
Status: DISCONTINUED | OUTPATIENT
Start: 2024-03-28 | End: 2024-03-28 | Stop reason: HOSPADM

## 2024-03-28 NOTE — OP NOTE
Sacroiliac Joint and Greater Trochanteric Bursa Injection under Fluoroscopic Guidance    The procedure, risks, benefits, and options were discussed with the patient. There are no contraindications to the procedure. The patent expressed understanding and agreed to the procedure. Informed written consent was obtained prior to the start of the procedure and can be found in the patient's chart.    PATIENT NAME: Monica Knight   MRN: [id number]     DATE OF PROCEDURE: 03/28/2024    PROCEDURE:   Bilateral Sacroiliac Joint Injection under Fluoroscopic Guidance  Bilateral Greater Trochanteric Bursa Injection under Fluoroscopic Guidance    PRE-OP DIAGNOSIS: Greater trochanteric bursitis of both hips [M70.61, M70.62]  Sacroiliitis [M46.1]    POST-OP DIAGNOSIS: Same    PHYSICIAN: Roxann Randle MD    ASSISTANTS: None    MEDICATIONS INJECTED: Preservative-free Kenalog 40mg with 7cc of Bupivacine 0.25%     LOCAL ANESTHETIC INJECTED: Xylocaine 2%     SEDATION: Versed 2mg and Fentanyl 50mcg                                                                                                                                                                                     Conscious sedation ordered by MDOMONIQUE. Patient re-evaluation prior to administration of conscious sedation. No changes noted in patient's status from initial evaluation. The patient's vital signs were monitored by RN and patient remained hemodynamically stable throughout the procedure.    Event Time In   Sedation Start 1141   Sedation End 1152       ESTIMATED BLOOD LOSS: None    COMPLICATIONS: None    TECHNIQUE: Time-out was performed to identify the patient and procedure to be performed. With the patient laying in a prone position, the surgical area was prepped and draped in the usual sterile fashion using ChloraPrep and a fenestrated drape. The sacroiliac joint was determined under fluoroscopy guidance. Skin anesthesia was achieved by injecting Lidocaine 2% over the  injection site. The sacroiliac joint was then approached with a 22 gauge,  3.5 inch spinal quinke needle that was introduced under fluoroscopic guidance in the AP and Lateral views. Once the needle tip was in the area of the joint, and there was no blood aspiration, contrast dye contrast dye Omnipaque (240mg/mL) was injected to confirm placement and there was no vascular runoff. Fluoroscopic imaging in the AP and lateral views revealed a clear outline of the joint space. 2 mL of the medication mixture listed above was injected slowly intraarticular and josh-articular. Displacement of the radio opaque contrast after injection of the medication confirmed that the medication went into the area of the joint. The needles were removed and bleeding was nil. A sterile dressing was applied. No specimens collected. The patient tolerated the procedure well.     TECHNIQUE: Time-out was performed to identify the patient and procedure to be performed. With the patient laying in a prone position, the surgical area was prepped and draped in the usual sterile fashion using ChloraPrep and a fenestrated drape. The area overlying the greater trochanteric bursa was determined under fluoroscopy guidance. Skin anesthesia was achieved by injecting Lidocaine 2% over the injection site. The greater trochanteric bursa was then approached with a 22 gauge, 5 inch spinal quinke needle that was introduced under fluoroscopic guidance in the AP view. Once the needle tip was in the area of the bursa, and there was no blood aspiration,  Contrast dye  Omnipaque (240mg/mL) was injected to confirm placement and there was no vascular runoff. 2 mL of the medication mixture listed above was injected slowly. The needles were removed and bleeding was nil. A sterile dressing was applied. No specimens collected. The patient tolerated the procedure well.  The patient was monitored after the procedure in the recovery area. They were given post-procedure and  discharge instructions to follow at home. The patient was discharged in a stable condition.    Roxann Randle MD

## 2024-03-28 NOTE — INTERVAL H&P NOTE
The patient was examined and no significant changes were noted from the updated H&P or last clinic note.    The risks and benefits of this procedure, including alternative therapies, were discussed with the patient.  The discussion of risks included infection, bleeding, need for additional procedures or surgery, nerve damage, paralysis, adverse medication reaction(s), stroke, and if appropriate for the procedure, death.  Questions regarding the procedure, risks, expected outcome, and possible side effects were solicited and answered to Priyanka's satisfaction.  Priyanka Knight wishes to proceed with the injection or procedure as confirmed by written consent.

## 2024-03-28 NOTE — DISCHARGE SUMMARY
Discharge Note  Short Stay      SUMMARY     Admit Date: 3/28/2024    Attending Physician: Roxann Randle MD PhD    Discharge Physician: Roxann Randle      Discharge Date: 3/28/2024 11:39 AM    Procedure(s) (LRB):  INJECTION, JOINT BILATERAL SI AND BILATERAL GTB (Bilateral)    Final Diagnosis: Greater trochanteric bursitis of both hips [M70.61, M70.62]  Sacroiliitis [M46.1]    Disposition: Home or self care    Patient Instructions:   Current Discharge Medication List        CONTINUE these medications which have NOT CHANGED    Details   blood sugar diagnostic (TRUE METRIX GLUCOSE TEST STRIP) Strp Use as directed to check blood sugar once daily.  Qty: 100 strip, Refills: 2      blood-glucose meter (TRUE METRIX AIR GLUCOSE METER) Misc Use as directed to check blood sugar once daily.  Qty: 1 each, Refills: 0      cetirizine (ZYRTEC) 10 MG tablet Take 1 tablet (10 mg total) by mouth once daily.  Qty: 90 tablet, Refills: 1    Associated Diagnoses: Postnasal drip      glipiZIDE 5 MG TR24 Take 1 tablet (5 mg total) by mouth 2 (two) times a day.  Qty: 180 tablet, Refills: 3    Associated Diagnoses: Type 2 diabetes mellitus without complication, without long-term current use of insulin      HYDROcodone-acetaminophen (NORCO) 5-325 mg per tablet Take 1 tablet by mouth every 6 (six) hours as needed for Pain.      ibuprofen (ADVIL,MOTRIN) 800 MG tablet Take 800 mg by mouth 2 (two) times daily.      lancets (TRUEPLUS LANCETS) 33 gauge Misc Use as directed to check blood sugar once daily.  Qty: 100 each, Refills: 2      lisinopriL-hydrochlorothiazide (PRINZIDE,ZESTORETIC) 20-12.5 mg per tablet lisinopril 20 mg-hydrochlorothiazide 12.5 mg tablet  Qty: 90 tablet, Refills: 3    Comments: .      rosuvastatin (CRESTOR) 20 MG tablet Take 1 tablet (20 mg total) by mouth once daily.  Qty: 90 tablet, Refills: 3    Associated Diagnoses: Mixed hyperlipidemia      tirzepatide 7.5 mg/0.5 mL PnIj Inject 7.5 mg into the skin every 7 days.  Qty: 4 Pen,  Refills: 2    Associated Diagnoses: Type 2 diabetes mellitus without complication, without long-term current use of insulin                 Discharge Diagnosis: Greater trochanteric bursitis of both hips [M70.61, M70.62]  Sacroiliitis [M46.1]  Condition on Discharge: Stable with no complications to procedure   Diet on Discharge: Same as before.  Activity: as per instruction sheet.  Discharge to: Home with a responsible adult.  Follow up: 2-4 weeks       Please call my office or pager at [phone] if experienced any weakness or loss of sensation, fever > 101.5, pain uncontrolled with oral medications, persistent nausea/vomiting/or diarrhea, redness or drainage from the incisions, or any other worrisome concerns. If physician on call was not reached or could not communicate with our office for any reason please go to the nearest emergency department      Roxann Randle MD PhD

## 2024-03-28 NOTE — DISCHARGE INSTRUCTIONS

## 2024-04-02 ENCOUNTER — OFFICE VISIT (OUTPATIENT)
Dept: INTERNAL MEDICINE | Facility: CLINIC | Age: 64
End: 2024-04-02
Payer: MEDICARE

## 2024-04-02 DIAGNOSIS — E11.9 DIABETES MELLITUS WITHOUT COMPLICATION: ICD-10-CM

## 2024-04-02 DIAGNOSIS — F41.9 ANXIETY: ICD-10-CM

## 2024-04-02 DIAGNOSIS — E11.65 UNCONTROLLED TYPE 2 DIABETES MELLITUS WITH HYPERGLYCEMIA: ICD-10-CM

## 2024-04-02 DIAGNOSIS — I10 HYPERTENSION, UNSPECIFIED TYPE: ICD-10-CM

## 2024-04-02 DIAGNOSIS — R49.0 HOARSENESS: ICD-10-CM

## 2024-04-02 DIAGNOSIS — Z00.00 PREVENTATIVE HEALTH CARE: ICD-10-CM

## 2024-04-02 DIAGNOSIS — Z12.31 SCREENING MAMMOGRAM, ENCOUNTER FOR: Primary | ICD-10-CM

## 2024-04-02 PROCEDURE — 3078F DIAST BP <80 MM HG: CPT | Mod: CPTII,S$GLB,, | Performed by: INTERNAL MEDICINE

## 2024-04-02 PROCEDURE — 99999 PR PBB SHADOW E&M-EST. PATIENT-LVL V: CPT | Mod: PBBFAC,,, | Performed by: INTERNAL MEDICINE

## 2024-04-02 PROCEDURE — 99396 PREV VISIT EST AGE 40-64: CPT | Mod: S$GLB,,, | Performed by: INTERNAL MEDICINE

## 2024-04-02 PROCEDURE — 3066F NEPHROPATHY DOC TX: CPT | Mod: CPTII,S$GLB,, | Performed by: INTERNAL MEDICINE

## 2024-04-02 PROCEDURE — 3008F BODY MASS INDEX DOCD: CPT | Mod: CPTII,S$GLB,, | Performed by: INTERNAL MEDICINE

## 2024-04-02 PROCEDURE — 1159F MED LIST DOCD IN RCRD: CPT | Mod: CPTII,S$GLB,, | Performed by: INTERNAL MEDICINE

## 2024-04-02 PROCEDURE — 3075F SYST BP GE 130 - 139MM HG: CPT | Mod: CPTII,S$GLB,, | Performed by: INTERNAL MEDICINE

## 2024-04-02 PROCEDURE — 3061F NEG MICROALBUMINURIA REV: CPT | Mod: CPTII,S$GLB,, | Performed by: INTERNAL MEDICINE

## 2024-04-02 PROCEDURE — 3052F HG A1C>EQUAL 8.0%<EQUAL 9.0%: CPT | Mod: CPTII,S$GLB,, | Performed by: INTERNAL MEDICINE

## 2024-04-02 RX ORDER — METFORMIN HYDROCHLORIDE 1000 MG/1
1000 TABLET ORAL 2 TIMES DAILY WITH MEALS
Qty: 180 TABLET | Refills: 1 | Status: SHIPPED | OUTPATIENT
Start: 2024-04-02 | End: 2025-04-02

## 2024-04-06 VITALS
WEIGHT: 184.94 LBS | HEART RATE: 84 BPM | DIASTOLIC BLOOD PRESSURE: 74 MMHG | SYSTOLIC BLOOD PRESSURE: 138 MMHG | HEIGHT: 63 IN | TEMPERATURE: 99 F | OXYGEN SATURATION: 95 % | BODY MASS INDEX: 32.77 KG/M2

## 2024-04-06 PROBLEM — E11.65 UNCONTROLLED TYPE 2 DIABETES MELLITUS WITH HYPERGLYCEMIA: Status: ACTIVE | Noted: 2024-04-06

## 2024-04-06 NOTE — PROGRESS NOTES
Subjective:       Patient ID: Monica Knight is a 63 y.o. female.    Chief Complaint: Annual Exam    HPI  She is here for annual exam     Past medical history: Hypertension, diabetes, cervical and lumbar spinal stenosis, status post      Medications:  See medication list     No known drug allergies       Review of Systems   Constitutional:  Negative for chills, fatigue, fever and unexpected weight change.   Respiratory:  Negative for chest tightness and shortness of breath.    Cardiovascular:  Negative for chest pain and palpitations.   Gastrointestinal:  Negative for abdominal pain and blood in stool.   Neurological:  Negative for dizziness, syncope, numbness and headaches.       Objective:      Physical Exam  HENT:      Right Ear: External ear normal.      Left Ear: External ear normal.      Nose: Nose normal.      Mouth/Throat:      Mouth: Mucous membranes are moist.      Pharynx: Oropharynx is clear.   Eyes:      Pupils: Pupils are equal, round, and reactive to light.   Cardiovascular:      Rate and Rhythm: Normal rate and regular rhythm.      Heart sounds: No murmur heard.  Pulmonary:      Breath sounds: Normal breath sounds.   Abdominal:      General: There is no distension.      Palpations: There is no hepatomegaly or splenomegaly.      Tenderness: There is no abdominal tenderness.   Musculoskeletal:      Cervical back: Normal range of motion.   Lymphadenopathy:      Cervical: No cervical adenopathy.      Upper Body:      Right upper body: No axillary adenopathy.      Left upper body: No axillary adenopathy.   Neurological:      Cranial Nerves: No cranial nerve deficit.      Sensory: No sensory deficit.      Motor: Motor function is intact.      Deep Tendon Reflexes: Reflexes are normal and symmetric.         Assessment/Plan       Annual exam.  Check CMP, A1c.  She reports having her colonoscopy outside of [facility].  Requested records.  Discussed Pap smear, pelvic exam.  She declined all

## 2024-04-17 ENCOUNTER — OFFICE VISIT (OUTPATIENT)
Dept: SPINE | Facility: CLINIC | Age: 64
End: 2024-04-17
Payer: MEDICARE

## 2024-04-17 VITALS
HEIGHT: 63 IN | DIASTOLIC BLOOD PRESSURE: 59 MMHG | RESPIRATION RATE: 12 BRPM | WEIGHT: 184.94 LBS | OXYGEN SATURATION: 100 % | HEART RATE: 80 BPM | BODY MASS INDEX: 32.77 KG/M2 | SYSTOLIC BLOOD PRESSURE: 123 MMHG

## 2024-04-17 DIAGNOSIS — M50.30 DDD (DEGENERATIVE DISC DISEASE), CERVICAL: ICD-10-CM

## 2024-04-17 DIAGNOSIS — M70.60 GREATER TROCHANTERIC BURSITIS, UNSPECIFIED LATERALITY: ICD-10-CM

## 2024-04-17 DIAGNOSIS — M53.3 SACROILIAC JOINT PAIN: ICD-10-CM

## 2024-04-17 DIAGNOSIS — M79.18 MYOFASCIAL PAIN SYNDROME: Primary | ICD-10-CM

## 2024-04-17 PROCEDURE — 99999 PR PBB SHADOW E&M-EST. PATIENT-LVL III: CPT | Mod: PBBFAC,,, | Performed by: STUDENT IN AN ORGANIZED HEALTH CARE EDUCATION/TRAINING PROGRAM

## 2024-04-17 PROCEDURE — 3074F SYST BP LT 130 MM HG: CPT | Mod: CPTII,S$GLB,, | Performed by: STUDENT IN AN ORGANIZED HEALTH CARE EDUCATION/TRAINING PROGRAM

## 2024-04-17 PROCEDURE — 3052F HG A1C>EQUAL 8.0%<EQUAL 9.0%: CPT | Mod: CPTII,S$GLB,, | Performed by: STUDENT IN AN ORGANIZED HEALTH CARE EDUCATION/TRAINING PROGRAM

## 2024-04-17 PROCEDURE — 3066F NEPHROPATHY DOC TX: CPT | Mod: CPTII,S$GLB,, | Performed by: STUDENT IN AN ORGANIZED HEALTH CARE EDUCATION/TRAINING PROGRAM

## 2024-04-17 PROCEDURE — 3061F NEG MICROALBUMINURIA REV: CPT | Mod: CPTII,S$GLB,, | Performed by: STUDENT IN AN ORGANIZED HEALTH CARE EDUCATION/TRAINING PROGRAM

## 2024-04-17 PROCEDURE — 1159F MED LIST DOCD IN RCRD: CPT | Mod: CPTII,S$GLB,, | Performed by: STUDENT IN AN ORGANIZED HEALTH CARE EDUCATION/TRAINING PROGRAM

## 2024-04-17 PROCEDURE — 1160F RVW MEDS BY RX/DR IN RCRD: CPT | Mod: CPTII,S$GLB,, | Performed by: STUDENT IN AN ORGANIZED HEALTH CARE EDUCATION/TRAINING PROGRAM

## 2024-04-17 PROCEDURE — 99214 OFFICE O/P EST MOD 30 MIN: CPT | Mod: S$GLB,,, | Performed by: STUDENT IN AN ORGANIZED HEALTH CARE EDUCATION/TRAINING PROGRAM

## 2024-04-17 PROCEDURE — 3008F BODY MASS INDEX DOCD: CPT | Mod: CPTII,S$GLB,, | Performed by: STUDENT IN AN ORGANIZED HEALTH CARE EDUCATION/TRAINING PROGRAM

## 2024-04-17 PROCEDURE — 3078F DIAST BP <80 MM HG: CPT | Mod: CPTII,S$GLB,, | Performed by: STUDENT IN AN ORGANIZED HEALTH CARE EDUCATION/TRAINING PROGRAM

## 2024-04-17 RX ORDER — METHOCARBAMOL 750 MG/1
750 TABLET, FILM COATED ORAL 3 TIMES DAILY PRN
Qty: 90 TABLET | Refills: 2 | Status: SHIPPED | OUTPATIENT
Start: 2024-04-17

## 2024-04-17 NOTE — PROGRESS NOTES
Chronic patient Established Note (Follow up visit)      SUBJECTIVE:    INTERVAL HISTORY 4/17/2024:  Monica Knight presents to the clinic for a follow-up appointment for chronic pain. Current pain intensity is 4/10.  Since the last visit, Monica Knight states:  she had 70% improvement with her bilateral SI and GTB injections.  She feels she is able to function much better now and does not need her medication (dual action tylenol/ibuprofen) as often.  She has looked for exercises online and continues to do light exercises at home.  She is overall satisfied with her pain management thus far.    PRIOR HISTORY:   Interval History 3/8/2024:  Monica Knight presents for follow-up from C7/T1 IL DARNELL on 2/22/2024.  She reports 85% relief that is on-going.  The pain in her neck today is 4/10.  Her main complaint today is of lower back pain with radiation into the right leg and lower leg to the foot and into the superior aspect of the left thigh.  She describes the pain as aching, and numbness at times.  The pain is worse with going from sitting to standing, sleeping on her sides, and getting in and out of the car.  Rest helps.  She reports not starting physical therapy as it is too expensive for her.  She would also like to discuss recent imaging results. She denies fever/night sweats, urinary incontinence, bowel incontinence, significant weight changes, significant motor weakness or changes, or loss of sensations.   Today's pain score in her lower back is 7/10.    Monica Knight presents to the clinic for the evaluation of neck and back pain. The back pain started years ago and the neck pain started in April 2023 following no inciting event and symptoms have been worsening.The pain is located in the lower back area and radiates to the right > left leg (left thigh only).  The neck pain radiates down both arms with tingling in both hands.  The pain is described as dull, numbing, tight band, and tingling and is rated  as 6/10. The pain is rated with a score of  3/10 on the BEST day and a score of 6/10 on the WORST day.  The lower back sometimes gets to a 6 or 7, but the pain has been easing up. Symptoms interfere with daily activity. The lower back pain is exacerbated by Sitting, Standing, and Lifting.  The pain is mitigated by ice and injection. The patient reports 4-6 hours of uninterrupted sleep per night.    Patient denies urinary incontinence and bowel incontinence.  She reports her balance has been getting worse in the past few months.  She states that she relocated recently.  She recently saw Dr. Neff in [facility] who did an epidural injection (Deceber 18th, 2023).  She states that the pain improved, but not for a long time.  She had a neck injection in July 2023.  [place] [facility] [place] and [facility] management), however she had to switch to [facility] since her insurance changed.    Physical Therapy/Home Exercise: June 2023 (had PT for lower back).  She does some leg lifts that she learned from PT.  She also tries to walk at home.      Pain Medications:   - tramadol   - ibuprofen   - tylenol (combined with ibuprofen)   - hydrocodone     report:  Reviewed and consistent with medication use as prescribed.  12/15/2023 11/16/2023 1 Hydrocodone-Acetamin 5-325 Mg 30.00 7 Sherron Hub     Pain Procedures:   Lumbar DARNELL December 2023  Cervical DARNELL July 2023 2/22/2024 - C7/T1 IL DARNELL - 85% relief    Imaging:     XR LUMBAR SPINE 5 VIEW WITH FLEX AND EXT  CLINICAL HISTORY:  Spondylolisthesis, lumbar region  TECHNIQUE:  AP, lateral, and oblique images are performed through the lumbar spine.  COMPARISON:  None  FINDINGS:  Lumbar vertebral body heights are maintained.    Disc space narrowing and endplate changes L5-S1.  Facet arthropathy lower lumbar spine.  Grade 1 anterolisthesis L3 on L4 and L4 on L5 with no significant change with flexion or extension.  Impression:  Degenerative change lower lumbar spine as above.      Electronically signed by: Isela Jackson  Date:                                            01/17/2024  Time:                                           14:39    XR CERVICAL SPINE 5 VIEW WITH FLEX AND EXT  CLINICAL HISTORY:  Spinal stenosis, cervical region  TECHNIQUE:  Five views of the cervical spine plus flexion and extension views were performed.  COMPARISON:  None.  FINDINGS:  Vertebral body heights are maintained.  Disc space narrowing and endplate changes C5-6 and C6-7. oblique views show no significant bony narrowing of the neural foramina.  Straightening of the normal cervical lordosis.  Slight grade 1 retrolisthesis C5-6 with no significant change with flexion or extension  No significant prevertebral soft tissue edema.  Impression:  Degenerative change C5-6 and C6-7.    Electronically signed by: Isela Jackson  Date:                                            01/17/2024  Time:                                           14:38    MRI Lumbar 12/11/23:  IMPRESSION:  Chronic L4-5 spondylolisthesis with new, 3mm anterior offset of L3 relative to L4.  L3-4 broad-based right foraminal 2.6mm disc herniation with superimposed annular bulging and posterior element hypertrophy.  There is moderate canal and severe right greater than left foraminal stenosis.  L4-5 diffuse disc bulge with posterior element hypertrophy.  There is severe canal and right greater than left foraminal stenosis. Facet hypertrophy and left facet effusion identified.  L5-S1 uncovering and bulging of the disc with caudal migration identified. Facet hypertrophy is evident, the left foramen is mildly narrowed.    Signature  Electronically signed: Jared Fernandez MD on 12-, 01:08PM      MRI Cervical 3/17/2023  IMPRESSION:  Multilevel, multifactorial cervical degenerative changes as detailed above.  The most notable levels are C5-6 and C6-7 where there are advanced spinal canal and foraminal stenosis. There is flattening of the spinal cord but  no abnormal cord signal.    Signed: Harman Palomo MD    Pain Disability Index Review:      4/17/2024     1:48 PM 3/8/2024    10:55 AM 1/17/2024     1:13 PM   Last 3 PDI Scores   Pain Disability Index (PDI) 25 7 30       Allergies:   Review of patient's allergies indicates:   Allergen Reactions    Peanut Swelling     Throat swells and gets hives  Throat swells and gets hives  Throat swells and gets hives         Current Medications:   Current Outpatient Medications   Medication Sig Dispense Refill    blood sugar diagnostic (TRUE METRIX GLUCOSE TEST STRIP) Strp Use as directed to check blood sugar once daily. 100 strip 2    blood-glucose meter (TRUE METRIX AIR GLUCOSE METER) Misc Use as directed to check blood sugar once daily. 1 each 0    cetirizine (ZYRTEC) 10 MG tablet Take 1 tablet (10 mg total) by mouth once daily. 90 tablet 1    glipiZIDE 5 MG TR24 Take 1 tablet (5 mg total) by mouth 2 (two) times a day. 180 tablet 3    HYDROcodone-acetaminophen (NORCO) 5-325 mg per tablet Take 1 tablet by mouth every 6 (six) hours as needed for Pain.      ibuprofen (ADVIL,MOTRIN) 800 MG tablet Take 800 mg by mouth 2 (two) times daily.      lancets (TRUEPLUS LANCETS) 33 gauge Misc Use as directed to check blood sugar once daily. 100 each 2    lisinopriL-hydrochlorothiazide (PRINZIDE,ZESTORETIC) 20-12.5 mg per tablet lisinopril 20 mg-hydrochlorothiazide 12.5 mg tablet 90 tablet 3    metFORMIN (GLUCOPHAGE) 1000 MG tablet Take 1 tablet (1,000 mg total) by mouth 2 (two) times daily with meals. 180 tablet 1    rosuvastatin (CRESTOR) 20 MG tablet Take 1 tablet (20 mg total) by mouth once daily. 90 tablet 3    tirzepatide 7.5 mg/0.5 mL PnIj Inject 7.5 mg into the skin every 7 days. 4 Pen 2    methocarbamoL (ROBAXIN) 750 MG Tab Take 1 tablet (750 mg total) by mouth 3 (three) times daily as needed (muscular pain). 90 tablet 2     No current facility-administered medications for this visit.       REVIEW OF SYSTEMS:    GENERAL:  No  "new weight loss, malaise or fevers.  HEENT:  Negative for new frequent or significant headaches.  NECK:  Negative for new lumps, goiter, and significant neck swelling.  RESPIRATORY:  Negative for new cough, wheezing or shortness of breath.  CARDIOVASCULAR:  Negative for new chest pain, leg swelling or palpitations.  GI:  Negative for new abdominal discomfort, blood in stools or black stools or change in bowel habits.  MUSCULOSKELETAL:  See HPI.  SKIN:  Negative for new lesions, rash, and itching.  PSYCH:  Negative for new sleep disturbance, mood disorder  HEMATOLOGY/LYMPHOLOGY:  Negative for new prolonged bleeding, bruising easily or swollen nodes.  NEURO:   No new headaches, syncope, paralysis, seizures or tremors.  All other reviewed and negative other than HPI.    Past Medical History:  Past Medical History:   Diagnosis Date    Asthma     Diabetes mellitus        Past Surgical History:  Past Surgical History:   Procedure Laterality Date    EPIDURAL STEROID INJECTION N/A 2/22/2024    Procedure: CERVICAL C7/T1 DARNELL;  Surgeon: Roxann Randle MD;  Location: [facility];  Service: Pain Management;  Laterality: N/A;  [phone]    INJECTION OF JOINT Bilateral 3/28/2024    Procedure: INJECTION, JOINT BILATERAL SI AND BILATERAL GTB;  Surgeon: Roxann Randle MD;  Location: [facility];  Service: Pain Management;  Laterality: Bilateral;  [phone]  2 WK F/U SHIRA  *SCHEDULE AFTER 3/21*       Family History:  No family history on file.    Social History:  Social History     Socioeconomic History    Marital status: Single   Tobacco Use    Smoking status: Never    Smokeless tobacco: Never   Substance and Sexual Activity    Alcohol use: Not Currently    Drug use: Never    Sexual activity: Yes       OBJECTIVE:    BP (!) 123/59 (BP Location: Left arm, Patient Position: Sitting, BP Method: Large (Automatic))   Pulse 80   Resp 12   Ht 5' 3" (1.6 m)   Wt 83.9 kg (184 lb 15.5 oz)   SpO2 100%   BMI 32.77 kg/m² "     PHYSICAL EXAMINATION:  General appearance: Well appearing, in no acute distress, alert and appropriately communicative.  Psych:  Mood and affect appropriate.  Skin: Skin color, texture, turgor normal, no rashes or lesions, in both upper and lower body for exposed skin.  Head/face:  Atraumatic, normocephalic.  Neck: slight pain to palpation over the cervical paraspinous muscles on left. Spurling Negative. No pain with neck flexion, extension, or lateral flexion. .  Cor: regular rate  Pulm: non-labored breathing  GI: Abdomen non-distended and non-tender.  Back: Straight leg raising in the sitting and supine positions is negative to radicular pain. Slight pain to palpation over the spine or paraspinal muscles on left. Normal range of motion without pain reproduction.  Extremities: No deformities, significant lymphedema, or skin discoloration. No significant open wounds. No major amputations.   Musculoskeletal: hip, sacroiliac and knee provocative maneuvers are negative. Bilateral upper and lower extremity strength is normal and symmetric.  No atrophy or tone abnormalities are noted.  Neuro: Bilateral upper and lower extremity coordination and muscle stretch reflexes abnormalities noted: none.  Bolanos and/or Clonus: negative; loss of sensation to light touch: none  Gait: normal    CMP  Sodium   Date Value Ref Range Status   01/10/2024 139 136 - 145 mmol/L Final     Potassium   Date Value Ref Range Status   01/10/2024 4.0 3.5 - 5.1 mmol/L Final     Chloride   Date Value Ref Range Status   01/10/2024 107 95 - 110 mmol/L Final     CO2   Date Value Ref Range Status   01/10/2024 25 23 - 29 mmol/L Final     Glucose   Date Value Ref Range Status   01/10/2024 328 (H) 70 - 110 mg/dL Final     BUN   Date Value Ref Range Status   01/10/2024 9 8 - 23 mg/dL Final     Creatinine   Date Value Ref Range Status   01/10/2024 0.9 0.5 - 1.4 mg/dL Final     Calcium   Date Value Ref Range Status   01/10/2024 9.3 8.7 - 10.5 mg/dL Final      Total Protein   Date Value Ref Range Status   01/10/2024 7.1 6.0 - 8.4 g/dL Final     Albumin   Date Value Ref Range Status   01/10/2024 3.8 3.5 - 5.2 g/dL Final     Total Bilirubin   Date Value Ref Range Status   01/10/2024 0.4 0.1 - 1.0 mg/dL Final     Comment:     For infants and newborns, interpretation of results should be based  on gestational age, weight and in agreement with clinical  observations.    Premature Infant recommended reference ranges:  Up to 24 hours.............<8.0 mg/dL  Up to 48 hours............<12.0 mg/dL  3-5 days..................<15.0 mg/dL  6-29 days.................<15.0 mg/dL       Alkaline Phosphatase   Date Value Ref Range Status   01/10/2024 63 55 - 135 U/L Final     AST   Date Value Ref Range Status   01/10/2024 23 10 - 40 U/L Final     ALT   Date Value Ref Range Status   01/10/2024 24 10 - 44 U/L Final     Anion Gap   Date Value Ref Range Status   01/10/2024 7 (L) 8 - 16 mmol/L Final     eGFR   Date Value Ref Range Status   01/10/2024 >60.0 >60 mL/min/1.73 m^2 Final       ASSESSMENT: 63 y.o. year old female with chronic pain, consistent with:     1. Myofascial pain syndrome  methocarbamoL (ROBAXIN) 750 MG Tab      2. DDD (degenerative disc disease), cervical        3. Sacroiliac joint pain        4. Greater trochanteric bursitis, unspecified laterality          IMPRESSION: Monica Knight presents today for chronic lower back pain. History and physical exam are consistent with bilateral sacroiliitis and bilateral greater trochanteric bursitis.  Imaging is consistent with cervical and lumbar degenerative disc disease with central and foraminal stenosis at multiple levels.  She has done well with her cervical epidural injection and bilateral SI/GTB injections. At this point, we can continue with conservative management with physical therapy and medications.    PLAN:   - I have stressed the importance of physical activity and a home exercise plan to help with pain and improve  health.  - Patient can continue with medications for now since they are providing benefits, using them appropriately, and without side effects.  - start robaxin 750mg every 8 hours as needed for muscular pain.  - given [facility] spine conditioning handout; instructed to participate in regular home exercises at least 3 times a week for 15 minutes at a time.  They can do this while waiting for physical therapy to start.  - RTC as needed  - Counseled patient regarding the importance of activity modification and physical therapy.    The above plan and management options were discussed at length with patient. Patient is in agreement with the above and verbalized understanding.    Roxann Randle  04/17/2024

## 2024-04-30 ENCOUNTER — TELEPHONE (OUTPATIENT)
Dept: OTOLARYNGOLOGY | Facility: CLINIC | Age: 64
End: 2024-04-30
Payer: MEDICARE

## 2024-05-13 DIAGNOSIS — E11.9 TYPE 2 DIABETES MELLITUS WITHOUT COMPLICATION, WITHOUT LONG-TERM CURRENT USE OF INSULIN: ICD-10-CM

## 2024-05-14 RX ORDER — TIRZEPATIDE 7.5 MG/.5ML
INJECTION, SOLUTION SUBCUTANEOUS
Qty: 2 ML | Refills: 3 | Status: SHIPPED | OUTPATIENT
Start: 2024-05-14 | End: 2024-05-20

## 2024-05-16 ENCOUNTER — LAB VISIT (OUTPATIENT)
Dept: LAB | Facility: HOSPITAL | Age: 64
End: 2024-05-16
Attending: INTERNAL MEDICINE
Payer: MEDICARE

## 2024-05-16 DIAGNOSIS — E11.9 DIABETES MELLITUS WITHOUT COMPLICATION: ICD-10-CM

## 2024-05-16 DIAGNOSIS — I10 HYPERTENSION, UNSPECIFIED TYPE: ICD-10-CM

## 2024-05-16 LAB
ALBUMIN SERPL BCP-MCNC: 3.8 G/DL (ref 3.5–5.2)
ALP SERPL-CCNC: 74 U/L (ref 55–135)
ALT SERPL W/O P-5'-P-CCNC: 11 U/L (ref 10–44)
ANION GAP SERPL CALC-SCNC: 8 MMOL/L (ref 8–16)
AST SERPL-CCNC: 16 U/L (ref 10–40)
BILIRUB SERPL-MCNC: 0.3 MG/DL (ref 0.1–1)
BUN SERPL-MCNC: 9 MG/DL (ref 8–23)
CALCIUM SERPL-MCNC: 9.6 MG/DL (ref 8.7–10.5)
CHLORIDE SERPL-SCNC: 104 MMOL/L (ref 95–110)
CO2 SERPL-SCNC: 27 MMOL/L (ref 23–29)
CREAT SERPL-MCNC: 0.9 MG/DL (ref 0.5–1.4)
EST. GFR  (NO RACE VARIABLE): >60 ML/MIN/1.73 M^2
ESTIMATED AVG GLUCOSE: 171 MG/DL (ref 68–131)
GLUCOSE SERPL-MCNC: 236 MG/DL (ref 70–110)
HBA1C MFR BLD: 7.6 % (ref 4–5.6)
POTASSIUM SERPL-SCNC: 4.4 MMOL/L (ref 3.5–5.1)
PROT SERPL-MCNC: 7.4 G/DL (ref 6–8.4)
SODIUM SERPL-SCNC: 139 MMOL/L (ref 136–145)

## 2024-05-16 PROCEDURE — 83036 HEMOGLOBIN GLYCOSYLATED A1C: CPT | Mod: HCNC | Performed by: INTERNAL MEDICINE

## 2024-05-16 PROCEDURE — 80053 COMPREHEN METABOLIC PANEL: CPT | Mod: HCNC | Performed by: INTERNAL MEDICINE

## 2024-05-16 PROCEDURE — 36415 COLL VENOUS BLD VENIPUNCTURE: CPT | Mod: HCNC | Performed by: INTERNAL MEDICINE

## 2024-05-20 ENCOUNTER — OFFICE VISIT (OUTPATIENT)
Dept: ENDOCRINOLOGY | Facility: CLINIC | Age: 64
End: 2024-05-20
Payer: MEDICARE

## 2024-05-20 VITALS
HEIGHT: 63 IN | BODY MASS INDEX: 32.97 KG/M2 | HEART RATE: 85 BPM | DIASTOLIC BLOOD PRESSURE: 66 MMHG | OXYGEN SATURATION: 97 % | SYSTOLIC BLOOD PRESSURE: 126 MMHG | WEIGHT: 186.06 LBS

## 2024-05-20 DIAGNOSIS — E11.9 TYPE 2 DIABETES MELLITUS WITHOUT COMPLICATION, WITHOUT LONG-TERM CURRENT USE OF INSULIN: Primary | ICD-10-CM

## 2024-05-20 DIAGNOSIS — K31.83 ACHLORHYDRIA: ICD-10-CM

## 2024-05-20 PROCEDURE — 3066F NEPHROPATHY DOC TX: CPT | Mod: HCNC,CPTII,S$GLB, | Performed by: INTERNAL MEDICINE

## 2024-05-20 PROCEDURE — 3008F BODY MASS INDEX DOCD: CPT | Mod: HCNC,CPTII,S$GLB, | Performed by: INTERNAL MEDICINE

## 2024-05-20 PROCEDURE — 3061F NEG MICROALBUMINURIA REV: CPT | Mod: HCNC,CPTII,S$GLB, | Performed by: INTERNAL MEDICINE

## 2024-05-20 PROCEDURE — G2211 COMPLEX E/M VISIT ADD ON: HCPCS | Mod: HCNC,S$GLB,, | Performed by: INTERNAL MEDICINE

## 2024-05-20 PROCEDURE — 3078F DIAST BP <80 MM HG: CPT | Mod: HCNC,CPTII,S$GLB, | Performed by: INTERNAL MEDICINE

## 2024-05-20 PROCEDURE — 99999 PR PBB SHADOW E&M-EST. PATIENT-LVL IV: CPT | Mod: PBBFAC,HCNC,, | Performed by: INTERNAL MEDICINE

## 2024-05-20 PROCEDURE — 99204 OFFICE O/P NEW MOD 45 MIN: CPT | Mod: HCNC,S$GLB,, | Performed by: INTERNAL MEDICINE

## 2024-05-20 PROCEDURE — 3074F SYST BP LT 130 MM HG: CPT | Mod: HCNC,CPTII,S$GLB, | Performed by: INTERNAL MEDICINE

## 2024-05-20 PROCEDURE — 1159F MED LIST DOCD IN RCRD: CPT | Mod: HCNC,CPTII,S$GLB, | Performed by: INTERNAL MEDICINE

## 2024-05-20 PROCEDURE — 3051F HG A1C>EQUAL 7.0%<8.0%: CPT | Mod: HCNC,CPTII,S$GLB, | Performed by: INTERNAL MEDICINE

## 2024-05-20 RX ORDER — GLIPIZIDE 5 MG/1
5 TABLET ORAL
Qty: 30 TABLET | Refills: 11 | Status: SHIPPED | OUTPATIENT
Start: 2024-05-20 | End: 2025-05-20

## 2024-05-20 NOTE — ASSESSMENT & PLAN NOTE
Diagnosed 11 years ago, BMI 32    PLAN:  Increase mounjaro to 10 mg weekly   Change glipizide to immediate release to avoid hypoglycemia   Continue dietary changes  Exercise is difficult    Repeat A1C in three months with PCP, repeat in six months with me and schedule f/u visit one week later with me.

## 2024-05-20 NOTE — PATIENT INSTRUCTIONS
Increase mounaro to 10 mg weekly   Switch from the extended relase glipizide to the immediate release. Please take glizipide 5 mg thirty minutes before your biggest meal.     Repeat labs in three months with primary care     Repeat labs in six months with me.     Goal is to get your A1C as close to 7% or a little lower as possible without low blood sugars.         For your appointment in November please call July 1.

## 2024-05-20 NOTE — PROGRESS NOTES
"Subjective:      Patient ID: Monica Knight is a 63 y.o. female.    Chief Complaint:  Diabetes      History of Present Illness  T2DM  Diagnosed 11 years ago   Known complications: steroid injections for back pain    Current Diabetes Regimen:  Mounjaro 7.5 mg weekly -- unable to get it right now  Metformin 1000 mg one tablet twice a day --> started eleven year ago  Glipizide 5 mg extended release daily --> takes it as needed, has low blood sugars when she takes it.     Diet/Exercise:  Exercise: limited due to back pain   Reports bloating/gas with better diet    Recent Hgb A1C:  Lab Results   Component Value Date    HGBA1C 7.6 (H) 05/16/2024       Glucose Monitoring:  Fasting 130-140  Improved blood sugars throughout the day     Hypoglycemic Episodes:  With glipizide     Screening / DM Complications:    Nephropathy:  ACEi/ARB: Taking  Lab Results   Component Value Date    MICALBCREAT 8.8 01/10/2024       Last Lipid Panel:  Statin: Taking, rosuvastatin 20 mg daily   Lab Results   Component Value Date    LDLCALC 128.2 01/10/2024       Neuropathy:denies   Last foot exam Most Recent Foot Exam Date: Not Found  Last eye exam Most Recent Eye Exam Date: Not Found;  no laser surgery or DR    B12:  No results found for: "[id number]"    Review of Systems  Denies recent illness     Objective:   Physical Exam  Vitals:    05/20/24 1004   BP: 126/66   BP Location: Right arm   Patient Position: Sitting   BP Method: Large (Manual)   Pulse: 85   SpO2: 97%   Weight: 84.4 kg (186 lb 1.1 oz)   Height: 5' 3" (1.6 m)       BP Readings from Last 3 Encounters:   05/20/24 126/66   04/17/24 (!) 123/59   04/02/24 138/74     Wt Readings from Last 1 Encounters:   05/20/24 1004 84.4 kg (186 lb 1.1 oz)         Body mass index is 32.96 kg/m².    Lab Review:       Lab Results   Component Value Date    HGBA1C 7.6 (H) 05/16/2024     Lab Results   Component Value Date    CHOL 215 (H) 01/10/2024    HDL 71 01/10/2024    LDLCALC 128.2 01/10/2024    TRIG " 79 01/10/2024    CHOLHDL 33.0 01/10/2024     Lab Results   Component Value Date     05/16/2024    K 4.4 05/16/2024     05/16/2024    CO2 27 05/16/2024     (H) 05/16/2024    BUN 9 05/16/2024    CREATININE 0.9 05/16/2024    CALCIUM 9.6 05/16/2024    PROT 7.4 05/16/2024    ALBUMIN 3.8 05/16/2024    BILITOT 0.3 05/16/2024    ALKPHOS 74 05/16/2024    AST 16 05/16/2024    ALT 11 05/16/2024    ANIONGAP 8 05/16/2024    ESTGFRAFRICA >60.0 10/25/2021    EGFRNONAA >60.0 10/25/2021    TSH 0.845 01/10/2024         Assessment and Plan     Type 2 diabetes mellitus without complication, without long-term current use of insulin  Diagnosed 11 years ago, BMI 32    PLAN:  Increase mounjaro to 10 mg weekly   Change glipizide to immediate release to avoid hypoglycemia   Continue dietary changes  Exercise is difficult    Repeat A1C in three months with PCP, repeat in six months with me and schedule f/u visit one week later with me.     BMI 32.0-32.9,adult  Mounjaro 10 mg may help   Eating a little better

## 2024-06-10 ENCOUNTER — PATIENT MESSAGE (OUTPATIENT)
Dept: INTERNAL MEDICINE | Facility: CLINIC | Age: 64
End: 2024-06-10
Payer: MEDICARE

## 2024-07-11 DIAGNOSIS — Z78.0 MENOPAUSE: ICD-10-CM

## 2024-07-18 ENCOUNTER — TELEPHONE (OUTPATIENT)
Dept: INTERNAL MEDICINE | Facility: CLINIC | Age: 64
End: 2024-07-18
Payer: MEDICARE

## 2024-07-18 NOTE — TELEPHONE ENCOUNTER
Called patient checking to see if they had their Diabetic eye exam done this year.   If have not had eye exam done this year, offering the DM eyecam.   Left [facility] request call back [phone]   Gabbie Chowdhury RN HC

## 2024-07-23 ENCOUNTER — TELEPHONE (OUTPATIENT)
Dept: DERMATOLOGY | Facility: CLINIC | Age: 64
End: 2024-07-23
Payer: MEDICARE

## 2024-07-23 NOTE — TELEPHONE ENCOUNTER
Called patient in order to get patient scheduled. Offered an August appointment. Pt stated she is going to look elsewhere first to see if she can find sooner and call us back if not.     ----- Message from Camille Arce sent at 7/23/2024 11:35 AM CDT -----  Regarding: appt access  Contact: [phone]  Requesting Appt    Patient is requesting an appointment. No appt is available in Epic. Caller would like for a nurse/MA to call to schedule an appointment.    Name of Caller: Pt  Appointment Access: Dry Skin around T-zone  Symptoms: itchy/flaky   Call Back Number: [phone]  Additional Information: sooner than later, possible this month.

## 2024-07-24 ENCOUNTER — OFFICE VISIT (OUTPATIENT)
Dept: PAIN MEDICINE | Facility: CLINIC | Age: 64
End: 2024-07-24
Payer: MEDICARE

## 2024-07-24 VITALS
DIASTOLIC BLOOD PRESSURE: 66 MMHG | RESPIRATION RATE: 18 BRPM | TEMPERATURE: 98 F | OXYGEN SATURATION: 100 % | SYSTOLIC BLOOD PRESSURE: 112 MMHG | BODY MASS INDEX: 32.5 KG/M2 | HEART RATE: 82 BPM | HEIGHT: 63 IN | WEIGHT: 183.44 LBS

## 2024-07-24 DIAGNOSIS — M54.12 CERVICAL RADICULOPATHY: ICD-10-CM

## 2024-07-24 DIAGNOSIS — M54.16 LUMBAR RADICULOPATHY: Primary | ICD-10-CM

## 2024-07-24 DIAGNOSIS — M46.1 SACROILIITIS: ICD-10-CM

## 2024-07-24 PROCEDURE — 99999 PR PBB SHADOW E&M-EST. PATIENT-LVL IV: CPT | Mod: PBBFAC,HCNC,, | Performed by: STUDENT IN AN ORGANIZED HEALTH CARE EDUCATION/TRAINING PROGRAM

## 2024-07-24 RX ORDER — LIDOCAINE 50 MG/G
1 PATCH TOPICAL DAILY
Qty: 30 PATCH | Refills: 2 | Status: SHIPPED | OUTPATIENT
Start: 2024-07-24

## 2024-07-24 NOTE — H&P (VIEW-ONLY)
Chronic patient Established Note (Follow up visit)      SUBJECTIVE:  INTERVAL HISTORY 7/24/2024:   Monica Knight is a 64 y.o. female presenting to clinic for evaluation of neck and lower back pain. Patient reports that C7/T1 IL DARNELL on 2/22/2024 provided significant relief of symptoms for several months, currently her pain is 4/10. She notes significant numbness to bilateral hands over past several months, R>L, that is worst at night. She denies weakness and denies dropping objects. The patient endorses new difficulty with balance for past 5-6 weeks. States that she is feeling unstable, particularly when getting up from seated position or reaching for objects. She denies dizziness or recent falls.     She endorses worsening of her chronic lower back pain and radiation of the pain down to ankle on the L, and down to calf on the R. Patient has new job working as a  that requires standing for many hours and she is having difficulty managing her pain. She has been doing the exercises that were provided during previous visit, alternating between hot and cold compress, topical Salonpas, taking PRN ibuprofen, tylenol, and Robaxin but pain is persistent. She reports her lower back pain is a 6/10 on a good day, 8/10 on bad day. She denies incontinence, fever, night sweats, unintentional weight loss, or further acute complaints at this time.     INTERVAL HISTORY 4/17/2024:  Monica Knight presents to the clinic for a follow-up appointment for chronic pain. Current pain intensity is 4/10.  Since the last visit, Monica Knight states:  she had 70% improvement with her bilateral SI and GTB injections.  She feels she is able to function much better now and does not need her medication (dual action tylenol/ibuprofen) as often.  She has looked for exercises online and continues to do light exercises at home.  She is overall satisfied with her pain management thus far.    PRIOR HISTORY:   Interval History  3/8/2024:  Monica Knight presents for follow-up from C7/T1 IL DARNELL on 2/22/2024.  She reports 85% relief that is on-going.  The pain in her neck today is 4/10.  Her main complaint today is of lower back pain with radiation into the right leg and lower leg to the foot and into the superior aspect of the left thigh.  She describes the pain as aching, and numbness at times.  The pain is worse with going from sitting to standing, sleeping on her sides, and getting in and out of the car.  Rest helps.  She reports not starting physical therapy as it is too expensive for her.  She would also like to discuss recent imaging results. She denies fever/night sweats, urinary incontinence, bowel incontinence, significant weight changes, significant motor weakness or changes, or loss of sensations.   Today's pain score in her lower back is 7/10.    Monica Knight presents to the clinic for the evaluation of neck and back pain. The back pain started years ago and the neck pain started in April 2023 following no inciting event and symptoms have been worsening.The pain is located in the lower back area and radiates to the right > left leg (left thigh only).  The neck pain radiates down both arms with tingling in both hands.  The pain is described as dull, numbing, tight band, and tingling and is rated as 6/10. The pain is rated with a score of  3/10 on the BEST day and a score of 6/10 on the WORST day.  The lower back sometimes gets to a 6 or 7, but the pain has been easing up. Symptoms interfere with daily activity. The lower back pain is exacerbated by Sitting, Standing, and Lifting.  The pain is mitigated by ice and injection. The patient reports 4-6 hours of uninterrupted sleep per night.    Patient denies urinary incontinence and bowel incontinence.  She reports her balance has been getting worse in the past few months.  She states that she relocated recently.  She recently saw Dr. Neff in [facility] who did an epidural  injection (Deceber 18th, 2023).  She states that the pain improved, but not for a long time.  She had a neck injection in July 2023.  [place] [facility] [place] however she had to switch to [facility] since her insurance changed.    Physical Therapy/Home Exercise: June 2023 (had PT for lower back).  She does some leg lifts that she learned from PT.  She also tries to walk at home.      Pain Medications:   - tramadol   - ibuprofen   - tylenol (combined with ibuprofen)   - hydrocodone     report:  Reviewed and consistent with medication use as prescribed.  12/15/2023 11/16/2023 1 Hydrocodone-Acetamin 5-325 Mg 30.00 7 Sherron Hub     Pain Procedures:   Lumbar DARNELL December 2023  Cervical DARNELL July 2023 2/22/2024 - C7/T1 IL DARNELL - 85% relief  3/28/2024 - bilateral SI and GTB - 75% relief    Imaging:   XR HIPS BILATERAL 2 VIEW INCL AP PELVIS 3/28/24  CLINICAL HISTORY:  Trochanteric bursitis, right hip  TECHNIQUE:  AP view of the pelvis and frogleg lateral views of both hips were performed.  COMPARISON:  None.  FINDINGS:  No fracture or dislocation.  Hip joint spaces are preserved.  Degenerative changes in the lower lumbar spine.  No soft tissue swelling or calcifications.\  Impression:  As above.  Electronically signed by:Favio Priest  Date:                                            03/08/2024  Time:                                           13:20    XR LUMBAR SPINE 5 VIEW WITH FLEX AND EXT  CLINICAL HISTORY:  Spondylolisthesis, lumbar region  TECHNIQUE:  AP, lateral, and oblique images are performed through the lumbar spine.  COMPARISON:  None  FINDINGS:  Lumbar vertebral body heights are maintained.    Disc space narrowing and endplate changes L5-S1.  Facet arthropathy lower lumbar spine.  Grade 1 anterolisthesis L3 on L4 and L4 on L5 with no significant change with flexion or extension.  Impression:  Degenerative change lower lumbar spine as above.     Electronically signed by: Isela  Vitter  Date:                                            01/17/2024  Time:                                           14:39    XR CERVICAL SPINE 5 VIEW WITH FLEX AND EXT  CLINICAL HISTORY:  Spinal stenosis, cervical region  TECHNIQUE:  Five views of the cervical spine plus flexion and extension views were performed.  COMPARISON:  None.  FINDINGS:  Vertebral body heights are maintained.  Disc space narrowing and endplate changes C5-6 and C6-7. oblique views show no significant bony narrowing of the neural foramina.  Straightening of the normal cervical lordosis.  Slight grade 1 retrolisthesis C5-6 with no significant change with flexion or extension  No significant prevertebral soft tissue edema.  Impression:  Degenerative change C5-6 and C6-7.    Electronically signed by: Isela Jackson  Date:                                            01/17/2024  Time:                                           14:38    MRI Lumbar 12/11/23:  IMPRESSION:  Chronic L4-5 spondylolisthesis with new, 3mm anterior offset of L3 relative to L4.  L3-4 broad-based right foraminal 2.6mm disc herniation with superimposed annular bulging and posterior element hypertrophy.  There is moderate canal and severe right greater than left foraminal stenosis.  L4-5 diffuse disc bulge with posterior element hypertrophy.  There is severe canal and right greater than left foraminal stenosis. Facet hypertrophy and left facet effusion identified.  L5-S1 uncovering and bulging of the disc with caudal migration identified. Facet hypertrophy is evident, the left foramen is mildly narrowed.    Signature  Electronically signed: Jared Fernandez MD on 12-, 01:08PM      MRI Cervical 3/17/2023  IMPRESSION:  Multilevel, multifactorial cervical degenerative changes as detailed above.  The most notable levels are C5-6 and C6-7 where there are advanced spinal canal and foraminal stenosis. There is flattening of the spinal cord but no abnormal cord signal.    Signed:  Harman Palomo MD    Pain Disability Index Review:      7/24/2024     2:23 PM 4/17/2024     1:48 PM 3/8/2024    10:55 AM   Last 3 PDI Scores   Pain Disability Index (PDI) 35 25 7       Allergies:   Review of patient's allergies indicates:   Allergen Reactions    Peanut Swelling     Throat swells and gets hives  Throat swells and gets hives  Throat swells and gets hives         Current Medications:   Current Outpatient Medications   Medication Sig Dispense Refill    blood sugar diagnostic (TRUE METRIX GLUCOSE TEST STRIP) Strp Use as directed to check blood sugar once daily. 100 strip 2    blood-glucose meter (TRUE METRIX AIR GLUCOSE METER) Misc Use as directed to check blood sugar once daily. 1 each 0    cetirizine (ZYRTEC) 10 MG tablet Take 1 tablet (10 mg total) by mouth once daily. 90 tablet 1    glipiZIDE (GLUCOTROL) 5 MG tablet Take 1 tablet (5 mg total) by mouth daily with breakfast. 30 tablet 11    ibuprofen (ADVIL,MOTRIN) 800 MG tablet Take 800 mg by mouth 2 (two) times daily.      lancets (TRUEPLUS LANCETS) 33 gauge Misc Use as directed to check blood sugar once daily. 100 each 2    metFORMIN (GLUCOPHAGE) 1000 MG tablet Take 1 tablet (1,000 mg total) by mouth 2 (two) times daily with meals. 180 tablet 1    methocarbamoL (ROBAXIN) 750 MG Tab Take 1 tablet (750 mg total) by mouth 3 (three) times daily as needed (muscular pain). 90 tablet 2    rosuvastatin (CRESTOR) 20 MG tablet Take 1 tablet (20 mg total) by mouth once daily. 90 tablet 3    tirzepatide 10 mg/0.5 mL PnIj Inject 10 mg into the skin every 7 days. 12 Pen 3    tirzepatide 10 mg/0.5 mL PnIj Inject 10 mg into the skin every 7 days. 4 Pen 0    tirzepatide 7.5 mg/0.5 mL PnIj Inject 7.5 mg into the skin every 7 days. 12 Pen 1    HYDROcodone-acetaminophen (NORCO) 5-325 mg per tablet Take 1 tablet by mouth every 6 (six) hours as needed for Pain.      LIDOcaine (LIDODERM) 5 % Place 1 patch onto the skin once daily. Remove & Discard patch within 12  hours or as directed by MD 30 patch 2    lisinopriL-hydrochlorothiazide (PRINZIDE,ZESTORETIC) 20-12.5 mg per tablet lisinopril 20 mg-hydrochlorothiazide 12.5 mg tablet 90 tablet 3     No current facility-administered medications for this visit.       REVIEW OF SYSTEMS:    GENERAL:  No new weight loss, malaise or fevers.  HEENT:  Negative for new frequent or significant headaches.  NECK:  Negative for new lumps, goiter, and significant neck swelling.  RESPIRATORY:  Negative for new cough, wheezing or shortness of breath.  CARDIOVASCULAR:  Negative for new chest pain, leg swelling or palpitations.  GI:  Negative for new abdominal discomfort, blood in stools or black stools or change in bowel habits.  MUSCULOSKELETAL:  See HPI.  SKIN:  Negative for new lesions, rash, and itching.  PSYCH:  Negative for new sleep disturbance, mood disorder  HEMATOLOGY/LYMPHOLOGY:  Negative for new prolonged bleeding, bruising easily or swollen nodes.  NEURO:   No new headaches, syncope, paralysis, seizures or tremors. Numbness to bilateral hands, new difficulty with balance   All other reviewed and negative other than HPI.    Past Medical History:  Past Medical History:   Diagnosis Date    Asthma     Diabetes mellitus        Past Surgical History:  Past Surgical History:   Procedure Laterality Date    EPIDURAL STEROID INJECTION N/A 2/22/2024    Procedure: CERVICAL C7/T1 DARNELL;  Surgeon: Roxann Randle MD;  Location: [facility];  Service: Pain Management;  Laterality: N/A;  [phone]    INJECTION OF JOINT Bilateral 3/28/2024    Procedure: INJECTION, JOINT BILATERAL SI AND BILATERAL GTB;  Surgeon: Roxann Randle MD;  Location: [facility];  Service: Pain Management;  Laterality: Bilateral;  [phone]  2 WK F/U SHIRA  *SCHEDULE AFTER 3/21*       Family History:  No family history on file.    Social History:  Social History     Socioeconomic History    Marital status: Single   Tobacco Use    Smoking status: Never    Smokeless tobacco:  "Never   Substance and Sexual Activity    Alcohol use: Not Currently    Drug use: Never    Sexual activity: Yes       OBJECTIVE:    /66   Pulse 82   Temp 98 °F (36.7 °C)   Resp 18   Ht 5' 3" (1.6 m)   Wt 83.2 kg (183 lb 6.8 oz)   SpO2 100%   BMI 32.49 kg/m²     PHYSICAL EXAMINATION:  General appearance: Well appearing, in no acute distress, alert and appropriately communicative.  Psych:  Mood and affect appropriate.  Skin: Skin color, texture, turgor normal, no rashes or lesions, in both upper and lower body for exposed skin.  Head/face:  Atraumatic, normocephalic.  Neck: Pain with neck flexion, extension, and lateral flexion  Cor: regular rate  Pulm: non-labored breathing  GI: Abdomen non-distended and non-tender.  Back: Straight leg raising in the sitting and supine positions is positive for radicular pain bilaterally. Pain to palpation over paraspinal muscles bilaterally. ROM limited by pain.  Extremities: No deformities, significant lymphedema, or skin discoloration. No significant open wounds. No major amputations.   Musculoskeletal: Hips are tender to palpation bilaterally, joseph test reproduces pain. Bilateral upper and lower extremity strength is normal and symmetric.  No atrophy or tone abnormalities are noted.  Neuro: Bilateral upper and lower extremity coordination and muscle stretch reflexes abnormalities noted: none.  Bolanos and/or Clonus: negative; loss of sensation to light touch: none  Gait: normal    CMP  Sodium   Date Value Ref Range Status   05/16/2024 139 136 - 145 mmol/L Final     Potassium   Date Value Ref Range Status   05/16/2024 4.4 3.5 - 5.1 mmol/L Final     Chloride   Date Value Ref Range Status   05/16/2024 104 95 - 110 mmol/L Final     CO2   Date Value Ref Range Status   05/16/2024 27 23 - 29 mmol/L Final     Glucose   Date Value Ref Range Status   05/16/2024 236 (H) 70 - 110 mg/dL Final     BUN   Date Value Ref Range Status   05/16/2024 9 8 - 23 mg/dL Final     Creatinine "   Date Value Ref Range Status   05/16/2024 0.9 0.5 - 1.4 mg/dL Final     Calcium   Date Value Ref Range Status   05/16/2024 9.6 8.7 - 10.5 mg/dL Final     Total Protein   Date Value Ref Range Status   05/16/2024 7.4 6.0 - 8.4 g/dL Final     Albumin   Date Value Ref Range Status   05/16/2024 3.8 3.5 - 5.2 g/dL Final     Total Bilirubin   Date Value Ref Range Status   05/16/2024 0.3 0.1 - 1.0 mg/dL Final     Comment:     For infants and newborns, interpretation of results should be based  on gestational age, weight and in agreement with clinical  observations.    Premature Infant recommended reference ranges:  Up to 24 hours.............<8.0 mg/dL  Up to 48 hours............<12.0 mg/dL  3-5 days..................<15.0 mg/dL  6-29 days.................<15.0 mg/dL       Alkaline Phosphatase   Date Value Ref Range Status   05/16/2024 74 55 - 135 U/L Final     AST   Date Value Ref Range Status   05/16/2024 16 10 - 40 U/L Final     ALT   Date Value Ref Range Status   05/16/2024 11 10 - 44 U/L Final     Anion Gap   Date Value Ref Range Status   05/16/2024 8 8 - 16 mmol/L Final     eGFR   Date Value Ref Range Status   05/16/2024 >60.0 >60 mL/min/1.73 m^2 Final       ASSESSMENT: 64 y.o. year old female with chronic neck pain, lower back pain and new numbness to bilateral hands, R>L, and new difficulty with balance.      1. Lumbar radiculopathy  Procedure Order to Pain Management    CANCELED: Procedure Order to Pain Management      2. Sacroiliitis  LIDOcaine (LIDODERM) 5 %      3. Cervical radiculopathy  Ambulatory referral/consult to Neurosurgery          IMPRESSION: Monica Knight presents today for chronic lower back pain. History and physical exam are consistent with cervical and lumbar radiculopathy. MRI from 1/17/24 done at an outside facility ([facility] - on [facility]) reviewed independently. Imaging is consistent with cervical stenosis at multiple levels with central and foraminal stenosis at multiple levels which is  worst at C6/7 and lumbar degenerative disc disease with spondylolisthesis, central narrowing, and foraminal narrowing at multiple levels, of which the worst is at L4/5 bilaterally.  She has done well in the past with her cervical epidural injection and bilateral SI/GTB injections, however she is now having issues with balance and persistent cervical radiculopathy.  We will send her for evaluation regarding her cervical radiculopathy and new balance issues, prior to considering repeat cervical epidural injections.  Regarding her lumbar radiculopathy, she may benefit from lumbar epidural injections.    PLAN:   - Provided with prescription for lidoderm patches   - Referral to neurosurgery. Patient saw Dr. Cole in 2021. Given new upper extremity symptoms and difficulty with balance, recommended to patient that she see Dr. Cole to discuss possible surgical intervention.   - MRI lumbar spine without contrast ordered   - Schedule Bilateral L4/L5 TFESI with follow up 2 weeks after   - I have stressed the importance of physical activity and a home exercise plan to help with pain and improve health.  - Patient can continue with medications for now since they are providing benefits, using them appropriately, and without side effects.  - RTC after epidural injection  - Counseled patient regarding the importance of activity modification and physical therapy.    The above plan and management options were discussed at length with patient. Patient is in agreement with the above and verbalized understanding.    Mariajose Wu  07/24/2024     I have personally reviewed the history and exam of this patient and agree with the student/resident/fellow/NPs note as stated above.  I have seen the patient and personally examined them as appropriate.  I have personally discussed the plan of care with the provider and patient and have reviewed and/or edited the plan of care as appropriate.  All questions have been answered to the best of  my ability.    Roxann Randle MD PhD

## 2024-07-24 NOTE — PROGRESS NOTES
Chronic patient Established Note (Follow up visit)      SUBJECTIVE:  INTERVAL HISTORY 7/24/2024:   Monica Knight is a 64 y.o. female presenting to clinic for evaluation of neck and lower back pain. Patient reports that C7/T1 IL DARNELL on 2/22/2024 provided significant relief of symptoms for several months, currently her pain is 4/10. She notes significant numbness to bilateral hands over past several months, R>L, that is worst at night. She denies weakness and denies dropping objects. The patient endorses new difficulty with balance for past 5-6 weeks. States that she is feeling unstable, particularly when getting up from seated position or reaching for objects. She denies dizziness or recent falls.     She endorses worsening of her chronic lower back pain and radiation of the pain down to ankle on the L, and down to calf on the R. Patient has new job working as a  that requires standing for many hours and she is having difficulty managing her pain. She has been doing the exercises that were provided during previous visit, alternating between hot and cold compress, topical Salonpas, taking PRN ibuprofen, tylenol, and Robaxin but pain is persistent. She reports her lower back pain is a 6/10 on a good day, 8/10 on bad day. She denies incontinence, fever, night sweats, unintentional weight loss, or further acute complaints at this time.     INTERVAL HISTORY 4/17/2024:  Monica Knight presents to the clinic for a follow-up appointment for chronic pain. Current pain intensity is 4/10.  Since the last visit, Monica Knight states:  she had 70% improvement with her bilateral SI and GTB injections.  She feels she is able to function much better now and does not need her medication (dual action tylenol/ibuprofen) as often.  She has looked for exercises online and continues to do light exercises at home.  She is overall satisfied with her pain management thus far.    PRIOR HISTORY:   Interval History  3/8/2024:  Monica Knight presents for follow-up from C7/T1 IL DARNELL on 2/22/2024.  She reports 85% relief that is on-going.  The pain in her neck today is 4/10.  Her main complaint today is of lower back pain with radiation into the right leg and lower leg to the foot and into the superior aspect of the left thigh.  She describes the pain as aching, and numbness at times.  The pain is worse with going from sitting to standing, sleeping on her sides, and getting in and out of the car.  Rest helps.  She reports not starting physical therapy as it is too expensive for her.  She would also like to discuss recent imaging results. She denies fever/night sweats, urinary incontinence, bowel incontinence, significant weight changes, significant motor weakness or changes, or loss of sensations.   Today's pain score in her lower back is 7/10.    Monica Knight presents to the clinic for the evaluation of neck and back pain. The back pain started years ago and the neck pain started in April 2023 following no inciting event and symptoms have been worsening.The pain is located in the lower back area and radiates to the right > left leg (left thigh only).  The neck pain radiates down both arms with tingling in both hands.  The pain is described as dull, numbing, tight band, and tingling and is rated as 6/10. The pain is rated with a score of  3/10 on the BEST day and a score of 6/10 on the WORST day.  The lower back sometimes gets to a 6 or 7, but the pain has been easing up. Symptoms interfere with daily activity. The lower back pain is exacerbated by Sitting, Standing, and Lifting.  The pain is mitigated by ice and injection. The patient reports 4-6 hours of uninterrupted sleep per night.    Patient denies urinary incontinence and bowel incontinence.  She reports her balance has been getting worse in the past few months.  She states that she relocated recently.  She recently saw Dr. Neff in [facility] who did an epidural  injection (Deceber 18th, 2023).  She states that the pain improved, but not for a long time.  She had a neck injection in July 2023.  She was with [facility] initially (Orthopedics and Pain management), however she had to switch to [facility] since her insurance changed.    Physical Therapy/Home Exercise: June 2023 (had PT for lower back).  She does some leg lifts that she learned from PT.  She also tries to walk at home.      Pain Medications:   - tramadol   - ibuprofen   - tylenol (combined with ibuprofen)   - hydrocodone     report:  Reviewed and consistent with medication use as prescribed.  12/15/2023 11/16/2023 1 Hydrocodone-Acetamin 5-325 Mg 30.00 7 Sherron Hub     Pain Procedures:   Lumbar DARNELL December 2023  Cervical DARNELL July 2023 2/22/2024 - C7/T1 IL DARNELL - 85% relief  3/28/2024 - bilateral SI and GTB - 75% relief    Imaging:   XR HIPS BILATERAL 2 VIEW INCL AP PELVIS 3/28/24  CLINICAL HISTORY:  Trochanteric bursitis, right hip  TECHNIQUE:  AP view of the pelvis and frogleg lateral views of both hips were performed.  COMPARISON:  None.  FINDINGS:  No fracture or dislocation.  Hip joint spaces are preserved.  Degenerative changes in the lower lumbar spine.  No soft tissue swelling or calcifications.\  Impression:  As above.  Electronically signed by:Favio Priest  Date:                                            03/08/2024  Time:                                           13:20    XR LUMBAR SPINE 5 VIEW WITH FLEX AND EXT  CLINICAL HISTORY:  Spondylolisthesis, lumbar region  TECHNIQUE:  AP, lateral, and oblique images are performed through the lumbar spine.  COMPARISON:  None  FINDINGS:  Lumbar vertebral body heights are maintained.    Disc space narrowing and endplate changes L5-S1.  Facet arthropathy lower lumbar spine.  Grade 1 anterolisthesis L3 on L4 and L4 on L5 with no significant change with flexion or extension.  Impression:  Degenerative change lower lumbar spine as above.     Electronically signed by: Isela  Vitter  Date:                                            01/17/2024  Time:                                           14:39    XR CERVICAL SPINE 5 VIEW WITH FLEX AND EXT  CLINICAL HISTORY:  Spinal stenosis, cervical region  TECHNIQUE:  Five views of the cervical spine plus flexion and extension views were performed.  COMPARISON:  None.  FINDINGS:  Vertebral body heights are maintained.  Disc space narrowing and endplate changes C5-6 and C6-7. oblique views show no significant bony narrowing of the neural foramina.  Straightening of the normal cervical lordosis.  Slight grade 1 retrolisthesis C5-6 with no significant change with flexion or extension  No significant prevertebral soft tissue edema.  Impression:  Degenerative change C5-6 and C6-7.    Electronically signed by: Isela Jackson  Date:                                            01/17/2024  Time:                                           14:38    MRI Lumbar 12/11/23:  IMPRESSION:  Chronic L4-5 spondylolisthesis with new, 3mm anterior offset of L3 relative to L4.  L3-4 broad-based right foraminal 2.6mm disc herniation with superimposed annular bulging and posterior element hypertrophy.  There is moderate canal and severe right greater than left foraminal stenosis.  L4-5 diffuse disc bulge with posterior element hypertrophy.  There is severe canal and right greater than left foraminal stenosis. Facet hypertrophy and left facet effusion identified.  L5-S1 uncovering and bulging of the disc with caudal migration identified. Facet hypertrophy is evident, the left foramen is mildly narrowed.    Signature  Electronically signed: Jared Fernandez MD on 12-, 01:08PM      MRI Cervical 3/17/2023  IMPRESSION:  Multilevel, multifactorial cervical degenerative changes as detailed above.  The most notable levels are C5-6 and C6-7 where there are advanced spinal canal and foraminal stenosis. There is flattening of the spinal cord but no abnormal cord signal.    Signed:  Harman Palomo MD    Pain Disability Index Review:      7/24/2024     2:23 PM 4/17/2024     1:48 PM 3/8/2024    10:55 AM   Last 3 PDI Scores   Pain Disability Index (PDI) 35 25 7       Allergies:   Review of patient's allergies indicates:   Allergen Reactions    Peanut Swelling     Throat swells and gets hives  Throat swells and gets hives  Throat swells and gets hives         Current Medications:   Current Outpatient Medications   Medication Sig Dispense Refill    blood sugar diagnostic (TRUE METRIX GLUCOSE TEST STRIP) Strp Use as directed to check blood sugar once daily. 100 strip 2    blood-glucose meter (TRUE METRIX AIR GLUCOSE METER) Misc Use as directed to check blood sugar once daily. 1 each 0    cetirizine (ZYRTEC) 10 MG tablet Take 1 tablet (10 mg total) by mouth once daily. 90 tablet 1    glipiZIDE (GLUCOTROL) 5 MG tablet Take 1 tablet (5 mg total) by mouth daily with breakfast. 30 tablet 11    ibuprofen (ADVIL,MOTRIN) 800 MG tablet Take 800 mg by mouth 2 (two) times daily.      lancets (TRUEPLUS LANCETS) 33 gauge Misc Use as directed to check blood sugar once daily. 100 each 2    metFORMIN (GLUCOPHAGE) 1000 MG tablet Take 1 tablet (1,000 mg total) by mouth 2 (two) times daily with meals. 180 tablet 1    methocarbamoL (ROBAXIN) 750 MG Tab Take 1 tablet (750 mg total) by mouth 3 (three) times daily as needed (muscular pain). 90 tablet 2    rosuvastatin (CRESTOR) 20 MG tablet Take 1 tablet (20 mg total) by mouth once daily. 90 tablet 3    tirzepatide 10 mg/0.5 mL PnIj Inject 10 mg into the skin every 7 days. 12 Pen 3    tirzepatide 10 mg/0.5 mL PnIj Inject 10 mg into the skin every 7 days. 4 Pen 0    tirzepatide 7.5 mg/0.5 mL PnIj Inject 7.5 mg into the skin every 7 days. 12 Pen 1    HYDROcodone-acetaminophen (NORCO) 5-325 mg per tablet Take 1 tablet by mouth every 6 (six) hours as needed for Pain.      LIDOcaine (LIDODERM) 5 % Place 1 patch onto the skin once daily. Remove & Discard patch within 12  hours or as directed by MD 30 patch 2    lisinopriL-hydrochlorothiazide (PRINZIDE,ZESTORETIC) 20-12.5 mg per tablet lisinopril 20 mg-hydrochlorothiazide 12.5 mg tablet 90 tablet 3     No current facility-administered medications for this visit.       REVIEW OF SYSTEMS:    GENERAL:  No new weight loss, malaise or fevers.  HEENT:  Negative for new frequent or significant headaches.  NECK:  Negative for new lumps, goiter, and significant neck swelling.  RESPIRATORY:  Negative for new cough, wheezing or shortness of breath.  CARDIOVASCULAR:  Negative for new chest pain, leg swelling or palpitations.  GI:  Negative for new abdominal discomfort, blood in stools or black stools or change in bowel habits.  MUSCULOSKELETAL:  See HPI.  SKIN:  Negative for new lesions, rash, and itching.  PSYCH:  Negative for new sleep disturbance, mood disorder  HEMATOLOGY/LYMPHOLOGY:  Negative for new prolonged bleeding, bruising easily or swollen nodes.  NEURO:   No new headaches, syncope, paralysis, seizures or tremors. Numbness to bilateral hands, new difficulty with balance   All other reviewed and negative other than HPI.    Past Medical History:  Past Medical History:   Diagnosis Date    Asthma     Diabetes mellitus        Past Surgical History:  Past Surgical History:   Procedure Laterality Date    EPIDURAL STEROID INJECTION N/A 2/22/2024    Procedure: CERVICAL C7/T1 DARNELL;  Surgeon: Roxann Randle MD;  Location: [facility];  Service: Pain Management;  Laterality: N/A;  [phone]    INJECTION OF JOINT Bilateral 3/28/2024    Procedure: INJECTION, JOINT BILATERAL SI AND BILATERAL GTB;  Surgeon: Roxann Randle MD;  Location: [facility];  Service: Pain Management;  Laterality: Bilateral;  [phone]  2 WK F/U SHIRA  *SCHEDULE AFTER 3/21*       Family History:  No family history on file.    Social History:  Social History     Socioeconomic History    Marital status: Single   Tobacco Use    Smoking status: Never    Smokeless tobacco:  "Never   Substance and Sexual Activity    Alcohol use: Not Currently    Drug use: Never    Sexual activity: Yes       OBJECTIVE:    /66   Pulse 82   Temp 98 °F (36.7 °C)   Resp 18   Ht 5' 3" (1.6 m)   Wt 83.2 kg (183 lb 6.8 oz)   SpO2 100%   BMI 32.49 kg/m²     PHYSICAL EXAMINATION:  General appearance: Well appearing, in no acute distress, alert and appropriately communicative.  Psych:  Mood and affect appropriate.  Skin: Skin color, texture, turgor normal, no rashes or lesions, in both upper and lower body for exposed skin.  Head/face:  Atraumatic, normocephalic.  Neck: Pain with neck flexion, extension, and lateral flexion  Cor: regular rate  Pulm: non-labored breathing  GI: Abdomen non-distended and non-tender.  Back: Straight leg raising in the sitting and supine positions is positive for radicular pain bilaterally. Pain to palpation over paraspinal muscles bilaterally. ROM limited by pain.  Extremities: No deformities, significant lymphedema, or skin discoloration. No significant open wounds. No major amputations.   Musculoskeletal: Hips are tender to palpation bilaterally, joseph test reproduces pain. Bilateral upper and lower extremity strength is normal and symmetric.  No atrophy or tone abnormalities are noted.  Neuro: Bilateral upper and lower extremity coordination and muscle stretch reflexes abnormalities noted: none.  Bolanos and/or Clonus: negative; loss of sensation to light touch: none  Gait: normal    CMP  Sodium   Date Value Ref Range Status   05/16/2024 139 136 - 145 mmol/L Final     Potassium   Date Value Ref Range Status   05/16/2024 4.4 3.5 - 5.1 mmol/L Final     Chloride   Date Value Ref Range Status   05/16/2024 104 95 - 110 mmol/L Final     CO2   Date Value Ref Range Status   05/16/2024 27 23 - 29 mmol/L Final     Glucose   Date Value Ref Range Status   05/16/2024 236 (H) 70 - 110 mg/dL Final     BUN   Date Value Ref Range Status   05/16/2024 9 8 - 23 mg/dL Final     Creatinine "   Date Value Ref Range Status   05/16/2024 0.9 0.5 - 1.4 mg/dL Final     Calcium   Date Value Ref Range Status   05/16/2024 9.6 8.7 - 10.5 mg/dL Final     Total Protein   Date Value Ref Range Status   05/16/2024 7.4 6.0 - 8.4 g/dL Final     Albumin   Date Value Ref Range Status   05/16/2024 3.8 3.5 - 5.2 g/dL Final     Total Bilirubin   Date Value Ref Range Status   05/16/2024 0.3 0.1 - 1.0 mg/dL Final     Comment:     For infants and newborns, interpretation of results should be based  on gestational age, weight and in agreement with clinical  observations.    Premature Infant recommended reference ranges:  Up to 24 hours.............<8.0 mg/dL  Up to 48 hours............<12.0 mg/dL  3-5 days..................<15.0 mg/dL  6-29 days.................<15.0 mg/dL       Alkaline Phosphatase   Date Value Ref Range Status   05/16/2024 74 55 - 135 U/L Final     AST   Date Value Ref Range Status   05/16/2024 16 10 - 40 U/L Final     ALT   Date Value Ref Range Status   05/16/2024 11 10 - 44 U/L Final     Anion Gap   Date Value Ref Range Status   05/16/2024 8 8 - 16 mmol/L Final     eGFR   Date Value Ref Range Status   05/16/2024 >60.0 >60 mL/min/1.73 m^2 Final       ASSESSMENT: 64 y.o. year old female with chronic neck pain, lower back pain and new numbness to bilateral hands, R>L, and new difficulty with balance.      1. Lumbar radiculopathy  Procedure Order to Pain Management    CANCELED: Procedure Order to Pain Management      2. Sacroiliitis  LIDOcaine (LIDODERM) 5 %      3. Cervical radiculopathy  Ambulatory referral/consult to Neurosurgery          IMPRESSION: oMnica Knight presents today for chronic lower back pain. History and physical exam are consistent with cervical and lumbar radiculopathy. MRI from 1/17/24 done at an outside facility ([facility] on [facility]) reviewed independently. Imaging is consistent with cervical stenosis at multiple levels with central and foraminal stenosis at multiple levels which is  worst at C6/7 and lumbar degenerative disc disease with spondylolisthesis, central narrowing, and foraminal narrowing at multiple levels, of which the worst is at L4/5 bilaterally.  She has done well in the past with her cervical epidural injection and bilateral SI/GTB injections, however she is now having issues with balance and persistent cervical radiculopathy.  We will send her for evaluation regarding her cervical radiculopathy and new balance issues, prior to considering repeat cervical epidural injections.  Regarding her lumbar radiculopathy, she may benefit from lumbar epidural injections.    PLAN:   - Provided with prescription for lidoderm patches   - Referral to neurosurgery. Patient saw Dr. Cole in 2021. Given new upper extremity symptoms and difficulty with balance, recommended to patient that she see Dr. Cole to discuss possible surgical intervention.   - MRI lumbar spine without contrast ordered   - Schedule Bilateral L4/L5 TFESI with follow up 2 weeks after   - I have stressed the importance of physical activity and a home exercise plan to help with pain and improve health.  - Patient can continue with medications for now since they are providing benefits, using them appropriately, and without side effects.  - RTC after epidural injection  - Counseled patient regarding the importance of activity modification and physical therapy.    The above plan and management options were discussed at length with patient. Patient is in agreement with the above and verbalized understanding.    Mariajose Wu  07/24/2024     I have personally reviewed the history and exam of this patient and agree with the student/resident/fellow/NPs note as stated above.  I have seen the patient and personally examined them as appropriate.  I have personally discussed the plan of care with the provider and patient and have reviewed and/or edited the plan of care as appropriate.  All questions have been answered to the best of  my ability.    Roxann Randle MD PhD

## 2024-07-25 DIAGNOSIS — M54.16 LUMBAR RADICULOPATHY: Primary | ICD-10-CM

## 2024-07-26 ENCOUNTER — PATIENT MESSAGE (OUTPATIENT)
Dept: PAIN MEDICINE | Facility: OTHER | Age: 64
End: 2024-07-26
Payer: MEDICARE

## 2024-07-29 ENCOUNTER — PATIENT MESSAGE (OUTPATIENT)
Dept: PAIN MEDICINE | Facility: OTHER | Age: 64
End: 2024-07-29
Payer: MEDICARE

## 2024-08-02 ENCOUNTER — OFFICE VISIT (OUTPATIENT)
Dept: INTERNAL MEDICINE | Facility: CLINIC | Age: 64
End: 2024-08-02
Payer: MEDICARE

## 2024-08-02 ENCOUNTER — LAB VISIT (OUTPATIENT)
Dept: LAB | Facility: HOSPITAL | Age: 64
End: 2024-08-02
Attending: INTERNAL MEDICINE
Payer: MEDICARE

## 2024-08-02 DIAGNOSIS — R10.9 ABDOMINAL PAIN, UNSPECIFIED ABDOMINAL LOCATION: ICD-10-CM

## 2024-08-02 DIAGNOSIS — Z78.0 ASYMPTOMATIC MENOPAUSAL STATE: Primary | ICD-10-CM

## 2024-08-02 DIAGNOSIS — I10 HYPERTENSION, UNSPECIFIED TYPE: ICD-10-CM

## 2024-08-02 DIAGNOSIS — E11.9 TYPE 2 DIABETES MELLITUS WITHOUT COMPLICATION, WITHOUT LONG-TERM CURRENT USE OF INSULIN: ICD-10-CM

## 2024-08-02 LAB
ALBUMIN SERPL BCP-MCNC: 4 G/DL (ref 3.5–5.2)
ALP SERPL-CCNC: 66 U/L (ref 55–135)
ALT SERPL W/O P-5'-P-CCNC: 12 U/L (ref 10–44)
ANION GAP SERPL CALC-SCNC: 7 MMOL/L (ref 8–16)
AST SERPL-CCNC: 18 U/L (ref 10–40)
BILIRUB SERPL-MCNC: 0.5 MG/DL (ref 0.1–1)
BUN SERPL-MCNC: 12 MG/DL (ref 8–23)
CALCIUM SERPL-MCNC: 9.9 MG/DL (ref 8.7–10.5)
CHLORIDE SERPL-SCNC: 108 MMOL/L (ref 95–110)
CO2 SERPL-SCNC: 27 MMOL/L (ref 23–29)
CREAT SERPL-MCNC: 0.9 MG/DL (ref 0.5–1.4)
EST. GFR  (NO RACE VARIABLE): >60 ML/MIN/1.73 M^2
ESTIMATED AVG GLUCOSE: 189 MG/DL (ref 68–131)
GLUCOSE SERPL-MCNC: 164 MG/DL (ref 70–110)
HBA1C MFR BLD: 8.2 % (ref 4–5.6)
POTASSIUM SERPL-SCNC: 4.4 MMOL/L (ref 3.5–5.1)
PROT SERPL-MCNC: 7.4 G/DL (ref 6–8.4)
SODIUM SERPL-SCNC: 142 MMOL/L (ref 136–145)

## 2024-08-02 PROCEDURE — 83036 HEMOGLOBIN GLYCOSYLATED A1C: CPT | Mod: HCNC | Performed by: INTERNAL MEDICINE

## 2024-08-02 PROCEDURE — 1101F PT FALLS ASSESS-DOCD LE1/YR: CPT | Mod: HCNC,CPTII,S$GLB, | Performed by: INTERNAL MEDICINE

## 2024-08-02 PROCEDURE — 1159F MED LIST DOCD IN RCRD: CPT | Mod: HCNC,CPTII,S$GLB, | Performed by: INTERNAL MEDICINE

## 2024-08-02 PROCEDURE — 3075F SYST BP GE 130 - 139MM HG: CPT | Mod: HCNC,CPTII,S$GLB, | Performed by: INTERNAL MEDICINE

## 2024-08-02 PROCEDURE — 3061F NEG MICROALBUMINURIA REV: CPT | Mod: HCNC,CPTII,S$GLB, | Performed by: INTERNAL MEDICINE

## 2024-08-02 PROCEDURE — 3052F HG A1C>EQUAL 8.0%<EQUAL 9.0%: CPT | Mod: HCNC,CPTII,S$GLB, | Performed by: INTERNAL MEDICINE

## 2024-08-02 PROCEDURE — 3078F DIAST BP <80 MM HG: CPT | Mod: HCNC,CPTII,S$GLB, | Performed by: INTERNAL MEDICINE

## 2024-08-02 PROCEDURE — 99214 OFFICE O/P EST MOD 30 MIN: CPT | Mod: HCNC,S$GLB,, | Performed by: INTERNAL MEDICINE

## 2024-08-02 PROCEDURE — 3008F BODY MASS INDEX DOCD: CPT | Mod: HCNC,CPTII,S$GLB, | Performed by: INTERNAL MEDICINE

## 2024-08-02 PROCEDURE — G2211 COMPLEX E/M VISIT ADD ON: HCPCS | Mod: HCNC,S$GLB,, | Performed by: INTERNAL MEDICINE

## 2024-08-02 PROCEDURE — 80053 COMPREHEN METABOLIC PANEL: CPT | Mod: HCNC | Performed by: INTERNAL MEDICINE

## 2024-08-02 PROCEDURE — 3288F FALL RISK ASSESSMENT DOCD: CPT | Mod: HCNC,CPTII,S$GLB, | Performed by: INTERNAL MEDICINE

## 2024-08-02 PROCEDURE — 3066F NEPHROPATHY DOC TX: CPT | Mod: HCNC,CPTII,S$GLB, | Performed by: INTERNAL MEDICINE

## 2024-08-02 PROCEDURE — 99999 PR PBB SHADOW E&M-EST. PATIENT-LVL V: CPT | Mod: PBBFAC,HCNC,, | Performed by: INTERNAL MEDICINE

## 2024-08-02 PROCEDURE — 36415 COLL VENOUS BLD VENIPUNCTURE: CPT | Mod: HCNC | Performed by: INTERNAL MEDICINE

## 2024-08-02 RX ORDER — ALBUTEROL SULFATE 90 UG/1
2 INHALANT RESPIRATORY (INHALATION) EVERY 6 HOURS PRN
Qty: 18 G | Refills: 4 | Status: SHIPPED | OUTPATIENT
Start: 2024-08-02

## 2024-08-03 ENCOUNTER — TELEPHONE (OUTPATIENT)
Dept: INTERNAL MEDICINE | Facility: CLINIC | Age: 64
End: 2024-08-03
Payer: MEDICARE

## 2024-08-03 NOTE — TELEPHONE ENCOUNTER
Please contact patient and inform her that her A1c is elevated.  I would like to increase the dose of her glipizide.  Please ask her what pharmacy she would like that sent to

## 2024-08-08 VITALS
DIASTOLIC BLOOD PRESSURE: 76 MMHG | HEIGHT: 63 IN | WEIGHT: 184.94 LBS | SYSTOLIC BLOOD PRESSURE: 138 MMHG | BODY MASS INDEX: 32.77 KG/M2 | TEMPERATURE: 99 F | HEART RATE: 73 BPM | OXYGEN SATURATION: 96 %

## 2024-08-13 ENCOUNTER — TELEPHONE (OUTPATIENT)
Dept: INTERNAL MEDICINE | Facility: CLINIC | Age: 64
End: 2024-08-13
Payer: MEDICARE

## 2024-08-13 NOTE — TELEPHONE ENCOUNTER
Returned call to pt and asked pt for the pharmacy info to send the new glipizide dosage. Pt said she does not want to increase the Glipizide at this time because she finally has all of her medications (after searching for mounjaro) and understands take the glipizide w/ her largest meal which will be lunch.  She would like to take the medication consistently and document.    She confirmed she will take the metformin at breakfast and dinner.  She stated the steroid injections she receives for her back increase the glucose levels.  I asked her to start a glucose log ( today thru next month noting twice a day glucose values, meals and the days she gets the injections so PCP and Endocrinology staff can review and decide med adjustments.  Pt agreed.

## 2024-08-14 ENCOUNTER — TELEPHONE (OUTPATIENT)
Dept: INTERNAL MEDICINE | Facility: CLINIC | Age: 64
End: 2024-08-14
Payer: MEDICARE

## 2024-08-14 NOTE — TELEPHONE ENCOUNTER
----- Message from Romeo Nagy sent at 8/13/2024  3:33 PM CDT -----  Contact: Pt  [phone]  Pt called in regards to speaking with staff or Dr Cantu about her A1C results and the medications pt did get the portal message but would like to speak with someone please advise.

## 2024-08-20 ENCOUNTER — PATIENT MESSAGE (OUTPATIENT)
Dept: PAIN MEDICINE | Facility: OTHER | Age: 64
End: 2024-08-20
Payer: MEDICARE

## 2024-08-22 ENCOUNTER — HOSPITAL ENCOUNTER (OUTPATIENT)
Facility: OTHER | Age: 64
Discharge: HOME | End: 2024-08-22
Attending: STUDENT IN AN ORGANIZED HEALTH CARE EDUCATION/TRAINING PROGRAM | Admitting: STUDENT IN AN ORGANIZED HEALTH CARE EDUCATION/TRAINING PROGRAM
Payer: MEDICARE

## 2024-08-22 VITALS
HEART RATE: 80 BPM | DIASTOLIC BLOOD PRESSURE: 63 MMHG | WEIGHT: 180 LBS | OXYGEN SATURATION: 99 % | SYSTOLIC BLOOD PRESSURE: 113 MMHG | BODY MASS INDEX: 31.89 KG/M2 | RESPIRATION RATE: 18 BRPM | TEMPERATURE: 98 F | HEIGHT: 63 IN

## 2024-08-22 DIAGNOSIS — G89.29 CHRONIC PAIN: ICD-10-CM

## 2024-08-22 DIAGNOSIS — M54.16 LUMBAR RADICULOPATHY: Primary | ICD-10-CM

## 2024-08-22 LAB — POCT GLUCOSE: 116 MG/DL (ref 70–110)

## 2024-08-22 PROCEDURE — 64483 NJX AA&/STRD TFRM EPI L/S 1: CPT | Mod: 50,HCNC | Performed by: STUDENT IN AN ORGANIZED HEALTH CARE EDUCATION/TRAINING PROGRAM

## 2024-08-22 PROCEDURE — 64483 NJX AA&/STRD TFRM EPI L/S 1: CPT | Mod: 50,HCNC,, | Performed by: STUDENT IN AN ORGANIZED HEALTH CARE EDUCATION/TRAINING PROGRAM

## 2024-08-22 PROCEDURE — 63600175 PHARM REV CODE 636 W HCPCS: Mod: HCNC | Performed by: STUDENT IN AN ORGANIZED HEALTH CARE EDUCATION/TRAINING PROGRAM

## 2024-08-22 PROCEDURE — 25000003 PHARM REV CODE 250: Mod: HCNC | Performed by: STUDENT IN AN ORGANIZED HEALTH CARE EDUCATION/TRAINING PROGRAM

## 2024-08-22 PROCEDURE — 99152 MOD SED SAME PHYS/QHP 5/>YRS: CPT | Mod: HCNC | Performed by: STUDENT IN AN ORGANIZED HEALTH CARE EDUCATION/TRAINING PROGRAM

## 2024-08-22 PROCEDURE — 25500020 PHARM REV CODE 255: Mod: HCNC | Performed by: STUDENT IN AN ORGANIZED HEALTH CARE EDUCATION/TRAINING PROGRAM

## 2024-08-22 RX ORDER — FENTANYL CITRATE 50 UG/ML
INJECTION, SOLUTION INTRAMUSCULAR; INTRAVENOUS
Status: DISCONTINUED | OUTPATIENT
Start: 2024-08-22 | End: 2024-08-22 | Stop reason: HOSPADM

## 2024-08-22 RX ORDER — LIDOCAINE HYDROCHLORIDE 10 MG/ML
INJECTION, SOLUTION EPIDURAL; INFILTRATION; INTRACAUDAL; PERINEURAL
Status: DISCONTINUED | OUTPATIENT
Start: 2024-08-22 | End: 2024-08-22 | Stop reason: HOSPADM

## 2024-08-22 RX ORDER — SODIUM CHLORIDE 9 MG/ML
INJECTION, SOLUTION INTRAVENOUS CONTINUOUS
Status: DISCONTINUED | OUTPATIENT
Start: 2024-08-22 | End: 2024-08-22 | Stop reason: HOSPADM

## 2024-08-22 RX ORDER — LIDOCAINE HYDROCHLORIDE 20 MG/ML
INJECTION, SOLUTION INFILTRATION; PERINEURAL
Status: DISCONTINUED | OUTPATIENT
Start: 2024-08-22 | End: 2024-08-22 | Stop reason: HOSPADM

## 2024-08-22 RX ORDER — DEXAMETHASONE SODIUM PHOSPHATE 10 MG/ML
INJECTION INTRAMUSCULAR; INTRAVENOUS
Status: DISCONTINUED | OUTPATIENT
Start: 2024-08-22 | End: 2024-08-22 | Stop reason: HOSPADM

## 2024-08-22 RX ORDER — MIDAZOLAM HYDROCHLORIDE 1 MG/ML
INJECTION INTRAMUSCULAR; INTRAVENOUS
Status: DISCONTINUED | OUTPATIENT
Start: 2024-08-22 | End: 2024-08-22 | Stop reason: HOSPADM

## 2024-08-22 NOTE — DISCHARGE SUMMARY
Discharge Note  Short Stay      SUMMARY     Admit Date: 8/22/2024    Attending Physician: Roxann Randle MD PhD    Discharge Physician: Roxann Randle      Discharge Date: 8/22/2024 10:48 AM    Procedure(s) (LRB):  LUMBAR TRANSFORAMINAL BILATERAL L4/5 (Bilateral)    Final Diagnosis: Lumbar radiculopathy [M54.16]    Disposition: Home or self care    Patient Instructions:   Current Discharge Medication List        CONTINUE these medications which have NOT CHANGED    Details   albuterol (PROVENTIL/VENTOLIN HFA) 90 mcg/actuation inhaler Inhale 2 puffs into the lungs every 6 (six) hours as needed for Wheezing. Rescue  Qty: 18 g, Refills: 4      blood sugar diagnostic (TRUE METRIX GLUCOSE TEST STRIP) Strp Use as directed to check blood sugar once daily.  Qty: 100 strip, Refills: 2      blood-glucose meter (TRUE METRIX AIR GLUCOSE METER) Misc Use as directed to check blood sugar once daily.  Qty: 1 each, Refills: 0      cetirizine (ZYRTEC) 10 MG tablet Take 1 tablet (10 mg total) by mouth once daily.  Qty: 90 tablet, Refills: 1    Associated Diagnoses: Postnasal drip      glipiZIDE (GLUCOTROL) 5 MG tablet Take 1 tablet (5 mg total) by mouth daily with breakfast.  Qty: 30 tablet, Refills: 11      HYDROcodone-acetaminophen (NORCO) 5-325 mg per tablet Take 1 tablet by mouth every 6 (six) hours as needed for Pain.      ibuprofen (ADVIL,MOTRIN) 800 MG tablet Take 800 mg by mouth 2 (two) times daily.      lancets (TRUEPLUS LANCETS) 33 gauge Misc Use as directed to check blood sugar once daily.  Qty: 100 each, Refills: 2      LIDOcaine (LIDODERM) 5 % Place 1 patch onto the skin once daily. Remove & Discard patch within 12 hours or as directed by MD  Qty: 30 patch, Refills: 2    Associated Diagnoses: Sacroiliitis      lisinopriL-hydrochlorothiazide (PRINZIDE,ZESTORETIC) 20-12.5 mg per tablet lisinopril 20 mg-hydrochlorothiazide 12.5 mg tablet  Qty: 90 tablet, Refills: 3    Comments: .      metFORMIN (GLUCOPHAGE) 1000 MG tablet Take 1  tablet (1,000 mg total) by mouth 2 (two) times daily with meals.  Qty: 180 tablet, Refills: 1      methocarbamoL (ROBAXIN) 750 MG Tab Take 1 tablet (750 mg total) by mouth 3 (three) times daily as needed (muscular pain).  Qty: 90 tablet, Refills: 2    Associated Diagnoses: Myofascial pain syndrome      rosuvastatin (CRESTOR) 20 MG tablet Take 1 tablet (20 mg total) by mouth once daily.  Qty: 90 tablet, Refills: 3    Associated Diagnoses: Mixed hyperlipidemia      tirzepatide 10 mg/0.5 mL PnIj Inject 10 mg into the skin every 7 days.  Qty: 12 Pen, Refills: 3    Associated Diagnoses: Type 2 diabetes mellitus without complication, without long-term current use of insulin      tirzepatide 7.5 mg/0.5 mL PnIj Inject 7.5 mg into the skin every 7 days.  Qty: 12 Pen, Refills: 1                 Discharge Diagnosis: Lumbar radiculopathy [M54.16]  Condition on Discharge: Stable with no complications to procedure   Diet on Discharge: Same as before.  Activity: as per instruction sheet.  Discharge to: Home with a responsible adult.  Follow up: 2-4 weeks       Please call my office or pager at [phone] if experienced any weakness or loss of sensation, fever > 101.5, pain uncontrolled with oral medications, persistent nausea/vomiting/or diarrhea, redness or drainage from the incisions, or any other worrisome concerns. If physician on call was not reached or could not communicate with our office for any reason please go to the nearest emergency department      Roxann Randle MD PhD

## 2024-08-22 NOTE — OP NOTE
Lumbar Transforaminal Epidural Steroid Injection under Fluoroscopic Guidance    The procedure, risks, benefits, and options were discussed with the patient. There are no contraindications to the procedure. The patent expressed understanding and agreed to the procedure. Informed written consent was obtained prior to the start of the procedure and can be found in the patient's chart.    PATIENT NAME: Monica Knight   MRN: [id number]     DATE OF PROCEDURE: 08/22/2024    PROCEDURE:  Bilateral  L4/5 Lumbar Transforaminal Epidural Steroid Injection under Fluoroscopic Guidance    PRE-OP DIAGNOSIS: Lumbar radiculopathy [M54.16] Lumbar radiculopathy [M54.16]    POST-OP DIAGNOSIS: Same    PHYSICIAN: Roxann Randle MD    ASSISTANTS: None     MEDICATIONS INJECTED: Preservative-free Decadron 10mg with 5cc of Lidocaine 1% MPF     LOCAL ANESTHETIC INJECTED: Xylocaine 2%     SEDATION: Versed 2mg and Fentanyl 50mcg                                                                                                                                                                                     Conscious sedation ordered by M.D. Patient re-evaluation prior to administration of conscious sedation. No changes noted in patient's status from initial evaluation. The patient's vital signs were monitored by RN and patient remained hemodynamically stable throughout the procedure.    Event Time In   Sedation Start 1055   Sedation End 1107       ESTIMATED BLOOD LOSS: None    COMPLICATIONS: None    TECHNIQUE: Time-out was performed to identify the patient and procedure to be performed. With the patient laying in a prone position, the surgical area was prepped and draped in the usual sterile fashion using ChloraPrep and a fenestrated drape.The levels were determined under fluoroscopy guidance. Skin anesthesia was achieved by injecting Lidocaine 2% over the injection sites. The transforaminal spaces were then approached with a 22 gauge, 5 inch  spinal quinke needle that was introduced under fluoroscopic guidance in the AP and Lateral views. Once the needle tip was in the area of the transforaminal space, and there was no blood, CSF or paraesthesias, contrast dye Omnipaque (300mg/mL) was injected to confirm placement and there was no vascular runoff. Fluoroscopic imaging in the AP and lateral views revealed a clear outline of the spinal nerve with proximal spread of agent through the neural foramen into the epidural space. 3 mL of the medication mixture listed above was injected slowly at each site. Displacement of the radio opaque contrast after injection of the medication confirmed that the medication went into the area of the transforaminal spaces. The needles were removed and bleeding was nil. A sterile dressing was applied. No specimens collected. The patient tolerated the procedure well.     The patient was monitored after the procedure in the recovery area. They were given post-procedure and discharge instructions to follow at home. The patient was discharged in a stable condition.    Roxann Randle MD

## 2024-08-22 NOTE — DISCHARGE INSTRUCTIONS

## 2024-08-23 ENCOUNTER — TELEPHONE (OUTPATIENT)
Dept: PAIN MEDICINE | Facility: CLINIC | Age: 64
End: 2024-08-23
Payer: MEDICARE

## 2024-08-23 ENCOUNTER — OFFICE VISIT (OUTPATIENT)
Dept: GASTROENTEROLOGY | Facility: CLINIC | Age: 64
End: 2024-08-23
Payer: MEDICARE

## 2024-08-23 VITALS
WEIGHT: 181.44 LBS | HEART RATE: 92 BPM | HEIGHT: 63 IN | DIASTOLIC BLOOD PRESSURE: 60 MMHG | BODY MASS INDEX: 32.15 KG/M2 | SYSTOLIC BLOOD PRESSURE: 125 MMHG

## 2024-08-23 DIAGNOSIS — R12 HEARTBURN: ICD-10-CM

## 2024-08-23 DIAGNOSIS — R14.0 BLOATING: Primary | ICD-10-CM

## 2024-08-23 DIAGNOSIS — R10.11 BILATERAL UPPER ABDOMINAL DISCOMFORT: ICD-10-CM

## 2024-08-23 DIAGNOSIS — R10.12 BILATERAL UPPER ABDOMINAL DISCOMFORT: ICD-10-CM

## 2024-08-23 DIAGNOSIS — Z12.11 COLON CANCER SCREENING: ICD-10-CM

## 2024-08-23 DIAGNOSIS — R15.1 FECAL SMEARING: ICD-10-CM

## 2024-08-23 DIAGNOSIS — R14.0 FLATULENCE/GAS PAIN/BELCHING: ICD-10-CM

## 2024-08-23 PROCEDURE — 99999 PR PBB SHADOW E&M-EST. PATIENT-LVL V: CPT | Mod: PBBFAC,HCNC,,

## 2024-08-23 NOTE — PROGRESS NOTES
Subjective:       Patient ID: Monica Knight is a 64 y.o. female Body mass index is 32.14 kg/m².    Chief Complaint: Bloated    This patient is new to me.  Referring Provider: Dr. Shirley Cantu for bloating, fecal smearing.             Abdominal Pain  This is a chronic (reports abdominal discomfort for the past 9 month described as a bloating sensation) problem. The problem occurs every several days. The problem has been gradually worsening. The pain is located in the epigastric region (bloating is felt more in the epigastric/upper abdomen, states takes NSAIDS prn for pain). The pain is at a severity of 0/10 (denies any current discomfort). The quality of the pain is a sensation of fullness. The abdominal pain radiates to the LUQ and RUQ. Associated symptoms include belching (with belching reports a foul odor that has occured with abdominal bloating reports heartburn, and dysphagia with pill and fries no issues with liquids) and flatus (Reports excess gas and excess belching). Pertinent negatives include no anorexia, arthralgias, constipation, diarrhea, dysuria, fever, frequency, headaches, hematochezia, hematuria, melena, myalgias, nausea, vomiting or weight loss. Associated symptoms comments: Pt presents to clinic to discuss new onset of abdominal bloating discomfort and fecal incontinence states fecal incontinence started about 3 months ago in notices it more after she takes baking soda to help with the abdominal discomfort, reports she has a BM daily reports stools are normal in soft like type 4 on [place] stool scale denies any diarrhea, does not take anything for her bowels, denies any past history of constipation, patient states she has been on Mounjaro for a year now for diabetes and was increased to a high dose in July patient had a colonoscopy in [place] in 2022 and was told to repeated in 5 years denies any history of colon polyps denies any family history of colon cancer, has not had an EGD. The pain  is aggravated by eating (eating in general worsens discomfort). Relieved by: has tried tums, dulcolax, pepto, and baking soda with water and that helps the most out of things. She has tried antacids for the symptoms. Prior diagnostic workup includes GI consult. There is no history of abdominal surgery, colon cancer, Crohn's disease, gallstones, GERD, irritable bowel syndrome, pancreatitis, PUD or ulcerative colitis. Patient's medical history does not include kidney stones and UTI.       Review of Systems   Constitutional:  Negative for fever and weight loss.   Gastrointestinal:  Positive for abdominal pain and flatus (Reports excess gas and excess belching). Negative for abdominal distention, anal bleeding, anorexia, blood in stool, constipation, diarrhea, hematochezia, melena, nausea, rectal pain and vomiting.   Genitourinary:  Negative for dysuria, frequency and hematuria.   Musculoskeletal:  Negative for arthralgias and myalgias.   Neurological:  Negative for headaches.         No LMP recorded. Patient is postmenopausal.  Past Medical History:   Diagnosis Date    Asthma     Diabetes mellitus      Past Surgical History:   Procedure Laterality Date    COLONOSCOPY      2022 in [place]    EPIDURAL STEROID INJECTION N/A 02/22/2024    Procedure: CERVICAL C7/T1 DARNELL;  Surgeon: Roxann Randle MD;  Location: [facility], [place], operated by [facility];  Service: Pain Management;  Laterality: N/A;  [phone]    INJECTION OF JOINT Bilateral 03/28/2024    Procedure: INJECTION, JOINT BILATERAL SI AND BILATERAL GTB;  Surgeon: Roxann Randle MD;  Location: [facility];  Service: Pain Management;  Laterality: Bilateral;  [phone]  2 WK F/U SHIRA  *SCHEDULE AFTER 3/21*    TRANSFORAMINAL EPIDURAL INJECTION OF STEROID Bilateral 08/22/2024    Procedure: LUMBAR TRANSFORAMINAL BILATERAL L4/5;  Surgeon: Roxann Randle MD;  [facility];  Service: Pain Management;  Laterality: Bilateral;  [phone]  2 WK F/U EBEN     Family History   Problem Relation  Name Age of Onset    Colon cancer Neg Hx       Social History     Tobacco Use    Smoking status: Never    Smokeless tobacco: Never   Substance Use Topics    Alcohol use: Not Currently    Drug use: Never     Wt Readings from Last 10 Encounters:   08/23/24 82.3 kg (181 lb 7 oz)   08/22/24 81.6 kg (180 lb)   08/02/24 83.9 kg (184 lb 15.5 oz)   07/24/24 83.2 kg (183 lb 6.8 oz)   05/20/24 84.4 kg (186 lb 1.1 oz)   04/17/24 83.9 kg (184 lb 15.5 oz)   04/02/24 83.9 kg (184 lb 15.5 oz)   03/28/24 82.1 kg (181 lb)   03/08/24 85 kg (187 lb 6.3 oz)   02/22/24 83.9 kg (185 lb)     Lab Results   Component Value Date    WBC 4.77 01/10/2024    HGB 12.9 01/10/2024    HCT 39.2 01/10/2024    MCV 94 01/10/2024     01/10/2024     CMP  Sodium   Date Value Ref Range Status   08/02/2024 142 136 - 145 mmol/L Final     Potassium   Date Value Ref Range Status   08/02/2024 4.4 3.5 - 5.1 mmol/L Final     Chloride   Date Value Ref Range Status   08/02/2024 108 95 - 110 mmol/L Final     CO2   Date Value Ref Range Status   08/02/2024 27 23 - 29 mmol/L Final     Glucose   Date Value Ref Range Status   08/02/2024 164 (H) 70 - 110 mg/dL Final     BUN   Date Value Ref Range Status   08/02/2024 12 8 - 23 mg/dL Final     Creatinine   Date Value Ref Range Status   08/02/2024 0.9 0.5 - 1.4 mg/dL Final     Calcium   Date Value Ref Range Status   08/02/2024 9.9 8.7 - 10.5 mg/dL Final     Total Protein   Date Value Ref Range Status   08/02/2024 7.4 6.0 - 8.4 g/dL Final     Albumin   Date Value Ref Range Status   08/02/2024 4.0 3.5 - 5.2 g/dL Final     Total Bilirubin   Date Value Ref Range Status   08/02/2024 0.5 0.1 - 1.0 mg/dL Final     Comment:     For infants and newborns, interpretation of results should be based  on gestational age, weight and in agreement with clinical  observations.    Premature Infant recommended reference ranges:  Up to 24 hours.............<8.0 mg/dL  Up to 48 hours............<12.0 mg/dL  3-5 days..................<15.0  "mg/dL  6-29 days.................<15.0 mg/dL       Alkaline Phosphatase   Date Value Ref Range Status   08/02/2024 66 55 - 135 U/L Final     AST   Date Value Ref Range Status   08/02/2024 18 10 - 40 U/L Final     ALT   Date Value Ref Range Status   08/02/2024 12 10 - 44 U/L Final     Anion Gap   Date Value Ref Range Status   08/02/2024 7 (L) 8 - 16 mmol/L Final     eGFR if    Date Value Ref Range Status   10/25/2021 >60.0 >60 mL/min/1.73 m^2 Final     eGFR if non    Date Value Ref Range Status   10/25/2021 >60.0 >60 mL/min/1.73 m^2 Final     Comment:     Calculation used to obtain the estimated glomerular filtration  rate (eGFR) is the CKD-EPI equation.        No results found for: "LIPASE"  No results found for: "LIPASERES"  Lab Results   Component Value Date    TSH 0.845 01/10/2024       Reviewed prior medical records including office visit 08/02/2024 Dr. Cantu radiology report of & endoscopy history (see surgical history/procedures).    Objective:      Physical Exam  Vitals and nursing note reviewed.   Constitutional:       Appearance: Normal appearance. She is normal weight.   Cardiovascular:      Rate and Rhythm: Normal rate and regular rhythm.      Heart sounds: Normal heart sounds.   Pulmonary:      Breath sounds: Normal breath sounds.   Abdominal:      General: Bowel sounds are normal.      Palpations: Abdomen is soft.      Tenderness: There is no abdominal tenderness.   Skin:     General: Skin is warm and dry.      Coloration: Skin is not jaundiced.   Neurological:      Mental Status: She is alert and oriented to person, place, and time.   Psychiatric:         Mood and Affect: Mood normal.         Behavior: Behavior normal.         Assessment:       1. Bloating    2. Bilateral upper abdominal discomfort    3. Flatulence/gas pain/belching    4. Heartburn    5. Gastroesophageal reflux disease, unspecified whether esophagitis present    6. Fecal smearing    7. Colon cancer " screening        Plan:       Bloating  -     TISSUE TRANSGLUTAMINASE (TTG), IGA; Future; Expected date: 08/23/2024  -     IGA; Future; Expected date: 08/23/2024  -     X-Ray Abdomen AP 1 View; Future; Expected date: 08/23/2024  -     Case Request Endoscopy: EGD (ESOPHAGOGASTRODUODENOSCOPY)  - schedule EGD, discussed procedure with patient, including risks and benefits, patient verbalized understanding  -testing for H. Pylori typically performed during EGD  -educated pt on symptoms of mounjaro pt verbalized understanding  informed patient that GLP1 medication will likely need to be held for endoscopy, nurse will confirm with endoscopist, prescribing provider, and/or PCP.  -consider Sucraid testing    Bilateral upper abdominal discomfort  -     Lipase; Future; Expected date: 08/23/2024  -     Case Request Endoscopy: EGD (ESOPHAGOGASTRODUODENOSCOPY)  -recommended to start on PPI pt would like to wait till we complete EGD  -consider US of abdomen    Flatulence/gas pain/belching  -     X-Ray Abdomen AP 1 View; Future; Expected date: 08/23/2024   - schedule EGD, discussed procedure with patient, including risks and benefits, patient verbalized understanding   -EGD for excess belching   - recommend OTC simethicone as directed, such as Phazyme or Gas-x pt would like to hold off on OTC meds  - recommend low gas diet: Reduce or eliminate these foods from your diet: Broccoli, Cauliflower, [place] sprouts, Cabbage, Cooked dried beans, Carbonated beverages (sparkling water, soda, beer, champagne)  Other Causes Of Excess Gas Include:   1) EATING TOO FAST or TALKING WHILE YOU CHEW may cause you to swallow air. This increases the amount of gas in the stomach and may worsen your symptoms.  --> Chew each mouthful completely before swallowing. Take your time.  2) OVEREATING may increase the feeling of being bloated and cause more gas.  --> When you are full, stop eating.  3) CONSTIPATION can increase the amount of normal intestinal  gas.  --> Avoid constipation by increasing the amount of fiber in your diet by including whole cereal grains, fresh vegetables (except those in the above list) and fresh fruits. High-fiber foods absorb water and carry it out of the body. When increasing the amount of fiber in your diet, you also need to increase the amount of water that you drink. You should drink at least eight 8-ounce glasses of water (two quarts) per day.    Heartburn  -     Case Request Endoscopy: EGD (ESOPHAGOGASTRODUODENOSCOPY)  -Educated patient on lifestyle modifications to help control/reduce reflux/abdominal pain including: avoid large meals, avoid eating within 2-3 hours of bedtime (avoid late night eating & lying down soon after eating), elevate head of bed if nocturnal symptoms are present, smoking cessation (if current smoker), & weight loss (if overweight).   -Educated to avoid known foods which trigger reflux symptoms & to minimize/avoid high-fat foods, chocolate, caffeine, citrus, alcohol, & tomato products.  -Advised to avoid/limit use of NSAID's, since they can cause GI upset, bleeding, and/or ulcers. If needed, take with food.     Fecal smearing  -     X-Ray Abdomen AP 1 View; Future; Expected date: 08/23/2024  - recommend high fiber diet to help bulk up the stool, especially soluble fiber since this can help bulk up the stool consistency and may help to slow down how fast the stool goes through the colon and can prevent diarrhea  - possible trial of daily low dose loperamide, but cautioned about constipation  - possible referral to general surgery and/or pelvic floor physical therapy, if symptoms persist despite use of above recommendations    Colon cancer screening   Next surveillance colonoscopy per patient report in 5 years in 2027   -pt to provide copy of latest colonoscopy     Follow up if symptoms worsen or fail to improve.      If no improvement in symptoms or symptoms worsen, call/follow-up at clinic or go to ER.        [facility] - GASTROENTEROLOGY  [facility], [place]     Dictation software program was used for this note. Please expect some simple typographical  errors in this note.    Encounter includes face to face time and non-face to face time preparing to see the patient (eg, review of tests), obtaining and/or reviewing separately obtained history, documenting clinical information in the electronic or other health record, independently interpreting results (not separately reported) and communicating results to the patient/family/caregiver, or care coordination (not separately reported).

## 2024-08-23 NOTE — TELEPHONE ENCOUNTER
"----- Message from Mary Soloriolicha sent at 8/23/2024  2:31 PM CDT -----  Regarding: pt appt  Contact: [phone]  .Name Of Caller: Self     Contact Preference?:[phone]     What is the nature of the call?: pt calling in regards to appt 9/5/24. Pls call      Additional Notes:  "Thank you for all that you do for our patients"  "

## 2024-08-23 NOTE — PATIENT INSTRUCTIONS
..Instructions for Outpatient Endoscopy    PROCEDURE DATE: 10/25/24  REPORT TO [facility] REGISTRATION ([address] [place] [address]. [id number])   Your arrival time will be provided by the ENDO department about 1 week prior to your procedure date. If you do not hear from them, they can be reached at [phone] Mon- Friday 8a-2p.    NO BLOOD THINNERS/NO ASPIRIN (OK to continue Aspirin 81mg) OR MEDICATIONS CONTAINING ASPIRIN, MOTRIN, IBUPROFEN, ALEVE, OR ANY ANTI-INFLAMMATORY MEDICATIONS FOR 7 DAYS PRIOR TO PROCEDURE. TYLENOL IS OK.      Please hold the following diabetic/weight loss medications 1 week prior to procedure:   Dulaglutide (Trulicity)  Exenatide extended release (Bydureon bcise)  Semaglutide (Ozempic, Wegovy)  Tirzepatide (Mounjaro, Zepbound)  Please hold the following medications the day before your procedure:   Exenatide (Byetta)   Liraglutide (Victoza, Saxenda)   Lixisenatide (Adlyxin)   Semaglutide (Rybelsus)     Clear Liquid diet the whole day before your procedure.     Nothing by mouth after midnight or the morning of EGD.    Ok to take morning medications with small sip water by 5:30am (except no Diabetic medications)    SINCE SEDATION IS USED, YOU MUST HAVE SOMEONE TO DRIVE YOU HOME/NO TAXI  Please call [phone] with any questions.

## 2024-08-26 ENCOUNTER — OFFICE VISIT (OUTPATIENT)
Dept: NEUROSURGERY | Facility: CLINIC | Age: 64
End: 2024-08-26
Payer: MEDICARE

## 2024-08-26 VITALS — DIASTOLIC BLOOD PRESSURE: 76 MMHG | SYSTOLIC BLOOD PRESSURE: 126 MMHG | HEART RATE: 81 BPM

## 2024-08-26 DIAGNOSIS — M54.12 CERVICAL RADICULOPATHY: ICD-10-CM

## 2024-08-26 PROCEDURE — 3066F NEPHROPATHY DOC TX: CPT | Mod: HCNC,CPTII,S$GLB, | Performed by: STUDENT IN AN ORGANIZED HEALTH CARE EDUCATION/TRAINING PROGRAM

## 2024-08-26 PROCEDURE — 1159F MED LIST DOCD IN RCRD: CPT | Mod: HCNC,CPTII,S$GLB, | Performed by: STUDENT IN AN ORGANIZED HEALTH CARE EDUCATION/TRAINING PROGRAM

## 2024-08-26 PROCEDURE — 3288F FALL RISK ASSESSMENT DOCD: CPT | Mod: HCNC,CPTII,S$GLB, | Performed by: STUDENT IN AN ORGANIZED HEALTH CARE EDUCATION/TRAINING PROGRAM

## 2024-08-26 PROCEDURE — 1101F PT FALLS ASSESS-DOCD LE1/YR: CPT | Mod: HCNC,CPTII,S$GLB, | Performed by: STUDENT IN AN ORGANIZED HEALTH CARE EDUCATION/TRAINING PROGRAM

## 2024-08-26 PROCEDURE — 3074F SYST BP LT 130 MM HG: CPT | Mod: HCNC,CPTII,S$GLB, | Performed by: STUDENT IN AN ORGANIZED HEALTH CARE EDUCATION/TRAINING PROGRAM

## 2024-08-26 PROCEDURE — 3078F DIAST BP <80 MM HG: CPT | Mod: HCNC,CPTII,S$GLB, | Performed by: STUDENT IN AN ORGANIZED HEALTH CARE EDUCATION/TRAINING PROGRAM

## 2024-08-26 PROCEDURE — 99999 PR PBB SHADOW E&M-EST. PATIENT-LVL III: CPT | Mod: PBBFAC,HCNC,, | Performed by: STUDENT IN AN ORGANIZED HEALTH CARE EDUCATION/TRAINING PROGRAM

## 2024-08-26 PROCEDURE — 99215 OFFICE O/P EST HI 40 MIN: CPT | Mod: HCNC,S$GLB,, | Performed by: STUDENT IN AN ORGANIZED HEALTH CARE EDUCATION/TRAINING PROGRAM

## 2024-08-26 PROCEDURE — 3061F NEG MICROALBUMINURIA REV: CPT | Mod: HCNC,CPTII,S$GLB, | Performed by: STUDENT IN AN ORGANIZED HEALTH CARE EDUCATION/TRAINING PROGRAM

## 2024-08-26 PROCEDURE — 3052F HG A1C>EQUAL 8.0%<EQUAL 9.0%: CPT | Mod: HCNC,CPTII,S$GLB, | Performed by: STUDENT IN AN ORGANIZED HEALTH CARE EDUCATION/TRAINING PROGRAM

## 2024-08-26 NOTE — PROGRESS NOTES
Neurosurgery  Established Patient    SUBJECTIVE:     History of Present Illness:  61 F with long standing back and RLE radicular pain presents to establish care from [place]. Pt endorses back and RLE radicular pain. RLE pain is worse than her back pain. It radiates down back of leg into top of foot and sometimes into sole of foot. She has some left buttock pain but not as bad as right leg pain. Standing and walking makes the pain worse in back and leg. It is relieved when she flexes forward. She has had no prior spine surgery. She hasn't tried PT recently and had DARNELL in august with mild relief of pain.     Interval fu 8/26/24:  Pt has been seeing pain mgmt for cervical and lumbar injections.  She had a recent L4/5 TFESI bilateral which has provided her relief of her pain.  Today she endorses worsened bilateral hand numbness primarily in the first 3 digits which is worse at night.  Shaking out her hands can improve these parasthesias.  She has difficulty opening jars but denies difficulty with buttons.  She feels that her gait has become more unstable.    Review of patient's allergies indicates:   Allergen Reactions    Peanut Swelling     Throat swells and gets hives  Throat swells and gets hives  Throat swells and gets hives         Current Outpatient Medications   Medication Sig Dispense Refill    albuterol (PROVENTIL/VENTOLIN HFA) 90 mcg/actuation inhaler Inhale 2 puffs into the lungs every 6 (six) hours as needed for Wheezing. Rescue 18 g 4    blood sugar diagnostic (TRUE METRIX GLUCOSE TEST STRIP) Strp Use as directed to check blood sugar once daily. 100 strip 2    blood-glucose meter (TRUE METRIX AIR GLUCOSE METER) Misc Use as directed to check blood sugar once daily. 1 each 0    cetirizine (ZYRTEC) 10 MG tablet Take 1 tablet (10 mg total) by mouth once daily. 90 tablet 1    glipiZIDE (GLUCOTROL) 5 MG tablet Take 1 tablet (5 mg total) by mouth daily with breakfast. 30 tablet 11    HYDROcodone-acetaminophen (NORCO)  5-325 mg per tablet Take 1 tablet by mouth every 6 (six) hours as needed for Pain.      ibuprofen (ADVIL,MOTRIN) 800 MG tablet Take 800 mg by mouth 2 (two) times daily.      lancets (TRUEPLUS LANCETS) 33 gauge Misc Use as directed to check blood sugar once daily. 100 each 2    LIDOcaine (LIDODERM) 5 % Place 1 patch onto the skin once daily. Remove & Discard patch within 12 hours or as directed by MD 30 patch 2    metFORMIN (GLUCOPHAGE) 1000 MG tablet Take 1 tablet (1,000 mg total) by mouth 2 (two) times daily with meals. 180 tablet 1    methocarbamoL (ROBAXIN) 750 MG Tab Take 1 tablet (750 mg total) by mouth 3 (three) times daily as needed (muscular pain). 90 tablet 2    rosuvastatin (CRESTOR) 20 MG tablet Take 1 tablet (20 mg total) by mouth once daily. 90 tablet 3    tirzepatide 10 mg/0.5 mL PnIj Inject 10 mg into the skin every 7 days. 12 Pen 3    tirzepatide 7.5 mg/0.5 mL PnIj Inject 7.5 mg into the skin every 7 days. 12 Pen 1    lisinopriL-hydrochlorothiazide (PRINZIDE,ZESTORETIC) 20-12.5 mg per tablet lisinopril 20 mg-hydrochlorothiazide 12.5 mg tablet 90 tablet 3     No current facility-administered medications for this visit.       Past Medical History:   Diagnosis Date    Asthma     Diabetes mellitus      Past Surgical History:   Procedure Laterality Date    COLONOSCOPY 2022 in [place]    EPIDURAL STEROID INJECTION N/A 02/22/2024    Procedure: CERVICAL C7/T1 DARNELL;  Surgeon: Roxann Randle MD;  Location: [facility];  Service: Pain Management;  Laterality: N/A;  [phone]    INJECTION OF JOINT Bilateral 03/28/2024    Procedure: INJECTION, JOINT BILATERAL SI AND BILATERAL GTB;  Surgeon: Roxann Randle MD;  Location: [facility];  Service: Pain Management;  Laterality: Bilateral;  [phone]  2 WK F/U SHIRA  *SCHEDULE AFTER 3/21*    TRANSFORAMINAL EPIDURAL INJECTION OF STEROID Bilateral 08/22/2024    Procedure: LUMBAR TRANSFORAMINAL BILATERAL L4/5;  Surgeon: Roxann Randle MD;  Location: [facility];   Service: Pain Management;  Laterality: Bilateral;  [phone]  2 WK F/U EBEN     Family History    None       Social History     Socioeconomic History    Marital status: Single   Tobacco Use    Smoking status: Never    Smokeless tobacco: Never   Substance and Sexual Activity    Alcohol use: Not Currently    Drug use: Never    Sexual activity: Yes     Social Determinants of Health     Financial Resource Strain: Medium Risk (8/22/2024)    Overall Financial Resource Strain (CARDIA)     Difficulty of Paying Living Expenses: Somewhat hard   Food Insecurity: Food Insecurity Present (8/22/2024)    Hunger Vital Sign     Worried About Running Out of Food in the Last Year: Often true     Ran Out of Food in the Last Year: Sometimes true   Physical Activity: Insufficiently Active (8/22/2024)    Exercise Vital Sign     Days of Exercise per Week: 2 days     Minutes of Exercise per Session: 30 min   Stress: No Stress Concern Present (8/22/2024)    [employer] - Occupational Stress Questionnaire     Feeling of Stress : Not at all   Housing Stability: High Risk (8/22/2024)    Housing Stability Vital Sign     Unable to Pay for Housing in the Last Year: Yes       Review of Systems  14 point ROS was negative    OBJECTIVE:     Vital Signs  Pulse: 81  BP: 126/76  Pain Score:   5  There is no height or weight on file to calculate BMI.    Physical Exam:    Constitutional: She appears well-developed and well-nourished.     Eyes: Pupils are equal, round, and reactive to light.     Cardiovascular: Normal rate and regular rhythm.     Abdominal: Soft.     Psych/Behavior: She is alert. She is oriented to person, place, and time. She has a normal mood and affect.     Musculoskeletal: Gait is abnormal.        Neck: Range of motion is limited.        Back: Range of motion is limited.        Right Upper Extremities: Muscle strength is 5/5.        Left Upper Extremities: Muscle strength is 5/5.       Right Lower  Extremities: Muscle strength is 5/5.        Left Lower Extremities: Muscle strength is 5/5.     Neurological:        Coordination: She has a normal Romberg Test.        Sensory: There is no sensory deficit in the trunk. There is no sensory deficit in the extremities.        DTRs: DTRs are DTRS NORMAL AND SYMMETRICnormal and symmetric.        Cranial nerves: Cranial nerve(s) II, III, IV, V, VI, VII, VIII, IX, X, XI and XII are intact.     No nix's bilaterally    +tinel's at right wrist  +phalen's/reverse phalens at right wrist    Diagnostic Results:  MRI c spine: moderate to severe stenosis C5-7 without myelomalacia.  MRI L: stepwise anterolisthesis L3/4, 4/5.  Severe central stenosis L3-5.  Left greater than right moderate foraminal stenosis at L5/S1.  Flex/ex c: no instability  Flex/ex L: instability at L3/4, 4/5.  Reviewed    ASSESSMENT/PLAN:     64 F with known mutilevel degenerative lumbar stenosis and cervical stenosis who presents with worsened bilateral hand numbness/parasthesias predominantly in the first 3 fingers.  Her updated imaging is described above and does show fairly significant stenosis from C5-7 however, the patient doesn't have any overt signs of cervical myelopathy on exam.  Moreover, she does appear to have signs of carpal tunnel syndrome based on her physical exam.  Will plan to get CT c spine and EMG of her BUE prior to making any further recommendations.

## 2024-09-09 ENCOUNTER — OFFICE VISIT (OUTPATIENT)
Dept: PAIN MEDICINE | Facility: CLINIC | Age: 64
End: 2024-09-09
Payer: MEDICARE

## 2024-09-09 ENCOUNTER — LAB VISIT (OUTPATIENT)
Dept: LAB | Facility: OTHER | Age: 64
End: 2024-09-09
Payer: MEDICARE

## 2024-09-09 VITALS
TEMPERATURE: 98 F | HEART RATE: 75 BPM | WEIGHT: 183 LBS | SYSTOLIC BLOOD PRESSURE: 146 MMHG | RESPIRATION RATE: 18 BRPM | DIASTOLIC BLOOD PRESSURE: 80 MMHG | OXYGEN SATURATION: 100 % | HEIGHT: 63 IN | BODY MASS INDEX: 32.43 KG/M2

## 2024-09-09 DIAGNOSIS — R14.0 BLOATING: ICD-10-CM

## 2024-09-09 DIAGNOSIS — M54.12 CERVICAL RADICULOPATHY: ICD-10-CM

## 2024-09-09 DIAGNOSIS — R10.12 BILATERAL UPPER ABDOMINAL DISCOMFORT: ICD-10-CM

## 2024-09-09 DIAGNOSIS — M50.20 CERVICAL DISC HERNIATION: ICD-10-CM

## 2024-09-09 DIAGNOSIS — M48.02 CERVICAL STENOSIS OF SPINE: ICD-10-CM

## 2024-09-09 DIAGNOSIS — R10.11 BILATERAL UPPER ABDOMINAL DISCOMFORT: ICD-10-CM

## 2024-09-09 DIAGNOSIS — G89.4 CHRONIC PAIN SYNDROME: ICD-10-CM

## 2024-09-09 DIAGNOSIS — M54.16 LUMBAR RADICULOPATHY: ICD-10-CM

## 2024-09-09 DIAGNOSIS — M70.62 GREATER TROCHANTERIC BURSITIS OF LEFT HIP: Primary | ICD-10-CM

## 2024-09-09 LAB
IGA SERPL-MCNC: 245 MG/DL (ref 40–350)
LIPASE SERPL-CCNC: 416 U/L (ref 4–60)

## 2024-09-09 PROCEDURE — 99999 PR PBB SHADOW E&M-EST. PATIENT-LVL V: CPT | Mod: PBBFAC,HCNC,,

## 2024-09-09 PROCEDURE — 3052F HG A1C>EQUAL 8.0%<EQUAL 9.0%: CPT | Mod: HCNC,CPTII,S$GLB,

## 2024-09-09 PROCEDURE — 1101F PT FALLS ASSESS-DOCD LE1/YR: CPT | Mod: HCNC,CPTII,S$GLB,

## 2024-09-09 PROCEDURE — 99215 OFFICE O/P EST HI 40 MIN: CPT | Mod: HCNC,S$GLB,,

## 2024-09-09 PROCEDURE — 82784 ASSAY IGA/IGD/IGG/IGM EACH: CPT | Mod: HCNC

## 2024-09-09 PROCEDURE — 1160F RVW MEDS BY RX/DR IN RCRD: CPT | Mod: HCNC,CPTII,S$GLB,

## 2024-09-09 PROCEDURE — 3066F NEPHROPATHY DOC TX: CPT | Mod: HCNC,CPTII,S$GLB,

## 2024-09-09 PROCEDURE — 3288F FALL RISK ASSESSMENT DOCD: CPT | Mod: HCNC,CPTII,S$GLB,

## 2024-09-09 PROCEDURE — 3079F DIAST BP 80-89 MM HG: CPT | Mod: HCNC,CPTII,S$GLB,

## 2024-09-09 PROCEDURE — 3008F BODY MASS INDEX DOCD: CPT | Mod: HCNC,CPTII,S$GLB,

## 2024-09-09 PROCEDURE — 83690 ASSAY OF LIPASE: CPT | Mod: HCNC

## 2024-09-09 PROCEDURE — 3061F NEG MICROALBUMINURIA REV: CPT | Mod: HCNC,CPTII,S$GLB,

## 2024-09-09 PROCEDURE — 86364 TISS TRNSGLTMNASE EA IG CLAS: CPT | Mod: HCNC

## 2024-09-09 PROCEDURE — 36415 COLL VENOUS BLD VENIPUNCTURE: CPT | Mod: HCNC

## 2024-09-09 PROCEDURE — 3077F SYST BP >= 140 MM HG: CPT | Mod: HCNC,CPTII,S$GLB,

## 2024-09-09 PROCEDURE — 1159F MED LIST DOCD IN RCRD: CPT | Mod: HCNC,CPTII,S$GLB,

## 2024-09-09 NOTE — PROGRESS NOTES
Chronic patient Established Note (Follow up visit)      SUBJECTIVE:    INTERVAL HISTORY 9/9/2024:  Monica Knight returns to clinic for follow-up after bilateral L4/5 TFESI on 8/22/2024. She reports 80% pain relief of back pain. Today, she reports she has noticed more left anterior thigh pain and left lateral hip pain over the last week. The pain is worse with standing. She continues lidocaine patches, robaxin 750 mg, tylenol, advil with some relief. She denies any perceived side effects. Since her last office visit, she has followed-up with Dr Cole and is scheduled for bilateral upper extremity EMG and CT cervical spine. He suspects possible carpal tunnel. She would like to avoid surgery if possible. She also would like to avoid any additional injections at this time as she feels as if she has been receiving a lot recently. She denies recent falls or trauma. She denies new onset fever/night sweats, urinary incontinence, bowel incontinence, significant weight changes, significant motor weakness or changes, or loss of sensations. Her pain today is 5/10.     INTERVAL HISTORY 7/24/2024:   Monica Knight is a 64 y.o. female presenting to clinic for evaluation of neck and lower back pain. Patient reports that C7/T1 IL DARNELL on 2/22/2024 provided significant relief of symptoms for several months, currently her pain is 4/10. She notes significant numbness to bilateral hands over past several months, R>L, that is worst at night. She denies weakness and denies dropping objects. The patient endorses new difficulty with balance for past 5-6 weeks. States that she is feeling unstable, particularly when getting up from seated position or reaching for objects. She denies dizziness or recent falls.     She endorses worsening of her chronic lower back pain and radiation of the pain down to ankle on the L, and down to calf on the R. Patient has new job working as a  that requires standing for many hours and she is having  difficulty managing her pain. She has been doing the exercises that were provided during previous visit, alternating between hot and cold compress, topical Salonpas, taking PRN ibuprofen, tylenol, and Robaxin but pain is persistent. She reports her lower back pain is a 6/10 on a good day, 8/10 on bad day. She denies incontinence, fever, night sweats, unintentional weight loss, or further acute complaints at this time.     INTERVAL HISTORY 4/17/2024:  Monica Knight presents to the clinic for a follow-up appointment for chronic pain. Current pain intensity is 4/10.  Since the last visit, Monica Knight states:  she had 70% improvement with her bilateral SI and GTB injections.  She feels she is able to function much better now and does not need her medication (dual action tylenol/ibuprofen) as often.  She has looked for exercises online and continues to do light exercises at home.  She is overall satisfied with her pain management thus far.    PRIOR HISTORY:   Interval History 3/8/2024:  Monica Knight presents for follow-up from C7/T1 IL DARNELL on 2/22/2024.  She reports 85% relief that is on-going.  The pain in her neck today is 4/10.  Her main complaint today is of lower back pain with radiation into the right leg and lower leg to the foot and into the superior aspect of the left thigh.  She describes the pain as aching, and numbness at times.  The pain is worse with going from sitting to standing, sleeping on her sides, and getting in and out of the car.  Rest helps.  She reports not starting physical therapy as it is too expensive for her.  She would also like to discuss recent imaging results. She denies fever/night sweats, urinary incontinence, bowel incontinence, significant weight changes, significant motor weakness or changes, or loss of sensations.   Today's pain score in her lower back is 7/10.    Monica Knight presents to the clinic for the evaluation of neck and back pain. The back pain started  years ago and the neck pain started in April 2023 following no inciting event and symptoms have been worsening.The pain is located in the lower back area and radiates to the right > left leg (left thigh only).  The neck pain radiates down both arms with tingling in both hands.  The pain is described as dull, numbing, tight band, and tingling and is rated as 6/10. The pain is rated with a score of  3/10 on the BEST day and a score of 6/10 on the WORST day.  The lower back sometimes gets to a 6 or 7, but the pain has been easing up. Symptoms interfere with daily activity. The lower back pain is exacerbated by Sitting, Standing, and Lifting.  The pain is mitigated by ice and injection. The patient reports 4-6 hours of uninterrupted sleep per night.    Patient denies urinary incontinence and bowel incontinence.  She reports her balance has been getting worse in the past few months.  She states that she relocated recently.  She recently saw Dr. Neff in [facility] who did an epidural injection (Deceber 18th, 2023).  She states that the pain improved, but not for a long time.  She had a neck injection in July 2023.  She was with [facility] initially (Orthopedics and Pain management), however she had to switch to [facility] since her insurance changed.    Physical Therapy/Home Exercise: June 2023 (had PT for lower back).  She does some leg lifts that she learned from PT.  She also tries to walk at home.      Pain Medications:   - tramadol   - ibuprofen   - tylenol (combined with ibuprofen)   - hydrocodone     report:  Reviewed and consistent with medication use as prescribed.  12/15/2023 11/16/2023 1 Hydrocodone-Acetamin 5-325 Mg 30.00 7 Sherron Hub     Pain Procedures:   Lumbar DARNELL December 2023  Cervical DARNELL July 2023 2/22/2024 - C7/T1 IL DARNELL - 85% relief  3/28/2024 - bilateral SI and GTB - 75% relief  8/22/2024 - Bilateral L4/5 TFESI - 80% relief    Imaging:   XR HIPS BILATERAL 2 VIEW INCL AP PELVIS 3/28/24  CLINICAL  HISTORY:  Trochanteric bursitis, right hip  TECHNIQUE:  AP view of the pelvis and frogleg lateral views of both hips were performed.  COMPARISON:  None.  FINDINGS:  No fracture or dislocation.  Hip joint spaces are preserved.  Degenerative changes in the lower lumbar spine.  No soft tissue swelling or calcifications.\  Impression:  As above.  Electronically signed by:Favio Priest  Date:                                            03/08/2024  Time:                                           13:20    XR LUMBAR SPINE 5 VIEW WITH FLEX AND EXT  CLINICAL HISTORY:  Spondylolisthesis, lumbar region  TECHNIQUE:  AP, lateral, and oblique images are performed through the lumbar spine.  COMPARISON:  None  FINDINGS:  Lumbar vertebral body heights are maintained.    Disc space narrowing and endplate changes L5-S1.  Facet arthropathy lower lumbar spine.  Grade 1 anterolisthesis L3 on L4 and L4 on L5 with no significant change with flexion or extension.  Impression:  Degenerative change lower lumbar spine as above.     Electronically signed by: Isela Jackson  Date:                                            01/17/2024  Time:                                           14:39    XR CERVICAL SPINE 5 VIEW WITH FLEX AND EXT  CLINICAL HISTORY:  Spinal stenosis, cervical region  TECHNIQUE:  Five views of the cervical spine plus flexion and extension views were performed.  COMPARISON:  None.  FINDINGS:  Vertebral body heights are maintained.  Disc space narrowing and endplate changes C5-6 and C6-7. oblique views show no significant bony narrowing of the neural foramina.  Straightening of the normal cervical lordosis.  Slight grade 1 retrolisthesis C5-6 with no significant change with flexion or extension  No significant prevertebral soft tissue edema.  Impression:  Degenerative change C5-6 and C6-7.    Electronically signed by: Isela Jackson  Date:                                            01/17/2024  Time:                                            14:38    MRI Lumbar 12/11/23:  IMPRESSION:  Chronic L4-5 spondylolisthesis with new, 3mm anterior offset of L3 relative to L4.  L3-4 broad-based right foraminal 2.6mm disc herniation with superimposed annular bulging and posterior element hypertrophy.  There is moderate canal and severe right greater than left foraminal stenosis.  L4-5 diffuse disc bulge with posterior element hypertrophy.  There is severe canal and right greater than left foraminal stenosis. Facet hypertrophy and left facet effusion identified.  L5-S1 uncovering and bulging of the disc with caudal migration identified. Facet hypertrophy is evident, the left foramen is mildly narrowed.    Signature  Electronically signed: Jared Fernandez MD on 12-, 01:08PM      MRI Cervical 3/17/2023  IMPRESSION:  Multilevel, multifactorial cervical degenerative changes as detailed above.  The most notable levels are C5-6 and C6-7 where there are advanced spinal canal and foraminal stenosis. There is flattening of the spinal cord but no abnormal cord signal.    Signed: Harman Palomo MD    Pain Disability Index Review:      9/9/2024     1:22 PM 7/24/2024     2:23 PM 4/17/2024     1:48 PM   Last 3 PDI Scores   Pain Disability Index (PDI) 25 35 25       Allergies:   Review of patient's allergies indicates:   Allergen Reactions    Peanut Swelling     Throat swells and gets hives  Throat swells and gets hives  Throat swells and gets hives         Current Medications:   Current Outpatient Medications   Medication Sig Dispense Refill    albuterol (PROVENTIL/VENTOLIN HFA) 90 mcg/actuation inhaler Inhale 2 puffs into the lungs every 6 (six) hours as needed for Wheezing. Rescue 18 g 4    blood sugar diagnostic (TRUE METRIX GLUCOSE TEST STRIP) Strp Use as directed to check blood sugar once daily. 100 strip 2    blood-glucose meter (TRUE METRIX AIR GLUCOSE METER) Misc Use as directed to check blood sugar once daily. 1 each 0    cetirizine (ZYRTEC) 10 MG  tablet Take 1 tablet (10 mg total) by mouth once daily. 90 tablet 1    glipiZIDE (GLUCOTROL) 5 MG tablet Take 1 tablet (5 mg total) by mouth daily with breakfast. 30 tablet 11    HYDROcodone-acetaminophen (NORCO) 5-325 mg per tablet Take 1 tablet by mouth every 6 (six) hours as needed for Pain.      ibuprofen (ADVIL,MOTRIN) 800 MG tablet Take 800 mg by mouth 2 (two) times daily.      lancets (TRUEPLUS LANCETS) 33 gauge Misc Use as directed to check blood sugar once daily. 100 each 2    LIDOcaine (LIDODERM) 5 % Place 1 patch onto the skin once daily. Remove & Discard patch within 12 hours or as directed by MD 30 patch 2    metFORMIN (GLUCOPHAGE) 1000 MG tablet Take 1 tablet (1,000 mg total) by mouth 2 (two) times daily with meals. 180 tablet 1    methocarbamoL (ROBAXIN) 750 MG Tab Take 1 tablet (750 mg total) by mouth 3 (three) times daily as needed (muscular pain). 90 tablet 2    rosuvastatin (CRESTOR) 20 MG tablet Take 1 tablet (20 mg total) by mouth once daily. 90 tablet 3    tirzepatide 10 mg/0.5 mL PnIj Inject 10 mg into the skin every 7 days. 12 Pen 3    tirzepatide 7.5 mg/0.5 mL PnIj Inject 7.5 mg into the skin every 7 days. 12 Pen 1    lisinopriL-hydrochlorothiazide (PRINZIDE,ZESTORETIC) 20-12.5 mg per tablet lisinopril 20 mg-hydrochlorothiazide 12.5 mg tablet 90 tablet 3     No current facility-administered medications for this visit.       REVIEW OF SYSTEMS:    GENERAL:  No new weight loss, malaise or fevers.  HEENT:  Negative for new frequent or significant headaches.  NECK:  Negative for new lumps, goiter, and significant neck swelling.  RESPIRATORY:  Negative for new cough, wheezing or shortness of breath.  CARDIOVASCULAR:  Negative for new chest pain, leg swelling or palpitations.  GI:  Negative for new abdominal discomfort, blood in stools or black stools or change in bowel habits.  MUSCULOSKELETAL:  See HPI.  SKIN:  Negative for new lesions, rash, and itching.  PSYCH:  Negative for new sleep  disturbance, mood disorder  HEMATOLOGY/LYMPHOLOGY:  Negative for new prolonged bleeding, bruising easily or swollen nodes.  NEURO:   No new headaches, syncope, paralysis, seizures or tremors. Numbness to bilateral hands, new difficulty with balance   All other reviewed and negative other than HPI.    Past Medical History:  Past Medical History:   Diagnosis Date    Asthma     Diabetes mellitus        Past Surgical History:  Past Surgical History:   Procedure Laterality Date    COLONOSCOPY      2022 in [place]    EPIDURAL STEROID INJECTION N/A 02/22/2024    Procedure: CERVICAL C7/T1 DARNELL;  Surgeon: Roxann Randle MD;  Location: [facility];  Service: Pain Management;  Laterality: N/A;  [phone]    INJECTION OF JOINT Bilateral 03/28/2024    Procedure: INJECTION, JOINT BILATERAL SI AND BILATERAL GTB;  Surgeon: Roxann Randle MD;  Location: [facility];  Service: Pain Management;  Laterality: Bilateral;  [phone]  2 WK F/U SHIRA  *SCHEDULE AFTER 3/21*    TRANSFORAMINAL EPIDURAL INJECTION OF STEROID Bilateral 08/22/2024    Procedure: LUMBAR TRANSFORAMINAL BILATERAL L4/5;  Surgeon: Roxann Randle MD;  Location: [facility];  Service: Pain Management;  Laterality: Bilateral;  [phone]  2 WK F/U EBEN       Family History:  Family History   Problem Relation Name Age of Onset    Colon cancer Neg Hx         Social History:  Social History     Socioeconomic History    Marital status: Single   Tobacco Use    Smoking status: Never    Smokeless tobacco: Never   Substance and Sexual Activity    Alcohol use: Not Currently    Drug use: Never    Sexual activity: Yes     Social Determinants of Health     Financial Resource Strain: Medium Risk (8/22/2024)    Overall Financial Resource Strain (CARDIA)     Difficulty of Paying Living Expenses: Somewhat hard   Food Insecurity: Food Insecurity Present (8/22/2024)    Hunger Vital Sign     Worried About Running Out of Food in the Last Year: Often true     Ran Out of Food in the Last  "Year: Sometimes true   Physical Activity: Insufficiently Active (8/22/2024)    Exercise Vital Sign     Days of Exercise per Week: 2 days     Minutes of Exercise per Session: 30 min   Stress: No Stress Concern Present (8/22/2024)    [employer] [place] [employer] - Occupational Stress Questionnaire     Feeling of Stress : Not at all   Housing Stability: High Risk (8/22/2024)    Housing Stability Vital Sign     Unable to Pay for Housing in the Last Year: Yes       OBJECTIVE:     BP (!) 146/80   Pulse 75   Temp 98 °F (36.7 °C)   Resp 18   Ht 5' 3" (1.6 m)   Wt 83 kg (182 lb 15.7 oz)   SpO2 100%   BMI 32.41 kg/m²     PHYSICAL EXAMINATION:  General appearance: Well appearing, in no acute distress, alert and appropriately communicative.  Psych:  Mood and affect appropriate.  Skin: Skin color, texture, turgor normal, no rashes or lesions, in both upper and lower body for exposed skin.  Head/face:  Atraumatic, normocephalic.  Neck: Pain with neck flexion, extension, and lateral flexion  Cor: regular rate  Pulm: non-labored breathing  GI: Abdomen non-distended.  Back: Straight leg raising in the sitting and supine positions is negative for radicular pain bilaterally. No pain to palpation over paraspinal muscles bilaterally.Limited ROM without pain on extension or facet loading bilaterally. No pain to palpation over bilateral SI joints.   Extremities: No deformities, significant lymphedema, or skin discoloration. No significant open wounds. No major amputations.   Musculoskeletal: Left GTB tenderness with tenderness to palpation along IT band to knee. LEE ANN reproduces pain. Bilateral upper and lower extremity strength is normal and symmetric.  No atrophy or tone abnormalities are noted.  Neuro: Bilateral upper and lower extremity coordination and muscle stretch reflexes abnormalities noted: none.  Bolanos and/or Clonus: negative; loss of sensation to light touch: none  Gait: Antalgic-ambulates with straight " cane.     CMP  Sodium   Date Value Ref Range Status   08/02/2024 142 136 - 145 mmol/L Final     Potassium   Date Value Ref Range Status   08/02/2024 4.4 3.5 - 5.1 mmol/L Final     Chloride   Date Value Ref Range Status   08/02/2024 108 95 - 110 mmol/L Final     CO2   Date Value Ref Range Status   08/02/2024 27 23 - 29 mmol/L Final     Glucose   Date Value Ref Range Status   08/02/2024 164 (H) 70 - 110 mg/dL Final     BUN   Date Value Ref Range Status   08/02/2024 12 8 - 23 mg/dL Final     Creatinine   Date Value Ref Range Status   08/02/2024 0.9 0.5 - 1.4 mg/dL Final     Calcium   Date Value Ref Range Status   08/02/2024 9.9 8.7 - 10.5 mg/dL Final     Total Protein   Date Value Ref Range Status   08/02/2024 7.4 6.0 - 8.4 g/dL Final     Albumin   Date Value Ref Range Status   08/02/2024 4.0 3.5 - 5.2 g/dL Final     Total Bilirubin   Date Value Ref Range Status   08/02/2024 0.5 0.1 - 1.0 mg/dL Final     Comment:     For infants and newborns, interpretation of results should be based  on gestational age, weight and in agreement with clinical  observations.    Premature Infant recommended reference ranges:  Up to 24 hours.............<8.0 mg/dL  Up to 48 hours............<12.0 mg/dL  3-5 days..................<15.0 mg/dL  6-29 days.................<15.0 mg/dL       Alkaline Phosphatase   Date Value Ref Range Status   08/02/2024 66 55 - 135 U/L Final     AST   Date Value Ref Range Status   08/02/2024 18 10 - 40 U/L Final     ALT   Date Value Ref Range Status   08/02/2024 12 10 - 44 U/L Final     Anion Gap   Date Value Ref Range Status   08/02/2024 7 (L) 8 - 16 mmol/L Final     eGFR   Date Value Ref Range Status   08/02/2024 >60.0 >60 mL/min/1.73 m^2 Final       ASSESSMENT: 64 y.o. year old female with chronic neck pain, lower back pain and new numbness to bilateral hands, R>L, and new difficulty with balance.      1. Greater trochanteric bursitis of left hip        2. Lumbar radiculopathy        3. Chronic pain syndrome         4. Cervical stenosis of spine        5. Cervical disc herniation        6. Cervical radiculopathy              IMPRESSION: Monica Knight presents today for chronic lower back pain. History and physical exam are consistent with cervical and lumbar radiculopathy. MRI from 1/17/24 done at an outside facility ([facility]) reviewed independently. Imaging is consistent with cervical stenosis at multiple levels with central and foraminal stenosis at multiple levels which is worst at C6/7 and lumbar degenerative disc disease with spondylolisthesis, central narrowing, and foraminal narrowing at multiple levels, of which the worst is at L4/5 bilaterally.  She has done well in the past with her cervical epidural injection and bilateral SI/GTB injections, however she is now having issues with balance and persistent cervical radiculopathy.  We will send her for evaluation regarding her cervical radiculopathy and new balance issues, prior to considering repeat cervical epidural injections.  Regarding her lumbar radiculopathy, she may benefit from lumbar epidural injections.    PLAN:   - Previous imaging was reviewed and discussed with the patient today.   - continue lidoderm patches over GTB at night  - continue voltaren gel to GTB area during the day.   - continue Robaxin 750 mg 1-2 times per day.  - continue Advil dual  - Add ice to GTB  - Keep currently scheduled CT cervical spine and EMG appointments.   - She is s/p Bilateral L4/L5 TFESI with 80% pain relief  - I have stressed the importance of physical activity and a home exercise plan to help with pain and improve health.  - Patient can continue with medications for now since they are providing benefits, using them appropriately, and without side effects.  - Patient would like to defer additional steroid injections at this time.  - Provided patient with Bursitis exercises and stretches via AVS  - RTC 3-4 weeks.  - Consider Left GTB injection if no  improvement with conservative therapies  - Counseled patient regarding the importance of activity modification and physical therapy.    The above plan and management options were discussed at length with patient. Patient is in agreement with the above and verbalized understanding.    Ivette Milton NP  09/09/2024    I spent a total of 42 minutes on the day of the visit.  This includes face to face time and non-face to face time preparing to see the patient (eg, review of tests), obtaining and/or reviewing separately obtained history, documenting clinical information in the electronic or other health record, independently interpreting results and communicating results to the patient/family/caregiver, or care coordinator.

## 2024-09-10 ENCOUNTER — TELEPHONE (OUTPATIENT)
Dept: GASTROENTEROLOGY | Facility: CLINIC | Age: 64
End: 2024-09-10
Payer: MEDICARE

## 2024-09-10 ENCOUNTER — TELEPHONE (OUTPATIENT)
Dept: PAIN MEDICINE | Facility: CLINIC | Age: 64
End: 2024-09-10
Payer: MEDICARE

## 2024-09-10 NOTE — TELEPHONE ENCOUNTER
----- Message from Humera Carvajal NP sent at 9/10/2024  4:02 PM CDT -----  I have attempted to reach out to patient today to discuss lipase and further recommendations left voicemail for call back. As discussed on lipase result note, I recommend going to ED due to elevated lipase indicating pancreatitis.  Additionally, recommend stopping Mounjaro due to side effect of pancreatitis and consult with prescribing provider.    Thank you,  SOL Grubbs

## 2024-09-10 NOTE — TELEPHONE ENCOUNTER
----- Message from Ivette Milton NP sent at 9/9/2024  2:13 PM CDT -----  Can we call patient to schedule her ordered CT from Dr Cole, also can we provide her with phone number to neurology so she can schedule her ordered EMG.  Thanks,  Ivette

## 2024-09-13 LAB — TTG IGA SER-ACNC: 0.4 U/ML

## 2024-09-16 ENCOUNTER — TELEPHONE (OUTPATIENT)
Dept: NEUROSURGERY | Facility: CLINIC | Age: 64
End: 2024-09-16
Payer: MEDICARE

## 2024-09-16 NOTE — TELEPHONE ENCOUNTER
----- Message from Rose Marie Cuevas sent at 9/13/2024  3:12 PM CDT -----  Regarding: appt access  Contact: pt [phone]  Patient calling to schedule CT scan. Pls call

## 2024-09-17 ENCOUNTER — HOSPITAL ENCOUNTER (OUTPATIENT)
Dept: RADIOLOGY | Facility: CLINIC | Age: 64
Discharge: HOME | End: 2024-09-17
Attending: INTERNAL MEDICINE
Payer: MEDICARE

## 2024-09-17 DIAGNOSIS — Z78.0 ASYMPTOMATIC MENOPAUSAL STATE: ICD-10-CM

## 2024-09-17 PROCEDURE — 77080 DXA BONE DENSITY AXIAL: CPT | Mod: TC,HCNC

## 2024-09-17 PROCEDURE — 77080 DXA BONE DENSITY AXIAL: CPT | Mod: 26,HCNC,, | Performed by: INTERNAL MEDICINE

## 2024-09-26 ENCOUNTER — TELEPHONE (OUTPATIENT)
Dept: INTERNAL MEDICINE | Facility: CLINIC | Age: 64
End: 2024-09-26
Payer: MEDICARE

## 2024-09-26 NOTE — TELEPHONE ENCOUNTER
----- Message from Ellie Cornell sent at 9/26/2024  3:51 PM CDT -----  Type:  Same Day Appointment Request    Caller is requesting a same day appointment.  Caller declined first available appointment listed below.    Name of Caller:pt  When is the first available appointment? n/a  Symptoms:IR Test Results  Best Call Back Number: [phone]  Additional Information:

## 2024-09-27 ENCOUNTER — PATIENT MESSAGE (OUTPATIENT)
Dept: PODIATRY | Facility: CLINIC | Age: 64
End: 2024-09-27
Payer: MEDICARE

## 2024-09-30 ENCOUNTER — TELEPHONE (OUTPATIENT)
Dept: INTERNAL MEDICINE | Facility: CLINIC | Age: 64
End: 2024-09-30
Payer: MEDICARE

## 2024-09-30 NOTE — TELEPHONE ENCOUNTER
----- Message from Genesis sent at 9/30/2024  2:46 PM CDT -----  Contact: [phone]  Patient is returning a phone call.    Who left a message for the patient: Dr Cantu     Does patient know what this is regarding:  no     Would you like a call back, or a response through your [facility] portal?:   phone     Comments:

## 2024-09-30 NOTE — TELEPHONE ENCOUNTER
Called pt and said one of her doctors told her to go to ER and she refused because of elevated lipase test. Pt then requested appointment with primary care as a follow up. Routing message to nurse for further treatment on when to be seen.

## 2024-10-01 ENCOUNTER — TELEPHONE (OUTPATIENT)
Dept: INTERNAL MEDICINE | Facility: CLINIC | Age: 64
End: 2024-10-01
Payer: MEDICARE

## 2024-10-01 DIAGNOSIS — R74.8 ELEVATED LIPASE: Primary | ICD-10-CM

## 2024-10-01 NOTE — TELEPHONE ENCOUNTER
Patient is concerned that she needs follow up to discuss the xray results of the abdomen. Is requesting a call from the office to determine whether she will need the EGD after xray.

## 2024-10-01 NOTE — TELEPHONE ENCOUNTER
Patient called in this morning with a concern about her lipase enzymes. Did not go to the ER a few weeks backs for evaluation of possible pancreatitis due to Mounjaro but declines to get off medication at this time.Does have a xray of the abdomen scheduled for Oct 4th.Did send a message to Gastro to reach out to patient as well for some follow up. PA will put in follow up lab to be drawn before coming in. Patient verbalized understanding with read back x3.

## 2024-10-04 ENCOUNTER — HOSPITAL ENCOUNTER (OUTPATIENT)
Dept: RADIOLOGY | Facility: HOSPITAL | Age: 64
Discharge: HOME | End: 2024-10-04
Payer: MEDICARE

## 2024-10-04 ENCOUNTER — LAB VISIT (OUTPATIENT)
Dept: LAB | Facility: HOSPITAL | Age: 64
End: 2024-10-04
Attending: PHYSICIAN ASSISTANT
Payer: MEDICARE

## 2024-10-04 ENCOUNTER — OFFICE VISIT (OUTPATIENT)
Dept: PODIATRY | Facility: CLINIC | Age: 64
End: 2024-10-04
Payer: MEDICARE

## 2024-10-04 VITALS
DIASTOLIC BLOOD PRESSURE: 67 MMHG | SYSTOLIC BLOOD PRESSURE: 122 MMHG | WEIGHT: 178.81 LBS | HEIGHT: 63 IN | HEART RATE: 80 BPM | BODY MASS INDEX: 31.68 KG/M2

## 2024-10-04 DIAGNOSIS — B35.3 TINEA PEDIS OF BOTH FEET: ICD-10-CM

## 2024-10-04 DIAGNOSIS — E11.9 TYPE 2 DIABETES MELLITUS WITHOUT COMPLICATION, WITHOUT LONG-TERM CURRENT USE OF INSULIN: ICD-10-CM

## 2024-10-04 DIAGNOSIS — R14.0 BLOATING: ICD-10-CM

## 2024-10-04 DIAGNOSIS — R15.1 FECAL SMEARING: ICD-10-CM

## 2024-10-04 DIAGNOSIS — R14.0 FLATULENCE/GAS PAIN/BELCHING: ICD-10-CM

## 2024-10-04 DIAGNOSIS — E11.42 DIABETIC POLYNEUROPATHY ASSOCIATED WITH TYPE 2 DIABETES MELLITUS: Primary | ICD-10-CM

## 2024-10-04 DIAGNOSIS — R74.8 ELEVATED LIPASE: ICD-10-CM

## 2024-10-04 LAB
ALBUMIN SERPL BCP-MCNC: 3.8 G/DL (ref 3.5–5.2)
ALP SERPL-CCNC: 73 U/L (ref 55–135)
ALT SERPL W/O P-5'-P-CCNC: 11 U/L (ref 10–44)
ANION GAP SERPL CALC-SCNC: 4 MMOL/L (ref 8–16)
AST SERPL-CCNC: 16 U/L (ref 10–40)
BILIRUB SERPL-MCNC: 0.4 MG/DL (ref 0.1–1)
BUN SERPL-MCNC: 11 MG/DL (ref 8–23)
CALCIUM SERPL-MCNC: 9.9 MG/DL (ref 8.7–10.5)
CHLORIDE SERPL-SCNC: 106 MMOL/L (ref 95–110)
CO2 SERPL-SCNC: 30 MMOL/L (ref 23–29)
CREAT SERPL-MCNC: 0.9 MG/DL (ref 0.5–1.4)
EST. GFR  (NO RACE VARIABLE): >60 ML/MIN/1.73 M^2
GLUCOSE SERPL-MCNC: 179 MG/DL (ref 70–110)
LIPASE SERPL-CCNC: 37 U/L (ref 4–60)
POTASSIUM SERPL-SCNC: 4.2 MMOL/L (ref 3.5–5.1)
PROT SERPL-MCNC: 7.1 G/DL (ref 6–8.4)
SODIUM SERPL-SCNC: 140 MMOL/L (ref 136–145)

## 2024-10-04 PROCEDURE — 74018 RADEX ABDOMEN 1 VIEW: CPT | Mod: 26,,, | Performed by: RADIOLOGY

## 2024-10-04 PROCEDURE — 83690 ASSAY OF LIPASE: CPT | Mod: HCNC | Performed by: PHYSICIAN ASSISTANT

## 2024-10-04 PROCEDURE — 99999 PR PBB SHADOW E&M-EST. PATIENT-LVL IV: CPT | Mod: PBBFAC,HCNC,, | Performed by: PODIATRIST

## 2024-10-04 PROCEDURE — 74018 RADEX ABDOMEN 1 VIEW: CPT | Mod: TC

## 2024-10-04 PROCEDURE — 36415 COLL VENOUS BLD VENIPUNCTURE: CPT | Mod: HCNC,PN | Performed by: PHYSICIAN ASSISTANT

## 2024-10-04 PROCEDURE — 80053 COMPREHEN METABOLIC PANEL: CPT | Mod: HCNC | Performed by: PHYSICIAN ASSISTANT

## 2024-10-04 RX ORDER — KETOCONAZOLE 20 MG/G
CREAM TOPICAL DAILY
Qty: 30 G | Refills: 1 | Status: SHIPPED | OUTPATIENT
Start: 2024-10-04

## 2024-10-04 NOTE — PROGRESS NOTES
Subjective:      Patient ID: Monica Knight is a 64 y.o. female.    Chief Complaint:   Diabetic Foot Exam (8/2/24 - Shirley Cantu MD, PCP)    Monica is a 64 y.o. female who presents to the clinic for evaluation and treatment of high risk feet. Monica has a past medical history of Asthma and Diabetes mellitus.      Pt here for foot exam  + N/T... likely from back.  Glucose is improving  - smoking   + pedicures    Last saw podiatry in [place]        PCP: Shirley Cantu MD    Date Last Seen by PCP: 8.2.24    Current shoe gear:  Affected Foot: Casual shoes     Unaffected Foot: Casual shoes    Hemoglobin A1C   Date Value Ref Range Status   08/02/2024 8.2 (H) 4.0 - 5.6 % Final     Comment:     ADA Screening Guidelines:  5.7-6.4%  Consistent with prediabetes  >or=6.5%  Consistent with diabetes    High levels of fetal hemoglobin interfere with the HbA1C  assay. Heterozygous hemoglobin variants (HbS, HgC, etc)do  not significantly interfere with this assay.   However, presence of multiple variants may affect accuracy.     05/16/2024 7.6 (H) 4.0 - 5.6 % Final     Comment:     ADA Screening Guidelines:  5.7-6.4%  Consistent with prediabetes  >or=6.5%  Consistent with diabetes    High levels of fetal hemoglobin interfere with the HbA1C  assay. Heterozygous hemoglobin variants (HbS, HgC, etc)do  not significantly interfere with this assay.   However, presence of multiple variants may affect accuracy.     01/10/2024 8.2 (H) 4.0 - 5.6 % Final     Comment:     ADA Screening Guidelines:  5.7-6.4%  Consistent with prediabetes  >or=6.5%  Consistent with diabetes    High levels of fetal hemoglobin interfere with the HbA1C  assay. Heterozygous hemoglobin variants (HbS, HgC, etc)do  not significantly interfere with this assay.   However, presence of multiple variants may affect accuracy.          Past Medical History:   Diagnosis Date    Asthma     Diabetes mellitus      Past Surgical History:   Procedure Laterality Date     COLONOSCOPY      2022 in [place]    EPIDURAL STEROID INJECTION N/A 02/22/2024    Procedure: CERVICAL C7/T1 DARNELL;  Surgeon: Roxann Randle MD;  Location: [facility];  Service: Pain Management;  Laterality: N/A;  [phone]    INJECTION OF JOINT Bilateral 03/28/2024    Procedure: INJECTION, JOINT BILATERAL SI AND BILATERAL GTB;  Surgeon: Roxann Randle MD;  Location: [facility];  Service: Pain Management;  Laterality: Bilateral;  [phone]  2 WK F/U SHIRA  *SCHEDULE AFTER 3/21*    TRANSFORAMINAL EPIDURAL INJECTION OF STEROID Bilateral 08/22/2024    Procedure: LUMBAR TRANSFORAMINAL BILATERAL L4/5;  Surgeon: Roxann Randle MD;  Location: [facility];  Service: Pain Management;  Laterality: Bilateral;  [phone]  2 WK F/U EBEN     Current Outpatient Medications on File Prior to Visit   Medication Sig Dispense Refill    albuterol (PROVENTIL/VENTOLIN HFA) 90 mcg/actuation inhaler Inhale 2 puffs into the lungs every 6 (six) hours as needed for Wheezing. Rescue 18 g 4    blood sugar diagnostic (TRUE METRIX GLUCOSE TEST STRIP) Strp Use as directed to check blood sugar once daily. 100 strip 2    blood-glucose meter (TRUE METRIX AIR GLUCOSE METER) Misc Use as directed to check blood sugar once daily. 1 each 0    cetirizine (ZYRTEC) 10 MG tablet Take 1 tablet (10 mg total) by mouth once daily. 90 tablet 1    glipiZIDE (GLUCOTROL) 5 MG tablet Take 1 tablet (5 mg total) by mouth daily with breakfast. 30 tablet 11    HYDROcodone-acetaminophen (NORCO) 5-325 mg per tablet Take 1 tablet by mouth every 6 (six) hours as needed for Pain.      ibuprofen (ADVIL,MOTRIN) 800 MG tablet Take 800 mg by mouth 2 (two) times daily.      lancets (TRUEPLUS LANCETS) 33 gauge Misc Use as directed to check blood sugar once daily. 100 each 2    LIDOcaine (LIDODERM) 5 % Place 1 patch onto the skin once daily. Remove & Discard patch within 12 hours or as directed by MD 30 patch 2    metFORMIN (GLUCOPHAGE) 1000 MG tablet Take 1 tablet (1,000 mg  total) by mouth 2 (two) times daily with meals. 180 tablet 1    methocarbamoL (ROBAXIN) 750 MG Tab Take 1 tablet (750 mg total) by mouth 3 (three) times daily as needed (muscular pain). 90 tablet 2    rosuvastatin (CRESTOR) 20 MG tablet Take 1 tablet (20 mg total) by mouth once daily. 90 tablet 3    tirzepatide 10 mg/0.5 mL PnIj Inject 10 mg into the skin every 7 days. 12 Pen 3    tirzepatide 7.5 mg/0.5 mL PnIj Inject 7.5 mg into the skin every 7 days. 12 Pen 1    lisinopriL-hydrochlorothiazide (PRINZIDE,ZESTORETIC) 20-12.5 mg per tablet lisinopril 20 mg-hydrochlorothiazide 12.5 mg tablet 90 tablet 3     No current facility-administered medications on file prior to visit.     Review of patient's allergies indicates:   Allergen Reactions    Peanut Swelling     Throat swells and gets hives  Throat swells and gets hives  Throat swells and gets hives         Review of Systems   Constitutional: Negative for chills, decreased appetite, fever, malaise/fatigue, night sweats, weight gain and weight loss.   Cardiovascular:  Negative for chest pain, claudication, dyspnea on exertion, leg swelling, palpitations and syncope.   Respiratory:  Negative for cough and shortness of breath.    Endocrine: Negative for cold intolerance and heat intolerance.   Hematologic/Lymphatic: Negative for bleeding problem. Does not bruise/bleed easily.   Skin:  Negative for color change, dry skin, flushing, itching, nail changes, poor wound healing, rash, skin cancer, suspicious lesions and unusual hair distribution.   Musculoskeletal:  Positive for back pain. Negative for arthritis, falls, gout, joint pain, joint swelling, muscle cramps, muscle weakness, myalgias, neck pain and stiffness.   Gastrointestinal:  Negative for diarrhea, nausea and vomiting.   Neurological:  Positive for numbness and paresthesias. Negative for dizziness, focal weakness, light-headedness, tremors, vertigo and weakness.   Psychiatric/Behavioral:  Negative for altered  "mental status and depression. The patient does not have insomnia.    Allergic/Immunologic: Negative.            Objective:       Vitals:    10/04/24 0735   BP: 122/67   Pulse: 80   Weight: 81.1 kg (178 lb 12.7 oz)   Height: 5' 3" (1.6 m)   PainSc: 0-No pain   PainLoc: Foot   81.1 kg (178 lb 12.7 oz)     Physical Exam  Vitals reviewed.   Constitutional:       General: She is not in acute distress.     Appearance: She is well-developed. She is not ill-appearing, toxic-appearing or diaphoretic.      Comments: Proper supportive shoegear      Cardiovascular:      Pulses:           Dorsalis pedis pulses are 2+ on the right side and 2+ on the left side.        Posterior tibial pulses are 1+ on the right side and 1+ on the left side.   Musculoskeletal:         General: No tenderness.      Right lower leg: No edema.      Left lower leg: No edema.      Right ankle: Normal.      Right Achilles Tendon: Normal.      Left ankle: Normal.      Left Achilles Tendon: Normal.      Right foot: Decreased range of motion. Deformity and prominent metatarsal heads present. No tenderness or bony tenderness.      Left foot: Decreased range of motion. Deformity and prominent metatarsal heads present. No tenderness or bony tenderness.      Comments: Flexible pes planus foot type w/ medial arch collapse and mild gastroc equinus       Reducible extensor and flexor contractures at the MTPJ and PIPJ of toes 2-5, bilat.          Feet:      Right foot:      Protective Sensation: 10 sites tested.  10 sites sensed.      Skin integrity: Dry skin present. No ulcer, blister, skin breakdown, erythema, warmth or callus.      Toenail Condition: Right toenails are normal.      Left foot:      Protective Sensation: 10 sites tested.  10 sites sensed.      Skin integrity: Dry skin present. No ulcer, blister, skin breakdown, erythema, warmth or callus.      Toenail Condition: Left toenails are normal.      Comments: Gross swm intact    Plantar peeling  Skin:     " "General: Skin is warm and dry.      Capillary Refill: Capillary refill takes 2 to 3 seconds.      Coloration: Skin is not pale.      Findings: No erythema or rash.   Neurological:      Mental Status: She is alert and oriented to person, place, and time.      Sensory: Sensory deficit present.      Gait: Gait is intact.   Psychiatric:         Attention and Perception: Attention normal.         Mood and Affect: Mood normal.         Speech: Speech normal.         Behavior: Behavior normal.         Thought Content: Thought content normal.         Cognition and Memory: Cognition normal.         Judgment: Judgment normal.               Assessment:       Encounter Diagnoses   Name Primary?    Type 2 diabetes mellitus without complication, without long-term current use of insulin     Diabetic polyneuropathy associated with type 2 diabetes mellitus Yes    Tinea pedis of both feet          Plan:       Monica Olson" was seen today for diabetic foot exam.    Diagnoses and all orders for this visit:    Diabetic polyneuropathy associated with type 2 diabetes mellitus    Type 2 diabetes mellitus without complication, without long-term current use of insulin  -     Ambulatory referral/consult to Podiatry    Tinea pedis of both feet    Other orders  -     ketoconazole (NIZORAL) 2 % cream; Apply topically once daily.      I counseled the patient on her conditions, their implications and medical management.      DM foot exam    Rx antifungal   - Shoe inspection. Diabetic Foot Education. Patient reminded of the importance of good nutrition and blood sugar control to help prevent podiatric complications of diabetes. Patient instructed on proper foot hygeine. We discussed wearing proper shoe gear, daily foot inspections, never walking without protective shoe gear, never putting sharp instruments to feet, routine podiatric nail visits every 2-3 months.            Follow up in about 1 year (around 10/4/2025).     "

## 2024-10-08 ENCOUNTER — OFFICE VISIT (OUTPATIENT)
Dept: INTERNAL MEDICINE | Facility: CLINIC | Age: 64
End: 2024-10-08
Payer: MEDICARE

## 2024-10-08 VITALS
WEIGHT: 180.13 LBS | BODY MASS INDEX: 31.92 KG/M2 | HEART RATE: 76 BPM | OXYGEN SATURATION: 99 % | HEIGHT: 63 IN | SYSTOLIC BLOOD PRESSURE: 130 MMHG | DIASTOLIC BLOOD PRESSURE: 76 MMHG

## 2024-10-08 DIAGNOSIS — R74.8 ELEVATED LIPASE: ICD-10-CM

## 2024-10-08 DIAGNOSIS — R14.0 ABDOMINAL BLOATING: Primary | ICD-10-CM

## 2024-10-08 PROCEDURE — 99214 OFFICE O/P EST MOD 30 MIN: CPT | Mod: HCNC,S$GLB,, | Performed by: PHYSICIAN ASSISTANT

## 2024-10-08 PROCEDURE — 3075F SYST BP GE 130 - 139MM HG: CPT | Mod: HCNC,CPTII,S$GLB, | Performed by: PHYSICIAN ASSISTANT

## 2024-10-08 PROCEDURE — 3066F NEPHROPATHY DOC TX: CPT | Mod: HCNC,CPTII,S$GLB, | Performed by: PHYSICIAN ASSISTANT

## 2024-10-08 PROCEDURE — 1159F MED LIST DOCD IN RCRD: CPT | Mod: HCNC,CPTII,S$GLB, | Performed by: PHYSICIAN ASSISTANT

## 2024-10-08 PROCEDURE — 1101F PT FALLS ASSESS-DOCD LE1/YR: CPT | Mod: HCNC,CPTII,S$GLB, | Performed by: PHYSICIAN ASSISTANT

## 2024-10-08 PROCEDURE — 3008F BODY MASS INDEX DOCD: CPT | Mod: HCNC,CPTII,S$GLB, | Performed by: PHYSICIAN ASSISTANT

## 2024-10-08 PROCEDURE — 3288F FALL RISK ASSESSMENT DOCD: CPT | Mod: HCNC,CPTII,S$GLB, | Performed by: PHYSICIAN ASSISTANT

## 2024-10-08 PROCEDURE — 3061F NEG MICROALBUMINURIA REV: CPT | Mod: HCNC,CPTII,S$GLB, | Performed by: PHYSICIAN ASSISTANT

## 2024-10-08 PROCEDURE — 99999 PR PBB SHADOW E&M-EST. PATIENT-LVL V: CPT | Mod: PBBFAC,HCNC,, | Performed by: PHYSICIAN ASSISTANT

## 2024-10-08 PROCEDURE — 3078F DIAST BP <80 MM HG: CPT | Mod: HCNC,CPTII,S$GLB, | Performed by: PHYSICIAN ASSISTANT

## 2024-10-08 PROCEDURE — 3052F HG A1C>EQUAL 8.0%<EQUAL 9.0%: CPT | Mod: HCNC,CPTII,S$GLB, | Performed by: PHYSICIAN ASSISTANT

## 2024-10-08 NOTE — PATIENT INSTRUCTIONS
According to your blood work you do not have Celiac disease however knowing more about gluten intolerance and Celiac disease may help.   I have attached some educational documents as per your request in clinic today.   -ACM

## 2024-10-08 NOTE — PROGRESS NOTES
INTERNAL MEDICINE URGENT VISIT NOTE    CHIEF COMPLAINT     Chief Complaint   Patient presents with    Follow-up     Lab work       HPI     Monica Knight is a 64 y.o. female who presents for an urgent visit today.    PCP is Shirley Cantu MD, patient is new to me.     Patient presents for results review. She had an elevated lipase found at GI visit. She was referred to the ER for treatment of acute pancreatitis but patient admits that she declined to go. She has had decreased appetite and admits that she is feeling much better and no longer has upper abdominal pain. She is still however having bloating - which she reports is not new for her. She is unclear if she wad consumed alcohol prior to previous blood draw - she can attest to no alcohol before subsequent blood draw -which now shows a negative Lipase.       Past Medical History:  Past Medical History:   Diagnosis Date    Asthma     Diabetes mellitus        Home Medications:  Prior to Admission medications    Medication Sig Start Date End Date Taking? Authorizing Provider   albuterol (PROVENTIL/VENTOLIN HFA) 90 mcg/actuation inhaler Inhale 2 puffs into the lungs every 6 (six) hours as needed for Wheezing. Rescue 8/2/24   Shirley Cantu MD   blood sugar diagnostic (TRUE METRIX GLUCOSE TEST STRIP) Strp Use as directed to check blood sugar once daily. 1/17/24   Kelsey Zhao NP   blood-glucose meter (TRUE METRIX AIR GLUCOSE METER) Misc Use as directed to check blood sugar once daily. 1/17/24   Kelsey Zhao, NP   cetirizine (ZYRTEC) 10 MG tablet Take 1 tablet (10 mg total) by mouth once daily. 1/10/24 1/9/25  Kelsey Zhao NP   glipiZIDE (GLUCOTROL) 5 MG tablet Take 1 tablet (5 mg total) by mouth daily with breakfast. 5/20/24 5/20/25  Dipp, Lulu ARRINGTON MD   HYDROcodone-acetaminophen (NORCO) 5-325 mg per tablet Take 1 tablet by mouth every 6 (six) hours as needed for Pain.    Provider, Historical   ibuprofen (ADVIL,MOTRIN) 800 MG tablet Take 800 mg  by mouth 2 (two) times daily. 11/3/22   Provider, Historical   ketoconazole (NIZORAL) 2 % cream Apply topically once daily. 10/4/24   Kelsey Palumbo DPM   lancets (TRUEPLUS LANCETS) 33 gauge Misc Use as directed to check blood sugar once daily. 1/17/24   Kelsey Zhao NP   LIDOcaine (LIDODERM) 5 % Place 1 patch onto the skin once daily. Remove & Discard patch within 12 hours or as directed by MD 7/24/24   Roxann Randle MD   lisinopriL-hydrochlorothiazide (PRINZIDE,ZESTORETIC) 20-12.5 mg per tablet lisinopril 20 mg-hydrochlorothiazide 12.5 mg tablet 10/25/21 8/2/24  Endy Haddad MD   metFORMIN (GLUCOPHAGE) 1000 MG tablet Take 1 tablet (1,000 mg total) by mouth 2 (two) times daily with meals. 4/2/24 4/2/25  Shirley Cantu MD   methocarbamoL (ROBAXIN) 750 MG Tab Take 1 tablet (750 mg total) by mouth 3 (three) times daily as needed (muscular pain). 4/17/24   Roxann Randle MD   rosuvastatin (CRESTOR) 20 MG tablet Take 1 tablet (20 mg total) by mouth once daily. 1/17/24 1/16/25  Kelsey Zhao NP   tirzepatide 10 mg/0.5 mL PnIj Inject 10 mg into the skin every 7 days. 8/2/24   Shirley Cantu MD   tirzepatide 7.5 mg/0.5 mL PnIj Inject 7.5 mg into the skin every 7 days. 5/27/24   Lulu Disla MD       Review of Systems:  Review of Systems   Constitutional:  Negative for chills and fever.   HENT:  Negative for sore throat and trouble swallowing.    Eyes:  Negative for visual disturbance.   Respiratory:  Negative for cough and shortness of breath.    Cardiovascular:  Negative for chest pain.   Gastrointestinal:  Positive for abdominal pain. Negative for constipation, diarrhea, nausea and vomiting.        Upper abdominal bloating    Genitourinary:  Negative for dysuria and flank pain.   Musculoskeletal:  Negative for back pain, neck pain and neck stiffness.   Skin:  Negative for rash.   Neurological:  Negative for dizziness, syncope, weakness and headaches.   Psychiatric/Behavioral:  Negative for  "confusion.        Health Maintainence:   Immunizations:  Health Maintenance         Date Due Completion Date    Hepatitis C Screening Never done ---    Pneumococcal Vaccines (Age 65+) (1 of 2 - PCV) Never done ---    HIV Screening Never done ---    TETANUS VACCINE Never done ---    Mammogram Never done ---    Colorectal Cancer Screening Never done ---    Shingles Vaccine (1 of 2) Never done ---    RSV Vaccine (Age 60+ and Pregnant patients) (1 - Risk 60-74 years 1-dose series) Never done ---    Influenza Vaccine (1) 09/01/2024 12/26/2023    COVID-19 Vaccine (2 - 2024-25 season) 09/01/2024 12/26/2023    Hemoglobin A1c 11/02/2024 8/2/2024    Diabetes Urine Screening 01/10/2025 1/10/2024    Lipid Panel 01/10/2025 1/10/2024    Eye Exam 06/22/2025 6/22/2024    Foot Exam 10/04/2025 10/4/2024    DEXA Scan 09/17/2028 9/17/2024             PHYSICAL EXAM     /76 (BP Location: Left arm, Patient Position: Sitting)   Pulse 76   Ht 5' 3" (1.6 m)   Wt 81.7 kg (180 lb 1.9 oz)   SpO2 99%   BMI 31.91 kg/m²     Physical Exam  Vitals and nursing note reviewed.   Constitutional:       Appearance: Normal appearance.      Comments: Healthy appearing female in NAD or apparent pain. She makes good eye contact, speaks in clear full sentences and ambulates with ease.        HENT:      Head: Normocephalic and atraumatic.      Nose: Nose normal.      Mouth/Throat:      Pharynx: Oropharynx is clear.   Eyes:      Conjunctiva/sclera: Conjunctivae normal.   Cardiovascular:      Rate and Rhythm: Normal rate and regular rhythm.      Pulses: Normal pulses.   Pulmonary:      Effort: No respiratory distress.   Abdominal:      Tenderness: There is no abdominal tenderness.      Comments: Benign abdomen on exam    Musculoskeletal:         General: Normal range of motion.      Cervical back: No rigidity.   Skin:     General: Skin is warm and dry.      Capillary Refill: Capillary refill takes less than 2 seconds.      Findings: No rash. "   Neurological:      General: No focal deficit present.      Mental Status: She is alert.      Gait: Gait normal.   Psychiatric:         Mood and Affect: Mood normal.         LABS     Lab Results   Component Value Date    HGBA1C 8.2 (H) 08/02/2024     CMP  Sodium   Date Value Ref Range Status   10/04/2024 140 136 - 145 mmol/L Final     Potassium   Date Value Ref Range Status   10/04/2024 4.2 3.5 - 5.1 mmol/L Final     Chloride   Date Value Ref Range Status   10/04/2024 106 95 - 110 mmol/L Final     CO2   Date Value Ref Range Status   10/04/2024 30 (H) 23 - 29 mmol/L Final     Glucose   Date Value Ref Range Status   10/04/2024 179 (H) 70 - 110 mg/dL Final     BUN   Date Value Ref Range Status   10/04/2024 11 8 - 23 mg/dL Final     Creatinine   Date Value Ref Range Status   10/04/2024 0.9 0.5 - 1.4 mg/dL Final     Calcium   Date Value Ref Range Status   10/04/2024 9.9 8.7 - 10.5 mg/dL Final     Total Protein   Date Value Ref Range Status   10/04/2024 7.1 6.0 - 8.4 g/dL Final     Albumin   Date Value Ref Range Status   10/04/2024 3.8 3.5 - 5.2 g/dL Final     Total Bilirubin   Date Value Ref Range Status   10/04/2024 0.4 0.1 - 1.0 mg/dL Final     Comment:     For infants and newborns, interpretation of results should be based  on gestational age, weight and in agreement with clinical  observations.    Premature Infant recommended reference ranges:  Up to 24 hours.............<8.0 mg/dL  Up to 48 hours............<12.0 mg/dL  3-5 days..................<15.0 mg/dL  6-29 days.................<15.0 mg/dL       Alkaline Phosphatase   Date Value Ref Range Status   10/04/2024 73 55 - 135 U/L Final     AST   Date Value Ref Range Status   10/04/2024 16 10 - 40 U/L Final     ALT   Date Value Ref Range Status   10/04/2024 11 10 - 44 U/L Final     Anion Gap   Date Value Ref Range Status   10/04/2024 4 (L) 8 - 16 mmol/L Final     eGFR if    Date Value Ref Range Status   10/25/2021 >60.0 >60 mL/min/1.73 m^2 Final  "    eGFR if non    Date Value Ref Range Status   10/25/2021 >60.0 >60 mL/min/1.73 m^2 Final     Comment:     Calculation used to obtain the estimated glomerular filtration  rate (eGFR) is the CKD-EPI equation.        Lab Results   Component Value Date    WBC 4.77 01/10/2024    HGB 12.9 01/10/2024    HCT 39.2 01/10/2024    MCV 94 01/10/2024     01/10/2024     Lab Results   Component Value Date    CHOL 215 (H) 01/10/2024    CHOL 230 (H) 10/25/2021     Lab Results   Component Value Date    HDL 71 01/10/2024    HDL 75 10/25/2021     Lab Results   Component Value Date    LDLCALC 128.2 01/10/2024    LDLCALC 139.4 10/25/2021     Lab Results   Component Value Date    TRIG 79 01/10/2024    TRIG 78 10/25/2021     Lab Results   Component Value Date    CHOLHDL 33.0 01/10/2024    CHOLHDL 32.6 10/25/2021     Lab Results   Component Value Date    TSH 0.845 01/10/2024       ASSESSMENT/PLAN     Monica Knight is a 64 y.o. female     Monica Olson" was seen today for follow-up. She is still taking Mounjaro and declines stopping this medication. She has not had any alcohol in about 2 weeks. She admits she is a social drinking only. No nausea or vomiting. She does have a decreased appetite.     Diagnoses and all orders for this visit:    Abdominal bloating   -unclear etiology, celiac testing was negative   -follow up with EGD in two weeks as planned.     Elevated lipase   -resolved to normal   -stop mounjaro   -stop alcohol        Patient was counseled on when and how to seek emergent care.       Maye Diallo PA-C   Department of Internal Medicine - [facility]   7:55 AM     "

## 2024-10-09 ENCOUNTER — TELEPHONE (OUTPATIENT)
Dept: PAIN MEDICINE | Facility: CLINIC | Age: 64
End: 2024-10-09
Payer: MEDICARE

## 2024-10-14 ENCOUNTER — HOSPITAL ENCOUNTER (OUTPATIENT)
Dept: RADIOLOGY | Facility: HOSPITAL | Age: 64
Discharge: HOME | End: 2024-10-14
Attending: INTERNAL MEDICINE
Payer: MEDICARE

## 2024-10-14 VITALS — WEIGHT: 180 LBS | HEIGHT: 63 IN | BODY MASS INDEX: 31.89 KG/M2

## 2024-10-14 DIAGNOSIS — Z12.31 SCREENING MAMMOGRAM, ENCOUNTER FOR: ICD-10-CM

## 2024-10-14 PROCEDURE — 77067 SCR MAMMO BI INCL CAD: CPT | Mod: TC,HCNC

## 2024-10-25 ENCOUNTER — ANESTHESIA (OUTPATIENT)
Dept: ENDOSCOPY | Facility: HOSPITAL | Age: 64
End: 2024-10-25
Payer: MEDICARE

## 2024-10-25 ENCOUNTER — ANESTHESIA EVENT (OUTPATIENT)
Dept: ENDOSCOPY | Facility: HOSPITAL | Age: 64
End: 2024-10-25
Payer: MEDICARE

## 2024-10-25 ENCOUNTER — HOSPITAL ENCOUNTER (OUTPATIENT)
Facility: HOSPITAL | Age: 64
Discharge: HOME | End: 2024-10-25
Attending: INTERNAL MEDICINE | Admitting: INTERNAL MEDICINE
Payer: MEDICARE

## 2024-10-25 DIAGNOSIS — K21.9 GERD (GASTROESOPHAGEAL REFLUX DISEASE): ICD-10-CM

## 2024-10-25 DIAGNOSIS — K29.70 GASTRITIS, PRESENCE OF BLEEDING UNSPECIFIED, UNSPECIFIED CHRONICITY, UNSPECIFIED GASTRITIS TYPE: Primary | ICD-10-CM

## 2024-10-25 DIAGNOSIS — K44.9 HIATAL HERNIA: ICD-10-CM

## 2024-10-25 PROCEDURE — 27201012 HC FORCEPS, HOT/COLD, DISP: Performed by: INTERNAL MEDICINE

## 2024-10-25 PROCEDURE — 43239 EGD BIOPSY SINGLE/MULTIPLE: CPT | Mod: ,,, | Performed by: INTERNAL MEDICINE

## 2024-10-25 PROCEDURE — 88342 IMHCHEM/IMCYTCHM 1ST ANTB: CPT | Mod: TC | Performed by: PATHOLOGY

## 2024-10-25 PROCEDURE — 37000008 HC ANESTHESIA 1ST 15 MINUTES: Performed by: INTERNAL MEDICINE

## 2024-10-25 PROCEDURE — 43239 EGD BIOPSY SINGLE/MULTIPLE: CPT | Performed by: INTERNAL MEDICINE

## 2024-10-25 PROCEDURE — 63600175 PHARM REV CODE 636 W HCPCS: Performed by: NURSE ANESTHETIST, CERTIFIED REGISTERED

## 2024-10-25 PROCEDURE — 88305 TISSUE EXAM BY PATHOLOGIST: CPT | Mod: TC | Performed by: PATHOLOGY

## 2024-10-25 RX ORDER — PROPOFOL 10 MG/ML
VIAL (ML) INTRAVENOUS
Status: DISCONTINUED | OUTPATIENT
Start: 2024-10-25 | End: 2024-10-25

## 2024-10-25 RX ORDER — PANTOPRAZOLE SODIUM 40 MG/1
40 TABLET, DELAYED RELEASE ORAL DAILY
Qty: 90 TABLET | Refills: 3 | Status: SHIPPED | OUTPATIENT
Start: 2024-10-25 | End: 2025-10-25

## 2024-10-25 RX ORDER — LIDOCAINE HYDROCHLORIDE 20 MG/ML
INJECTION INTRAVENOUS
Status: DISCONTINUED | OUTPATIENT
Start: 2024-10-25 | End: 2024-10-25

## 2024-10-25 RX ADMIN — PROPOFOL 100 MG: 10 INJECTION, EMULSION INTRAVENOUS at 01:10

## 2024-10-25 RX ADMIN — LIDOCAINE HYDROCHLORIDE 100 MG: 20 INJECTION, SOLUTION INTRAVENOUS at 01:10

## 2024-10-25 NOTE — ANESTHESIA PREPROCEDURE EVALUATION
10/25/2024  Monica Knight is a 64 y.o., female.      Pre-op Assessment    I have reviewed the Patient Summary Reports.     I have reviewed the Nursing Notes. I have reviewed the NPO Status.   I have reviewed the Medications.     Review of Systems  Cardiovascular:     Hypertension           hyperlipidemia                               Pulmonary:    Asthma                    Hepatic/GI:     GERD                Musculoskeletal:  Arthritis               Endocrine:  Diabetes               Physical Exam  General: Well nourished    Airway:  Mallampati: II   Neck ROM: Normal ROM    Dental:  Intact        Anesthesia Plan  Type of Anesthesia, risks & benefits discussed:    Anesthesia Type: Gen Natural Airway  Intra-op Monitoring Plan: Standard ASA Monitors  Induction:  IV  Informed Consent: Informed consent signed with the Patient and all parties understand the risks and agree with anesthesia plan.  All questions answered.   ASA Score: 2    Ready For Surgery From Anesthesia Perspective.     .

## 2024-10-25 NOTE — ANESTHESIA POSTPROCEDURE EVALUATION
Anesthesia Post Evaluation    Patient: Monica Knight    Procedure(s) Performed: Procedure(s) (LRB):  EGD (ESOPHAGOGASTRODUODENOSCOPY) (N/A)    Final Anesthesia Type: general      Patient location during evaluation: PACU  Patient participation: Yes- Able to Participate  Level of consciousness: sedated and awake  Post-procedure vital signs: reviewed and stable  Pain management: adequate  Airway patency: patent    PONV status at discharge: No PONV  Anesthetic complications: no      Cardiovascular status: blood pressure returned to baseline and hemodynamically stable  Respiratory status: spontaneous ventilation  Hydration status: euvolemic  Follow-up not needed.              Vitals Value Taken Time   /68 10/25/24 1408   Temp 36.5 °C (97.7 °F) 10/25/24 1337   Pulse 68 10/25/24 1413   Resp 28 10/25/24 1413   SpO2 99 % 10/25/24 1413   Vitals shown include unfiled device data.      Event Time   Out of Recovery 14:13:50         Pain/Robert Score: Robert Score: 10 (10/25/2024  2:06 PM)

## 2024-10-25 NOTE — TRANSFER OF CARE
"Anesthesia Transfer of Care Note    Patient: Monica Knight    Procedure(s) Performed: Procedure(s) (LRB):  EGD (ESOPHAGOGASTRODUODENOSCOPY) (N/A)    Patient location: GI    Anesthesia Type: general    Transport from OR: Transported from OR on room air with adequate spontaneous ventilation    Post pain: adequate analgesia    Post assessment: no apparent anesthetic complications    Post vital signs: stable    Level of consciousness: sedated    Nausea/Vomiting: no nausea/vomiting    Complications: none    Transfer of care protocol was followed      Last vitals: Visit Vitals  /70 (BP Location: Left arm, Patient Position: Lying)   Pulse 68   Temp 36.7 °C (98.1 °F) (Skin)   Resp 16   Ht 5' 3" (1.6 m)   Wt 82.1 kg (181 lb)   SpO2 98%   Breastfeeding No   BMI 32.06 kg/m²     "

## 2024-10-28 VITALS
WEIGHT: 181 LBS | RESPIRATION RATE: 23 BRPM | BODY MASS INDEX: 32.07 KG/M2 | SYSTOLIC BLOOD PRESSURE: 130 MMHG | TEMPERATURE: 98 F | OXYGEN SATURATION: 99 % | DIASTOLIC BLOOD PRESSURE: 68 MMHG | HEART RATE: 70 BPM | HEIGHT: 63 IN

## 2024-11-05 ENCOUNTER — PROCEDURE VISIT (OUTPATIENT)
Dept: NEUROLOGY | Facility: CLINIC | Age: 64
End: 2024-11-05
Payer: MEDICARE

## 2024-11-05 DIAGNOSIS — M54.12 CERVICAL RADICULOPATHY: ICD-10-CM

## 2024-11-05 PROCEDURE — 95911 NRV CNDJ TEST 9-10 STUDIES: CPT | Mod: HCNC,S$GLB,, | Performed by: PHYSICAL MEDICINE & REHABILITATION

## 2024-11-05 PROCEDURE — 95886 MUSC TEST DONE W/N TEST COMP: CPT | Mod: HCNC,S$GLB,, | Performed by: PHYSICAL MEDICINE & REHABILITATION

## 2024-11-05 NOTE — PROCEDURES
Test Date:  2024    Patient: monica knight : 1960 Physician: Sriram Cueto D.O.   ID#: [id number] SEX: Female Ref. Phys: Sriram Cole DO     HPI: Monica Knight is a 64 y.o.female who presents for NCS/EMG to evaluate for CTS bilaterally vs cervical radiculopathy.     PROCEDURE:  Prior to the procedure, the procedure was discussed in detail with the patient.  All questions were answered, and verbal consent was obtained.  For nerve conduction studies, a combination of surface electrodes, bar electrodes, and/or ring electrodes were used as needed.  For needle EMG, each site was cleaned and prepped in usual fashion with an alcohol pad.  A monopolar needle (28G) was used.  There was no significant bleeding, and bandages were applied as needed.  The procedure was tolerated without adverse effect.  The patient was instructed on post-procedure care including ice if needed for 10-15 minutes up to 4 times/day for any sore muscles.  I discussed with the patient that the data would be reviewed and a report sent to the referring provider, where any follow up questions regarding next steps should be directed.        NCV & EMG Findings:  Evaluation of the left median motor and the right median motor nerves showed prolonged distal onset latency.  The left median sensory and the right median sensory nerves showed prolonged distal onset latency, prolonged distal peak latency, and decreased conduction velocity.  All remaining nerves (as indicated in the following tables) were within normal limits.  Needle evaluation of the right Abductor Pollicis Brevis and the left Abductor Pollicis Brevis muscles showed increased motor unit amplitude, increased motor unit duration, and diminished recruitment.  All remaining muscles (as indicated in the following table) showed no evidence of electrical instability.      IMPRESSIONS:  There is electrophysiologic evidence of chronic bilateral sensorimotor median  mononeuropathy across the wrist (I.e. Carpal tunnel syndrome).  There is no motor axonal loss.  There is no active denervation.  This is graded as Severe in severity on the right, and Moderate on the left.          ___________________________  Sriram Cueto D.O.        NCS+  Motor Nerve Results      Latency Amplitude F-Lat Segment Distance CV Comment   Site (ms) Norm (mV) Norm (ms)  (cm) (m/s) Norm    Left Median (APB)   Wrist *5.0  < 4.4 6.2  > 3.8         Elbow 9.1 - 5.9 -  Elbow-Wrist 23 56  > 51    Right Median (APB)   Wrist *7.5  < 4.4 5.3  > 3.8         Elbow 11.6 - 5.1 -  Elbow-Wrist 22 54  > 51    Left Ulnar (ADM)   Wrist 2.8  < 3.7 11.0  > 3.0         Bel Elbow 7.1 - 8.2 -  Bel Elbow-Wrist 26 60  > 52    Abv Elbow 8.8 - 8.0 -  Abv Elbow-Bel Elbow 10 59  > 43    Right Ulnar (ADM)   Wrist 3.2  < 3.7 10.7  > 3.0         Bel Elbow 7.6 - 8.7 -  Bel Elbow-Wrist 28 64  > 52    Abv Elbow 8.3 - 9.1 -  Abv Elbow-Bel Elbow 8 114  > 43      Sensory Nerve Results      Start Lat Latency (Peak) Amplitude (P-P) Segment Distance Start CV Comment   Site (ms) Norm (ms) (µV) Norm  (cm) (m/s) Norm    Left Median (Rec:Dig II)   Wrist *3.6  < 3.3 *4.5 48  > 8 Wrist-Dig II 14 *39  > 42    Right Median (Rec:Dig II)   Wrist *6.1  < 3.3 *7.4 8  > 8 Wrist-Dig II 14 *23  > 42    Left Ulnar (Rec:Dig V)   Wrist 2.6  < 3.1 3.4 27  > 4 Wrist-Dig V 14 54  > 45    Right Ulnar (Rec:Dig V)   Wrist 2.3  < 3.1 3.3 34  > 4 Wrist-Dig V 14 61  > 45    Left Radial (Rec:Wrist)   Forearm 1.13  < 2.2 1.65 37  > 11 Forearm-Wrist 10 88 -    Right Radial (Rec:Wrist)   Forearm 1.40  < 2.2 2.1 29  > 11 Forearm-Wrist 10 71 -      EMG+     Side Muscle Nerve Root Ins Act Fibs Psw Amp Dur Poly Recrt Int Pat Comment   Right Deltoid Axillary C5-C6 Nml Nml Nml Nml Nml 0 Nml Nml    Right Biceps Musculocut C5-C6 Nml Nml Nml Nml Nml 0 Nml Nml    Right Triceps Radial C6-C8 Nml Nml Nml Nml Nml 0 Nml Nml    Right Pronator Teres Median C6-C7 Nml Nml Nml Nml Nml 0  Nml Nml    Right APB Median C8-T1 Nml Nml Nml *Incr *>12ms 0 *Reduced Nml    Left Deltoid Axillary C5-C6 Nml Nml Nml Nml Nml 0 Nml Nml    Left Biceps Musculocut C5-C6 Nml Nml Nml Nml Nml 0 Nml Nml    Left Triceps Radial C6-C8 Nml Nml Nml Nml Nml 0 Nml Nml    Left Pronator Teres Median C6-C7 Nml Nml Nml Nml Nml 0 Nml Nml    Left APB Median C8-T1 Nml Nml Nml *Incr *>12ms 0 *Reduced Nml    Right Cervical Parasp (Lower) Rami C7-C8 Nml Nml Nml         Left Cervical Parasp (Lower) Rami C7-C8 Nml Nml Nml                 Waveforms:    Motor              Sensory

## 2024-11-07 ENCOUNTER — TELEPHONE (OUTPATIENT)
Dept: GASTROENTEROLOGY | Facility: CLINIC | Age: 64
End: 2024-11-07
Payer: MEDICARE

## 2024-11-07 NOTE — TELEPHONE ENCOUNTER
Patient states she has only been on the Protonix for less than 3 weeks she will give it another week and call back if still not helping and schedule follow up with NP.

## 2024-11-07 NOTE — TELEPHONE ENCOUNTER
----- Message from Yash Rosado MD sent at 11/6/2024  7:25 PM CST -----  Contact: [facility]  Office follow up  ----- Message -----  From: Diomedes Bowles  Sent: 11/6/2024   2:50 PM CST  To: Yash Rosado MD    Type: Needs Medical Advice  Who Called:  PT    Best Call Back Number: [phone]   Additional Information: PT would like a call from nurse to discuss pantoprazole (PROTONIX) 40 MG tablet as she said its not working.

## 2024-11-12 ENCOUNTER — HOSPITAL ENCOUNTER (OUTPATIENT)
Dept: RADIOLOGY | Facility: HOSPITAL | Age: 64
Discharge: HOME | End: 2024-11-12
Attending: STUDENT IN AN ORGANIZED HEALTH CARE EDUCATION/TRAINING PROGRAM
Payer: MEDICARE

## 2024-11-12 ENCOUNTER — OFFICE VISIT (OUTPATIENT)
Dept: NEUROSURGERY | Facility: CLINIC | Age: 64
End: 2024-11-12
Payer: MEDICARE

## 2024-11-12 DIAGNOSIS — M54.12 CERVICAL RADICULOPATHY: ICD-10-CM

## 2024-11-12 DIAGNOSIS — G56.03 CARPAL TUNNEL SYNDROME ON BOTH SIDES: ICD-10-CM

## 2024-11-12 DIAGNOSIS — M48.061 SPINAL STENOSIS OF LUMBAR REGION WITHOUT NEUROGENIC CLAUDICATION: Primary | ICD-10-CM

## 2024-11-12 PROCEDURE — 3066F NEPHROPATHY DOC TX: CPT | Mod: HCNC,CPTII,S$GLB, | Performed by: STUDENT IN AN ORGANIZED HEALTH CARE EDUCATION/TRAINING PROGRAM

## 2024-11-12 PROCEDURE — 72125 CT NECK SPINE W/O DYE: CPT | Mod: 26,HCNC,, | Performed by: RADIOLOGY

## 2024-11-12 PROCEDURE — 99214 OFFICE O/P EST MOD 30 MIN: CPT | Mod: HCNC,S$GLB,, | Performed by: STUDENT IN AN ORGANIZED HEALTH CARE EDUCATION/TRAINING PROGRAM

## 2024-11-12 PROCEDURE — 3288F FALL RISK ASSESSMENT DOCD: CPT | Mod: HCNC,CPTII,S$GLB, | Performed by: STUDENT IN AN ORGANIZED HEALTH CARE EDUCATION/TRAINING PROGRAM

## 2024-11-12 PROCEDURE — 3061F NEG MICROALBUMINURIA REV: CPT | Mod: HCNC,CPTII,S$GLB, | Performed by: STUDENT IN AN ORGANIZED HEALTH CARE EDUCATION/TRAINING PROGRAM

## 2024-11-12 PROCEDURE — 1159F MED LIST DOCD IN RCRD: CPT | Mod: HCNC,CPTII,S$GLB, | Performed by: STUDENT IN AN ORGANIZED HEALTH CARE EDUCATION/TRAINING PROGRAM

## 2024-11-12 PROCEDURE — 72125 CT NECK SPINE W/O DYE: CPT | Mod: TC,HCNC

## 2024-11-12 PROCEDURE — 1101F PT FALLS ASSESS-DOCD LE1/YR: CPT | Mod: HCNC,CPTII,S$GLB, | Performed by: STUDENT IN AN ORGANIZED HEALTH CARE EDUCATION/TRAINING PROGRAM

## 2024-11-12 PROCEDURE — 3052F HG A1C>EQUAL 8.0%<EQUAL 9.0%: CPT | Mod: HCNC,CPTII,S$GLB, | Performed by: STUDENT IN AN ORGANIZED HEALTH CARE EDUCATION/TRAINING PROGRAM

## 2024-11-12 PROCEDURE — 99999 PR PBB SHADOW E&M-EST. PATIENT-LVL III: CPT | Mod: PBBFAC,HCNC,, | Performed by: STUDENT IN AN ORGANIZED HEALTH CARE EDUCATION/TRAINING PROGRAM

## 2024-11-12 NOTE — PROGRESS NOTES
Neurosurgery  Established Patient    SUBJECTIVE:     History of Present Illness:  61 F with long standing back and RLE radicular pain presents to establish care from [place]. Pt endorses back and RLE radicular pain. RLE pain is worse than her back pain. It radiates down back of leg into top of foot and sometimes into sole of foot. She has some left buttock pain but not as bad as right leg pain. Standing and walking makes the pain worse in back and leg. It is relieved when she flexes forward. She has had no prior spine surgery. She hasn't tried PT recently and had DARNELL in august with mild relief of pain.      Interval fu 8/26/24:  Pt has been seeing pain mgmt for cervical and lumbar injections.  She had a recent L4/5 TFESI bilateral which has provided her relief of her pain.  Today she endorses worsened bilateral hand numbness primarily in the first 3 digits which is worse at night.  Shaking out her hands can improve these parasthesias.  She has difficulty opening jars but denies difficulty with buttons.  She feels that her gait has become more unstable.    Interval fu 11/12/24:  Pt presents in fu after EMG.  She feels that her back and leg pain have returned but isn't interested in seeing pain mgmt anymore.  Her hand numbness persists bilaterally right worse than left and is located in the first 3 digits of her hands.    Review of patient's allergies indicates:   Allergen Reactions    Peanut Swelling     Throat swells and gets hives  Throat swells and gets hives  Throat swells and gets hives         Current Outpatient Medications   Medication Sig Dispense Refill    albuterol (PROVENTIL/VENTOLIN HFA) 90 mcg/actuation inhaler Inhale 2 puffs into the lungs every 6 (six) hours as needed for Wheezing. Rescue 18 g 4    blood sugar diagnostic (TRUE METRIX GLUCOSE TEST STRIP) Strp Use as directed to check blood sugar once daily. 100 strip 2    blood-glucose meter (TRUE METRIX AIR GLUCOSE METER) Misc Use as directed to check blood  sugar once daily. 1 each 0    cetirizine (ZYRTEC) 10 MG tablet Take 1 tablet (10 mg total) by mouth once daily. 90 tablet 1    glipiZIDE (GLUCOTROL) 5 MG tablet Take 1 tablet (5 mg total) by mouth daily with breakfast. 30 tablet 11    HYDROcodone-acetaminophen (NORCO) 5-325 mg per tablet Take 1 tablet by mouth every 6 (six) hours as needed for Pain.      ibuprofen (ADVIL,MOTRIN) 800 MG tablet Take 800 mg by mouth 2 (two) times daily.      ketoconazole (NIZORAL) 2 % cream Apply topically once daily. 30 g 1    lancets (TRUEPLUS LANCETS) 33 gauge Misc Use as directed to check blood sugar once daily. 100 each 2    LIDOcaine (LIDODERM) 5 % Place 1 patch onto the skin once daily. Remove & Discard patch within 12 hours or as directed by MD 30 patch 2    metFORMIN (GLUCOPHAGE) 1000 MG tablet Take 1 tablet (1,000 mg total) by mouth 2 (two) times daily with meals. 180 tablet 1    methocarbamoL (ROBAXIN) 750 MG Tab Take 1 tablet (750 mg total) by mouth 3 (three) times daily as needed (muscular pain). 90 tablet 2    pantoprazole (PROTONIX) 40 MG tablet Take 1 tablet (40 mg total) by mouth once daily. 90 tablet 3    rosuvastatin (CRESTOR) 20 MG tablet Take 1 tablet (20 mg total) by mouth once daily. 90 tablet 3    tirzepatide 10 mg/0.5 mL PnIj Inject 10 mg into the skin every 7 days. 12 Pen 3    lisinopriL-hydrochlorothiazide (PRINZIDE,ZESTORETIC) 20-12.5 mg per tablet lisinopril 20 mg-hydrochlorothiazide 12.5 mg tablet 90 tablet 3     No current facility-administered medications for this visit.       Past Medical History:   Diagnosis Date    Arthritis     Asthma     Diabetes mellitus     Hypertension      Past Surgical History:   Procedure Laterality Date    COLONOSCOPY      2022 in [place]    EPIDURAL STEROID INJECTION N/A 02/22/2024    Procedure: CERVICAL C7/T1 DARNELL;  Surgeon: Roxann Randle MD;  Location[facility];  Service: Pain Management;  Laterality: N/A;  [phone]    ESOPHAGOGASTRODUODENOSCOPY N/A 10/25/2024     Procedure: EGD (ESOPHAGOGASTRODUODENOSCOPY);  Surgeon: Yash Rosado MD;  Location: [facility];  Service: Endoscopy;  Laterality: N/A;    INJECTION OF JOINT Bilateral 03/28/2024    Procedure: INJECTION, JOINT BILATERAL SI AND BILATERAL GTB;  Surgeon: Roxann Randle MD;  Location[facility];  Service: Pain Management;  Laterality: Bilateral;  [phone]  2 WK F/U SHIRA  *SCHEDULE AFTER 3/21*    TRANSFORAMINAL EPIDURAL INJECTION OF STEROID Bilateral 08/22/2024    Procedure: LUMBAR TRANSFORAMINAL BILATERAL L4/5;  Surgeon: Roxann Randle MD;  [facility];  Service: Pain Management;  Laterality: Bilateral;  [phone]  2 WK F/U EBEN     Family History    None       Social History     Socioeconomic History    Marital status: Single   Tobacco Use    Smoking status: Never    Smokeless tobacco: Never   Substance and Sexual Activity    Alcohol use: Yes     Comment: occ    Drug use: Never    Sexual activity: Yes     Social Drivers of Health     Financial Resource Strain: Medium Risk (8/22/2024)    Overall Financial Resource Strain (CARDIA)     Difficulty of Paying Living Expenses: Somewhat hard   Food Insecurity: Food Insecurity Present (8/22/2024)    Hunger Vital Sign     Worried About Running Out of Food in the Last Year: Often true     Ran Out of Food in the Last Year: Sometimes true   Physical Activity: Insufficiently Active (8/22/2024)    Exercise Vital Sign     Days of Exercise per Week: 2 days     Minutes of Exercise per Session: 30 min   Stress: No Stress Concern Present (8/22/2024)    [employer] [place] [employer] - Occupational Stress Questionnaire     Feeling of Stress : Not at all   Housing Stability: High Risk (8/22/2024)    Housing Stability Vital Sign     Unable to Pay for Housing in the Last Year: Yes       Review of Systems  14 point ROS was negative    OBJECTIVE:     Vital Signs  Pain Score:   5  There is no height or weight on file to calculate BMI.    Neurosurgery Physical  Exam  Constitutional: She appears well-developed and well-nourished.      Eyes: Pupils are equal, round, and reactive to light.      Cardiovascular: Normal rate and regular rhythm.      Abdominal: Soft.      Psych/Behavior: She is alert. She is oriented to person, place, and time. She has a normal mood and affect.      Musculoskeletal: Gait is abnormal.        Neck: Range of motion is limited.        Back: Range of motion is limited.        Right Upper Extremities: Muscle strength is 5/5.        Left Upper Extremities: Muscle strength is 5/5.       Right Lower Extremities: Muscle strength is 5/5.        Left Lower Extremities: Muscle strength is 5/5.      Neurological:        Coordination: She has a normal Romberg Test.        Sensory: There is no sensory deficit in the trunk. There is no sensory deficit in the extremities.        DTRs: DTRs are DTRS NORMAL AND SYMMETRICnormal and symmetric.        Cranial nerves: Cranial nerve(s) II, III, IV, V, VI, VII, VIII, IX, X, XI and XII are intact.      No nix's bilaterally     +tinel's at right wrist  +phalen's/reverse phalens at right wrist    Diagnostic Results:  CT c spine: focal kyphosis C4-6.  No opll.  EMG: bilateral carpal tunnel, severe on right and moderate on left.  Reviewed    MRI c spine: moderate to severe stenosis C5-7 without myelomalacia.  MRI L: stepwise anterolisthesis L3/4, 4/5.  Severe central stenosis L3-5.  Left greater than right moderate foraminal stenosis at L5/S1.  Flex/ex c: no instability  Flex/ex L: instability at L3/4, 4/5.    ASSESSMENT/PLAN:     64 F with known mutilevel degenerative lumbar stenosis and cervical stenosis who presents with worsened bilateral hand numbness/parasthesias predominantly in the first 3 fingers. Her updated imaging is described above and does show fairly significant stenosis from C5-7 however, the patient doesn't have any overt signs of cervical myelopathy on exam. Moreover, she does appear to have signs of  carpal tunnel syndrome based on her physical exam which has been confirmed on EMG.  I will plan to have her evaluated by ortho hand for this.  I discussed surgical treatment of her lumbar spine and have recommended a L3-pelvic decompression/fusion.  She would like to try PT again.  Will plan to see her in fu after PT.

## 2024-11-13 ENCOUNTER — TELEPHONE (OUTPATIENT)
Dept: RADIOLOGY | Facility: HOSPITAL | Age: 64
End: 2024-11-13
Payer: MEDICARE

## 2024-11-20 ENCOUNTER — TELEPHONE (OUTPATIENT)
Dept: GASTROENTEROLOGY | Facility: CLINIC | Age: 64
End: 2024-11-20
Payer: MEDICARE

## 2024-11-20 NOTE — TELEPHONE ENCOUNTER
Call placed to Ms. Mathew in regards to message received. She stated that she is still having a lot of gas so she doesn't think the pantoprazole is working. I advised her that the pantoprazole was prescribed for the gastritis found on her EGD. I informed her that would help with the inflammation and reflux. She verbalized understanding. I advised that she could try gas-x or phazyme OTC for the gas relief. She verbalized understanding. No further issues noted.

## 2024-11-20 NOTE — TELEPHONE ENCOUNTER
----- Message from Lucy sent at 11/20/2024  1:45 PM CST -----  Regarding: appointment  Contact: [phone]  Type:  Needs Medical Advice    Who Called: Priyanka  Symptoms (please be specific): Stomach problems still exists   Medication does not seems to be working     pantoprazole (PROTONIX) 40 MG tablet 90 tablet 3 10/25/2024 10/25/2025 No  Sig - Route: Take 1 tablet (40 mg total) by mouth once daily. - Oral        Requesting an appointment   Would the patient rather a call back or a response via [facility]? Call back  Best Call Back Number: [phone]    Additional Information: Advise on medication

## 2024-11-29 ENCOUNTER — TELEPHONE (OUTPATIENT)
Dept: RADIOLOGY | Facility: HOSPITAL | Age: 64
End: 2024-11-29
Payer: MEDICARE

## 2024-12-02 RX ORDER — LISINOPRIL AND HYDROCHLOROTHIAZIDE 12.5; 2 MG/1; MG/1
TABLET ORAL
Qty: 90 TABLET | Refills: 0 | Status: SHIPPED | OUTPATIENT
Start: 2024-12-02 | End: 2027-12-03

## 2024-12-02 NOTE — TELEPHONE ENCOUNTER
No care due was identified.  [facility] Embedded Care Due Messages. Reference number: [id number].   12/02/2024 8:30:28 AM CST

## 2024-12-27 ENCOUNTER — TELEPHONE (OUTPATIENT)
Dept: GASTROENTEROLOGY | Facility: CLINIC | Age: 64
End: 2024-12-27
Payer: MEDICARE

## 2024-12-27 NOTE — TELEPHONE ENCOUNTER
----- Message from Lissy sent at 12/27/2024 11:32 AM CST -----  Type:  Needs Medical Advice    Who Called: pt    Symptoms (please be specific): gas, bloating. diarrhea     How long has patient had these symptoms:  since prior to egd in october    Pharmacy name and phone #:    [facility] #[id number] - [address] AT [address] [address]  Phone: [phone] Fax: [phone]    Would the patient rather a call back or a response via [facility]? Call back    Best Call Back Number: [phone]      Additional Information: pt says she has tried everything over the counter and nothing is working. She said gas X has even made it worse      Please call Back to advise. Thanks!

## 2024-12-28 DIAGNOSIS — E78.2 MIXED HYPERLIPIDEMIA: ICD-10-CM

## 2024-12-30 NOTE — TELEPHONE ENCOUNTER
Refill Routing Note   Medication(s) are not appropriate for processing by [facility] for the following reason(s):        Non-participating provider    [facility] action(s):  Route               Appointments  past 12m or future 3m with PCP    Date Provider   Last Visit   1/10/2024 Kelsey Zhao NP   Next Visit   Visit date not found Kelsey Zhao NP   ED visits in past 90 days: 0        Note composed:10:07 AM 12/30/2024

## 2024-12-31 RX ORDER — ROSUVASTATIN CALCIUM 20 MG/1
20 TABLET, COATED ORAL DAILY
Qty: 90 TABLET | Refills: 3 | Status: SHIPPED | OUTPATIENT
Start: 2024-12-31 | End: 2025-12-31

## 2025-01-07 ENCOUNTER — OFFICE VISIT (OUTPATIENT)
Dept: INTERNAL MEDICINE | Facility: CLINIC | Age: 65
End: 2025-01-07
Payer: MEDICARE

## 2025-01-07 ENCOUNTER — LAB VISIT (OUTPATIENT)
Dept: LAB | Facility: HOSPITAL | Age: 65
End: 2025-01-07
Attending: INTERNAL MEDICINE
Payer: MEDICARE

## 2025-01-07 DIAGNOSIS — E11.9 DIABETES MELLITUS WITHOUT COMPLICATION: ICD-10-CM

## 2025-01-07 DIAGNOSIS — E11.42 DIABETIC POLYNEUROPATHY ASSOCIATED WITH TYPE 2 DIABETES MELLITUS: ICD-10-CM

## 2025-01-07 DIAGNOSIS — I10 HYPERTENSION, UNSPECIFIED TYPE: Primary | ICD-10-CM

## 2025-01-07 DIAGNOSIS — I10 HYPERTENSION, UNSPECIFIED TYPE: ICD-10-CM

## 2025-01-07 DIAGNOSIS — R10.9 ABDOMINAL PAIN, UNSPECIFIED ABDOMINAL LOCATION: ICD-10-CM

## 2025-01-07 DIAGNOSIS — R07.9 CHEST PAIN, UNSPECIFIED TYPE: ICD-10-CM

## 2025-01-07 LAB
ALBUMIN SERPL BCP-MCNC: 4 G/DL (ref 3.5–5.2)
ALP SERPL-CCNC: 72 U/L (ref 40–150)
ALT SERPL W/O P-5'-P-CCNC: 14 U/L (ref 10–44)
ANION GAP SERPL CALC-SCNC: 8 MMOL/L (ref 8–16)
AST SERPL-CCNC: 19 U/L (ref 10–40)
BASOPHILS # BLD AUTO: 0.07 K/UL (ref 0–0.2)
BASOPHILS NFR BLD: 1.4 % (ref 0–1.9)
BILIRUB SERPL-MCNC: 0.4 MG/DL (ref 0.1–1)
BUN SERPL-MCNC: 9 MG/DL (ref 8–23)
CALCIUM SERPL-MCNC: 9.4 MG/DL (ref 8.7–10.5)
CHLORIDE SERPL-SCNC: 105 MMOL/L (ref 95–110)
CHOLEST SERPL-MCNC: 197 MG/DL (ref 120–199)
CHOLEST/HDLC SERPL: 3.2 {RATIO} (ref 2–5)
CO2 SERPL-SCNC: 26 MMOL/L (ref 23–29)
CREAT SERPL-MCNC: 0.8 MG/DL (ref 0.5–1.4)
DIFFERENTIAL METHOD BLD: ABNORMAL
EOSINOPHIL # BLD AUTO: 0.4 K/UL (ref 0–0.5)
EOSINOPHIL NFR BLD: 7.5 % (ref 0–8)
ERYTHROCYTE [DISTWIDTH] IN BLOOD BY AUTOMATED COUNT: 13.1 % (ref 11.5–14.5)
EST. GFR  (NO RACE VARIABLE): >60 ML/MIN/1.73 M^2
ESTIMATED AVG GLUCOSE: 192 MG/DL (ref 68–131)
GLUCOSE SERPL-MCNC: 142 MG/DL (ref 70–110)
HBA1C MFR BLD: 8.3 % (ref 4–5.6)
HCT VFR BLD AUTO: 43.3 % (ref 37–48.5)
HDLC SERPL-MCNC: 62 MG/DL (ref 40–75)
HDLC SERPL: 31.5 % (ref 20–50)
HGB BLD-MCNC: 13.6 G/DL (ref 12–16)
IMM GRANULOCYTES # BLD AUTO: 0 K/UL (ref 0–0.04)
IMM GRANULOCYTES NFR BLD AUTO: 0 % (ref 0–0.5)
LDLC SERPL CALC-MCNC: 120.6 MG/DL (ref 63–159)
LYMPHOCYTES # BLD AUTO: 2.1 K/UL (ref 1–4.8)
LYMPHOCYTES NFR BLD: 43.9 % (ref 18–48)
MCH RBC QN AUTO: 30 PG (ref 27–31)
MCHC RBC AUTO-ENTMCNC: 31.4 G/DL (ref 32–36)
MCV RBC AUTO: 95 FL (ref 82–98)
MONOCYTES # BLD AUTO: 0.3 K/UL (ref 0.3–1)
MONOCYTES NFR BLD: 5.8 % (ref 4–15)
NEUTROPHILS # BLD AUTO: 2 K/UL (ref 1.8–7.7)
NEUTROPHILS NFR BLD: 41.4 % (ref 38–73)
NONHDLC SERPL-MCNC: 135 MG/DL
NRBC BLD-RTO: 0 /100 WBC
PLATELET # BLD AUTO: 241 K/UL (ref 150–450)
PMV BLD AUTO: 10.9 FL (ref 9.2–12.9)
POTASSIUM SERPL-SCNC: 4.6 MMOL/L (ref 3.5–5.1)
PROT SERPL-MCNC: 7.8 G/DL (ref 6–8.4)
RBC # BLD AUTO: 4.54 M/UL (ref 4–5.4)
SODIUM SERPL-SCNC: 139 MMOL/L (ref 136–145)
TRIGL SERPL-MCNC: 72 MG/DL (ref 30–150)
WBC # BLD AUTO: 4.83 K/UL (ref 3.9–12.7)

## 2025-01-07 PROCEDURE — 1126F AMNT PAIN NOTED NONE PRSNT: CPT | Mod: HCNC,CPTII,S$GLB, | Performed by: INTERNAL MEDICINE

## 2025-01-07 PROCEDURE — 99999 PR PBB SHADOW E&M-EST. PATIENT-LVL V: CPT | Mod: PBBFAC,HCNC,, | Performed by: INTERNAL MEDICINE

## 2025-01-07 PROCEDURE — 85025 COMPLETE CBC W/AUTO DIFF WBC: CPT | Mod: HCNC | Performed by: INTERNAL MEDICINE

## 2025-01-07 PROCEDURE — 1101F PT FALLS ASSESS-DOCD LE1/YR: CPT | Mod: HCNC,CPTII,S$GLB, | Performed by: INTERNAL MEDICINE

## 2025-01-07 PROCEDURE — 3061F NEG MICROALBUMINURIA REV: CPT | Mod: HCNC,CPTII,S$GLB, | Performed by: INTERNAL MEDICINE

## 2025-01-07 PROCEDURE — 80053 COMPREHEN METABOLIC PANEL: CPT | Mod: HCNC | Performed by: INTERNAL MEDICINE

## 2025-01-07 PROCEDURE — 36415 COLL VENOUS BLD VENIPUNCTURE: CPT | Mod: HCNC | Performed by: INTERNAL MEDICINE

## 2025-01-07 PROCEDURE — 83036 HEMOGLOBIN GLYCOSYLATED A1C: CPT | Mod: HCNC | Performed by: INTERNAL MEDICINE

## 2025-01-07 PROCEDURE — 80061 LIPID PANEL: CPT | Mod: HCNC | Performed by: INTERNAL MEDICINE

## 2025-01-07 PROCEDURE — 3052F HG A1C>EQUAL 8.0%<EQUAL 9.0%: CPT | Mod: HCNC,CPTII,S$GLB, | Performed by: INTERNAL MEDICINE

## 2025-01-07 PROCEDURE — 3288F FALL RISK ASSESSMENT DOCD: CPT | Mod: HCNC,CPTII,S$GLB, | Performed by: INTERNAL MEDICINE

## 2025-01-07 PROCEDURE — 99214 OFFICE O/P EST MOD 30 MIN: CPT | Mod: HCNC,S$GLB,, | Performed by: INTERNAL MEDICINE

## 2025-01-07 PROCEDURE — 3008F BODY MASS INDEX DOCD: CPT | Mod: HCNC,CPTII,S$GLB, | Performed by: INTERNAL MEDICINE

## 2025-01-07 PROCEDURE — 1159F MED LIST DOCD IN RCRD: CPT | Mod: HCNC,CPTII,S$GLB, | Performed by: INTERNAL MEDICINE

## 2025-01-07 PROCEDURE — G2211 COMPLEX E/M VISIT ADD ON: HCPCS | Mod: HCNC,S$GLB,, | Performed by: INTERNAL MEDICINE

## 2025-01-07 PROCEDURE — 3066F NEPHROPATHY DOC TX: CPT | Mod: HCNC,CPTII,S$GLB, | Performed by: INTERNAL MEDICINE

## 2025-01-07 PROCEDURE — 3078F DIAST BP <80 MM HG: CPT | Mod: HCNC,CPTII,S$GLB, | Performed by: INTERNAL MEDICINE

## 2025-01-07 PROCEDURE — 3075F SYST BP GE 130 - 139MM HG: CPT | Mod: HCNC,CPTII,S$GLB, | Performed by: INTERNAL MEDICINE

## 2025-01-11 ENCOUNTER — TELEPHONE (OUTPATIENT)
Dept: INTERNAL MEDICINE | Facility: CLINIC | Age: 65
End: 2025-01-11
Payer: MEDICARE

## 2025-01-11 NOTE — TELEPHONE ENCOUNTER
Please contact patient and inform her that her diabetes is uncontrolled.  I will need to increase the dose of her glipizide.  Please ask her what pharmacy she would like that sent to

## 2025-01-12 VITALS
HEIGHT: 63 IN | DIASTOLIC BLOOD PRESSURE: 76 MMHG | WEIGHT: 179.44 LBS | OXYGEN SATURATION: 97 % | HEART RATE: 80 BPM | SYSTOLIC BLOOD PRESSURE: 138 MMHG | TEMPERATURE: 99 F | BODY MASS INDEX: 31.79 KG/M2

## 2025-01-12 PROBLEM — E11.42 DIABETIC POLYNEUROPATHY ASSOCIATED WITH TYPE 2 DIABETES MELLITUS: Status: ACTIVE | Noted: 2025-01-12

## 2025-01-12 NOTE — PROGRESS NOTES
Subjective:       Patient ID: Monica Knight is a 64 y.o. female.    Chief Complaint: Hypertension    HPI  She returns for management of hypertension.  She has had hypertension for over a year.  Current treatment has included medications outlined in medication list.  She denies  shortness of breath.  No palpitations.  Denies left arm or neck pain.  She complains of occasional episodes of chest pain.  Currently asymptomatic    Past medical history: Hypertension, diabetes, cervical and lumbar spinal stenosis, status post  .  She had a colonoscopy in [place] 2022 needs repeat 5 years after     Medications:  Glucotrol, metformin, Zestoretic, Crestor,tirzepatide     No known drug allergies        Review of Systems   Constitutional:  Negative for chills, fatigue, fever and unexpected weight change.   Respiratory:  Negative for chest tightness and shortness of breath.    Cardiovascular:  Negative for chest pain and palpitations.   Gastrointestinal:  Negative for abdominal pain and blood in stool.   Neurological:  Negative for dizziness, syncope, numbness and headaches.       Objective:      Physical Exam  HENT:      Right Ear: External ear normal.      Left Ear: External ear normal.      Nose: Nose normal.      Mouth/Throat:      Mouth: Mucous membranes are moist.      Pharynx: Oropharynx is clear.   Eyes:      Pupils: Pupils are equal, round, and reactive to light.   Cardiovascular:      Rate and Rhythm: Normal rate and regular rhythm.      Heart sounds: No murmur heard.  Pulmonary:      Breath sounds: Normal breath sounds.   Abdominal:      General: There is no distension.      Palpations: There is no hepatomegaly or splenomegaly.      Tenderness: There is no abdominal tenderness.   Musculoskeletal:      Cervical back: Normal range of motion.   Lymphadenopathy:      Cervical: No cervical adenopathy.      Upper Body:      Right upper body: No axillary adenopathy.      Left upper body: No axillary  adenopathy.   Neurological:      Cranial Nerves: No cranial nerve deficit.      Sensory: No sensory deficit.      Motor: Motor function is intact.      Deep Tendon Reflexes: Reflexes are normal and symmetric.         Assessment/Plan       Assessment and plan: 1.  Hypertension: Controlled.  Continue Zestoretic.  Check CMP, lipid panel, CBC   2.  Chest pain: Schedule cardiology appointment.  Advised her to call immediately if symptom recurs  3.  Diabetes: Check A1c and urine microalbumin    Visit today included increased complexity associated with the care of the episodic problem hypertension addressed and managing the longitudinal care of the patient due to the serious and/or complex managed problem(s) hypertension, chest pain.

## 2025-01-15 ENCOUNTER — TELEPHONE (OUTPATIENT)
Dept: INTERNAL MEDICINE | Facility: CLINIC | Age: 65
End: 2025-01-15
Payer: MEDICARE

## 2025-01-15 NOTE — TELEPHONE ENCOUNTER
Called and spoke to patient. Went over medications. Patient is not taking her metformin and glipizide when her blood sugars are below 100. Patient does not want to take another pill. Went over food options as to not getting the diarrhea and upset stomach from Metformin. Encouraged her to restart the metformin and to take as prescribed monitor food consumption and choices. Patient in agreement  with new plan. Patient verbalized understanding with read back.

## 2025-01-26 ENCOUNTER — OFFICE VISIT (OUTPATIENT)
Dept: URGENT CARE | Facility: CLINIC | Age: 65
End: 2025-01-26
Payer: MEDICARE

## 2025-01-26 VITALS
OXYGEN SATURATION: 97 % | WEIGHT: 179 LBS | HEART RATE: 78 BPM | BODY MASS INDEX: 31.71 KG/M2 | TEMPERATURE: 98 F | RESPIRATION RATE: 20 BRPM | SYSTOLIC BLOOD PRESSURE: 109 MMHG | DIASTOLIC BLOOD PRESSURE: 63 MMHG | HEIGHT: 63 IN

## 2025-01-26 DIAGNOSIS — J01.00 ACUTE MAXILLARY SINUSITIS, RECURRENCE NOT SPECIFIED: ICD-10-CM

## 2025-01-26 DIAGNOSIS — J04.0 LARYNGITIS: Primary | ICD-10-CM

## 2025-01-26 PROCEDURE — 96372 THER/PROPH/DIAG INJ SC/IM: CPT | Mod: S$GLB,,, | Performed by: FAMILY MEDICINE

## 2025-01-26 PROCEDURE — 99214 OFFICE O/P EST MOD 30 MIN: CPT | Mod: 25,S$GLB,, | Performed by: FAMILY MEDICINE

## 2025-01-26 RX ORDER — DEXAMETHASONE SODIUM PHOSPHATE 10 MG/ML
10 INJECTION INTRAMUSCULAR; INTRAVENOUS
Status: COMPLETED | OUTPATIENT
Start: 2025-01-26 | End: 2025-01-26

## 2025-01-26 RX ORDER — AMOXICILLIN AND CLAVULANATE POTASSIUM 875; 125 MG/1; MG/1
1 TABLET, FILM COATED ORAL EVERY 12 HOURS
Qty: 14 TABLET | Refills: 0 | Status: SHIPPED | OUTPATIENT
Start: 2025-01-26 | End: 2025-02-02

## 2025-01-26 RX ORDER — DEXAMETHASONE SODIUM PHOSPHATE 4 MG/ML
6 INJECTION, SOLUTION INTRA-ARTICULAR; INTRALESIONAL; INTRAMUSCULAR; INTRAVENOUS; SOFT TISSUE
Status: DISCONTINUED | OUTPATIENT
Start: 2025-01-26 | End: 2025-01-26

## 2025-01-26 RX ADMIN — DEXAMETHASONE SODIUM PHOSPHATE 10 MG: 10 INJECTION INTRAMUSCULAR; INTRAVENOUS at 12:01

## 2025-01-26 NOTE — PROGRESS NOTES
"Subjective:      Patient ID: Monica Knight is a 64 y.o. female.    Vitals:  height is 5' 3" (1.6 m) and weight is 81.2 kg (179 lb). Her oral temperature is 98.2 °F (36.8 °C). Her blood pressure is 109/63 and her pulse is 78. Her respiration is 20 and oxygen saturation is 97%.     Chief Complaint: Sinus Problem    This is a 64 y.o. female who presents today with a chief complaint of mucus in throat, scratchy throat, cough, nasal congestion, left earache, & headaches that began 3 weeks ago.  Patient has tried Mucinex, throat lozenges, & Tylenol w/ temporary relief.  Denies any fever or GI issues.     Sinus Problem  This is a new problem. The current episode started 1 to 4 weeks ago. The problem has been gradually worsening since onset. There has been no fever. Her pain is at a severity of 7/10. The pain is severe. Associated symptoms include congestion, coughing, ear pain, headaches, a hoarse voice, sneezing and a sore throat. Pertinent negatives include no chills, diaphoresis, neck pain, shortness of breath, sinus pressure or swollen glands. Past treatments include acetaminophen and oral decongestants. The treatment provided mild relief.     Constitution: Negative for chills and sweating.   HENT:  Positive for ear pain, congestion and sore throat. Negative for sinus pressure.    Neck: Negative for neck pain.   Respiratory:  Positive for cough. Negative for shortness of breath.    Allergic/Immunologic: Positive for sneezing.   Neurological:  Positive for headaches.      Objective:     Physical Exam  Constitutional: Pt oriented to person, place, and time.  Non-toxic appearance.   Patient does not appear ill. No distress. normal  HENT: No icterus or facial swelling appreciated  Head: Normocephalic and atraumatic.   Nose: +  congestion.  Tenderness to palpation overlying the left axillary sinus  Oropharynx: pharynx/tonsils without erythema or exudates. No uvular shift or soft palate swelling. No stridor  Voice " hoarseness present  Ears:  Left: TM without erythema, bulging or retraction. EAC without drainage or debris/cerumen impaction or swelling, external ear structures normal  Right: TM without erythema, bulging or retraction. EAC without drainage or debris/cerumen impaction or swelling, external ear structures normal  Pulmonary/Chest: Effort normal. No stridor. No respiratory distress.   Abdominal: Normal appearance. Abdomen exhibits no distension.   Musculoskeletal:         General: No swelling.   Neurological: no focal deficit. Patient is alert and oriented to person, place, and time.   Skin: Skin is not diaphoretic and not pale. no jaundice  Psychiatric: Patients behavior is normal. Mood, judgment and thought content normal.     Assessment:     1. Laryngitis    2. Acute maxillary sinusitis, recurrence not specified        Plan:       Laryngitis  -     Discontinue: dexAMETHasone injection 6 mg  -     dexAMETHasone injection 10 mg    Acute maxillary sinusitis, recurrence not specified  -     amoxicillin-clavulanate 875-125mg (AUGMENTIN) 875-125 mg per tablet; Take 1 tablet by mouth every 12 (twelve) hours. for 7 days  Dispense: 14 tablet; Refill: 0

## 2025-01-29 ENCOUNTER — OFFICE VISIT (OUTPATIENT)
Dept: PRIMARY CARE CLINIC | Facility: CLINIC | Age: 65
End: 2025-01-29
Payer: MEDICARE

## 2025-01-29 VITALS
HEIGHT: 63 IN | TEMPERATURE: 99 F | OXYGEN SATURATION: 96 % | SYSTOLIC BLOOD PRESSURE: 106 MMHG | DIASTOLIC BLOOD PRESSURE: 60 MMHG | BODY MASS INDEX: 31.25 KG/M2 | WEIGHT: 176.38 LBS | HEART RATE: 85 BPM

## 2025-01-29 DIAGNOSIS — J02.9 SORE THROAT: ICD-10-CM

## 2025-01-29 DIAGNOSIS — J01.90 ACUTE BACTERIAL SINUSITIS: Primary | ICD-10-CM

## 2025-01-29 DIAGNOSIS — B96.89 ACUTE BACTERIAL SINUSITIS: Primary | ICD-10-CM

## 2025-01-29 DIAGNOSIS — E11.9 TYPE 2 DIABETES MELLITUS WITHOUT COMPLICATION, WITHOUT LONG-TERM CURRENT USE OF INSULIN: ICD-10-CM

## 2025-01-29 DIAGNOSIS — R52 BODY ACHES: ICD-10-CM

## 2025-01-29 PROCEDURE — 99999 PR PBB SHADOW E&M-EST. PATIENT-LVL IV: CPT | Mod: PBBFAC,HCNC,, | Performed by: FAMILY MEDICINE

## 2025-01-29 RX ORDER — BENZONATATE 200 MG/1
200 CAPSULE ORAL 3 TIMES DAILY PRN
Qty: 30 CAPSULE | Refills: 0 | Status: SHIPPED | OUTPATIENT
Start: 2025-01-29 | End: 2025-02-08

## 2025-01-29 NOTE — ASSESSMENT & PLAN NOTE
She desires to try her OTC remedies first since Augmentin x 2 tabs caused nausea & emesis    If she does not feel better in a few days, she may restart this antibiotic

## 2025-01-29 NOTE — ASSESSMENT & PLAN NOTE
Recent steroid injection at  may have elevated glucose. Continue meds. Recommend NO SUGAR, lots water & fresh clean foods.

## 2025-01-29 NOTE — PROGRESS NOTES
"      Assessment:     1. Acute bacterial sinusitis    2. Sore throat    3. Body aches    4. Type 2 diabetes mellitus without complication, without long-term current use of insulin      Plan:     Body aches    An UPPER RESPIRATORY ILLNESS is initially caused by a virus or allergies 95% of the time. The goal to help you feel better is drying up the drainage & stopping the cough so the virus can run it's course in about 10 days. If your drainage becomes more thick and worse after 7-10 days of trying the below  over the counter medications, please make an appointment for further evaluation    If you have POST NASAL DRIP , "RUNNY NOSE" or SORE THROAT FROM CLEARING YOUR THROAT :  Take an ANTIHISTAMINE  (Claritin or Zyrtec or Allegra ) AT NIGHT    Nasal Saline: Tilt head back and squirt into nostril 2-3 times until you taste saline in back of throat. Spit, and blow nose. Do this 4 times, alternating right and left nostril.   Do this routine 2-3 times per day.     NASAL SPRAY - Ipratropium , Nasacort (nasal steroid) or Flonase (nasal steroid)  Twice daily to decrease swelling & post nasal drip ------ very safe , works where the congestion is , & does not interfere with other medication or go through your blood stream. Consider prescription Ipratropium  Bromide nasal 0.03%    If you STILL HAVE DRAINAGE or EAR POPPING/ PRESSURE/ DECREASED HEARING  You can add a DECONGESTANT like Sudafed (if it doesn't cause palpitations, do NOT take this is you have high blood pressure or atrial fibrillation) or Sudafed PE  In the MORNING.     If you have THICK MUCOUS DRAINAGE from the NOSE or COUGHING UP THICK PHLEGM:  You can add a MUCOLYTIC like MUCINEX plain (guaifenesin) 1200mg twice a day    If your COUGH PERSISTS:  Nasal Spray - Ipratropium 4 times a day  Acetylcysteine reduces cough  You can add a COUGH SUPPRESSANT like DELSYM (dextromethorphan) twice a day    If you have PAIN, SORE THROAT, FEVER OR CHILLS:  You can ALTERNATE 250- 500 " "mg of  TYLENOL with  200-400 mg of IBUPROFEN every 3 hours - for the next 3 days  Discontinue and call me if  you have stomach upset    For SORE THROAT , try: Gargle with warm salt water 2-3 times per day.     Drink lots of WATER until your urine is clear because all of the above medications can "dry you out" and cause constipation.       Acute bacterial sinusitis  She desires to try her OTC remedies first since Augmentin x 2 tabs caused nausea & emesis    If she does not feel better in a few days, she may restart this antibiotic    Type 2 diabetes mellitus without complication, without long-term current use of insulin  Recent steroid injection at  may have elevated glucose. Continue meds. Recommend NO SUGAR, lots water & fresh clean foods.     Sore throat  tessalon      Given excuse for work    HPI: Monica Knight is a 64 y.o. female with is here today for general exam.     History of Present Illness    CHIEF COMPLAINT:  Patient presents today for worsening symptoms after being seen at urgent care    HISTORY OF PRESENT ILLNESS:  She reports flu-like symptoms starting two weeks ago, including terrible sore throat, nagging cough with mucus production, body aches, headaches, earaches, and lightheadedness. She experiences thick nasal discharge throughout the day with nosebleeds. She reports a low-grade fever of 99.8. She has not been resting properly. At urgent care, she received Augmentin and a steroid injection but discontinued both after one day due to experiencing nausea and vomiting.    DIET:  She maintains a light diet of juices, soup, and crackers due to GI symptoms.    MEDICAL HISTORY:  She has a history of diabetes.      ROS:  ROS as indicated in HPI.         Denies chest pain, shortness of breath    Current Outpatient Medications   Medication Instructions    albuterol (PROVENTIL/VENTOLIN HFA) 90 mcg/actuation inhaler 2 puffs, Inhalation, Every 6 hours PRN, Rescue    amoxicillin-clavulanate 875-125mg " (AUGMENTIN) 875-125 mg per tablet 1 tablet, Oral, Every 12 hours    benzonatate (TESSALON) 200 mg, Oral, 3 times daily PRN    blood sugar diagnostic (TRUE METRIX GLUCOSE TEST STRIP) Strp Use as directed to check blood sugar once daily.    blood-glucose meter (TRUE METRIX AIR GLUCOSE METER) Misc Use as directed to check blood sugar once daily.    cetirizine (ZYRTEC) 10 mg, Oral, Daily    glipiZIDE (GLUCOTROL) 5 mg, Oral, With breakfast    HYDROcodone-acetaminophen (NORCO) 5-325 mg per tablet 1 tablet, Every 6 hours PRN    ibuprofen (ADVIL,MOTRIN) 800 mg, 2 times daily    ketoconazole (NIZORAL) 2 % cream Topical (Top), Daily    lancets (TRUEPLUS LANCETS) 33 gauge Misc Use as directed to check blood sugar once daily.    LIDOcaine (LIDODERM) 5 % 1 patch, Transdermal, Daily, Remove & Discard patch within 12 hours or as directed by MD    lisinopriL-hydrochlorothiazide (PRINZIDE,ZESTORETIC) 20-12.5 mg per tablet take 1 tablet by mouth once a day    metFORMIN (GLUCOPHAGE) 1,000 mg, Oral, 2 times daily with meals    methocarbamoL (ROBAXIN) 750 mg, Oral, 3 times daily PRN    MOUNJARO 10 mg, Subcutaneous, Every 7 days    pantoprazole (PROTONIX) 40 mg, Oral, Daily    rosuvastatin (CRESTOR) 20 mg, Oral, Daily       Lab Results   Component Value Date    HGBA1C 8.3 (H) 01/07/2025    HGBA1C 8.2 (H) 08/02/2024    HGBA1C 7.6 (H) 05/16/2024     Lab Results   Component Value Date    MICALBCREAT 3.6 01/07/2025     Lab Results   Component Value Date    LDLCALC 120.6 01/07/2025    LDLCALC 128.2 01/10/2024    CHOL 197 01/07/2025    HDL 62 01/07/2025    TRIG 72 01/07/2025       Lab Results   Component Value Date     01/07/2025    K 4.6 01/07/2025     01/07/2025    CO2 26 01/07/2025     (H) 01/07/2025    BUN 9 01/07/2025    CREATININE 0.8 01/07/2025    CALCIUM 9.4 01/07/2025    PROT 7.8 01/07/2025    ALBUMIN 4.0 01/07/2025    BILITOT 0.4 01/07/2025    ALKPHOS 72 01/07/2025    AST 19 01/07/2025    ALT 14 01/07/2025     "ANIONGAP 8 01/07/2025    ESTGFRAFRICA >60.0 10/25/2021    EGFRNONAA >60.0 10/25/2021    WBC 4.83 01/07/2025    HGB 13.6 01/07/2025    HGB 12.9 01/10/2024    HCT 43.3 01/07/2025    MCV 95 01/07/2025     01/07/2025    TSH 0.845 01/10/2024       No results found for: "LH", "FSH", "TOTALTESTOST", "PROGESTERONE", "ESTRADIOL", "[id number]", "[id number]", "FERRITIN", "IRON", "TRANSFERRIN", "TIBC", "FESATURATED", "ZINC"      Past Medical History:   Diagnosis Date    Arthritis     Asthma     Diabetes mellitus     Hypertension      Past Surgical History:   Procedure Laterality Date    COLONOSCOPY      2022 in [place]    EPIDURAL STEROID INJECTION N/A 02/22/2024    Procedure: CERVICAL C7/T1 DARNELL;  Surgeon: Roxann Randle MD;  Location: [facility];  Service: Pain Management;  Laterality: N/A;  [phone]    ESOPHAGOGASTRODUODENOSCOPY N/A 10/25/2024    Procedure: EGD (ESOPHAGOGASTRODUODENOSCOPY);  Surgeon: Yash Rosado MD;  Location: [facility];  Service: Endoscopy;  Laterality: N/A;    INJECTION OF JOINT Bilateral 03/28/2024    Procedure: INJECTION, JOINT BILATERAL SI AND BILATERAL GTB;  Surgeon: Roxann Randle MD;  Location: [facility];  Service: Pain Management;  Laterality: Bilateral;  [phone]  2 WK F/U SHIRA  *SCHEDULE AFTER 3/21*    TRANSFORAMINAL EPIDURAL INJECTION OF STEROID Bilateral 08/22/2024    Procedure: LUMBAR TRANSFORAMINAL BILATERAL L4/5;  Surgeon: Roxann Randle MD;  Location: [facility];  Service: Pain Management;  Laterality: Bilateral;  [phone]  2 WK F/U EBEN     Vitals:    01/29/25 1245   BP: 106/60   Pulse: 85   Temp: 98.8 °F (37.1 °C)   SpO2: 96%   Weight: 80 kg (176 lb 5.9 oz)   Height: 5' 3" (1.6 m)   PainSc:   3     Wt Readings from Last 5 Encounters:   01/29/25 80 kg (176 lb 5.9 oz)   01/26/25 81.2 kg (179 lb)   01/07/25 81.4 kg (179 lb 7.3 oz)   10/25/24 82.1 kg (181 lb)   10/14/24 81.6 kg (180 lb)     Objective:   Physical Exam  Vitals and nursing note reviewed. "   Constitutional:       General: She is not in acute distress.     Appearance: She is well-developed.   HENT:      Right Ear: Tympanic membrane and external ear normal.      Left Ear: Tympanic membrane and external ear normal.      Nose: Mucosal edema and rhinorrhea present.      Right Sinus: No maxillary sinus tenderness or frontal sinus tenderness.      Left Sinus: No maxillary sinus tenderness or frontal sinus tenderness.      Mouth/Throat:      Pharynx: Posterior oropharyngeal erythema present. No oropharyngeal exudate.   Eyes:      Pupils: Pupils are equal, round, and reactive to light.   Neck:      Thyroid: No thyromegaly.   Cardiovascular:      Rate and Rhythm: Normal rate and regular rhythm.      Heart sounds: Normal heart sounds. No murmur heard.  Pulmonary:      Effort: Pulmonary effort is normal.      Breath sounds: Normal breath sounds. No wheezing or rales.   Musculoskeletal:      Cervical back: Normal range of motion and neck supple.   Lymphadenopathy:      Cervical: No cervical adenopathy.   Skin:     General: Skin is warm and dry.

## 2025-01-29 NOTE — LETTER
January 29, 2025        Priyanka Knight  [address] [address]             [facility]  [address]  [place] [address]  Phone: [phone]  Fax: [phone]   Patient: Monica Knight   MR Number: [id number]   YOB: 1960   Date of Visit: 1/29/2025     Monica was evaluated in my office today. Please excuse from work until Monday February 3, 2025    Sincerely,        Lorrie Martínez MD            CC  No Recipients    Enclosure

## 2025-01-29 NOTE — ASSESSMENT & PLAN NOTE
"  An UPPER RESPIRATORY ILLNESS is initially caused by a virus or allergies 95% of the time. The goal to help you feel better is drying up the drainage & stopping the cough so the virus can run it's course in about 10 days. If your drainage becomes more thick and worse after 7-10 days of trying the below  over the counter medications, please make an appointment for further evaluation    If you have POST NASAL DRIP , "RUNNY NOSE" or SORE THROAT FROM CLEARING YOUR THROAT :  Take an ANTIHISTAMINE  (Claritin or Zyrtec or Allegra ) AT NIGHT    Nasal Saline: Tilt head back and squirt into nostril 2-3 times until you taste saline in back of throat. Spit, and blow nose. Do this 4 times, alternating right and left nostril.   Do this routine 2-3 times per day.     NASAL SPRAY - Ipratropium , Nasacort (nasal steroid) or Flonase (nasal steroid)  Twice daily to decrease swelling & post nasal drip ------ very safe , works where the congestion is , & does not interfere with other medication or go through your blood stream. Consider prescription Ipratropium  Bromide nasal 0.03%    If you STILL HAVE DRAINAGE or EAR POPPING/ PRESSURE/ DECREASED HEARING  You can add a DECONGESTANT like Sudafed (if it doesn't cause palpitations, do NOT take this is you have high blood pressure or atrial fibrillation) or Sudafed PE  In the MORNING.     If you have THICK MUCOUS DRAINAGE from the NOSE or COUGHING UP THICK PHLEGM:  You can add a MUCOLYTIC like MUCINEX plain (guaifenesin) 1200mg twice a day    If your COUGH PERSISTS:  Nasal Spray - Ipratropium 4 times a day  Acetylcysteine reduces cough  You can add a COUGH SUPPRESSANT like DELSYM (dextromethorphan) twice a day    If you have PAIN, SORE THROAT, FEVER OR CHILLS:  You can ALTERNATE 250- 500 mg of  TYLENOL with  200-400 mg of IBUPROFEN every 3 hours - for the next 3 days  Discontinue and call me if  you have stomach upset    For SORE THROAT , try: Gargle with warm salt water 2-3 times per day. " "    Drink lots of WATER until your urine is clear because all of the above medications can "dry you out" and cause constipation.     "

## 2025-02-14 ENCOUNTER — PATIENT MESSAGE (OUTPATIENT)
Dept: INTERNAL MEDICINE | Facility: CLINIC | Age: 65
End: 2025-02-14
Payer: MEDICARE

## 2025-02-14 ENCOUNTER — HOSPITAL ENCOUNTER (OUTPATIENT)
Dept: CARDIOLOGY | Facility: CLINIC | Age: 65
Discharge: HOME | End: 2025-02-14
Payer: MEDICARE

## 2025-02-14 DIAGNOSIS — R07.9 CHEST PAIN, UNSPECIFIED TYPE: ICD-10-CM

## 2025-02-14 LAB
OHS QRS DURATION: 66 MS
OHS QTC CALCULATION: 439 MS

## 2025-02-14 PROCEDURE — 93005 ELECTROCARDIOGRAM TRACING: CPT | Mod: HCNC,S$GLB,, | Performed by: INTERNAL MEDICINE

## 2025-02-14 PROCEDURE — 93010 ELECTROCARDIOGRAM REPORT: CPT | Mod: HCNC,S$GLB,, | Performed by: INTERNAL MEDICINE

## 2025-02-20 ENCOUNTER — OFFICE VISIT (OUTPATIENT)
Dept: CARDIOLOGY | Facility: CLINIC | Age: 65
End: 2025-02-20
Payer: MEDICARE

## 2025-02-20 VITALS
DIASTOLIC BLOOD PRESSURE: 60 MMHG | BODY MASS INDEX: 31.12 KG/M2 | WEIGHT: 175.63 LBS | HEIGHT: 63 IN | HEART RATE: 81 BPM | SYSTOLIC BLOOD PRESSURE: 120 MMHG

## 2025-02-20 DIAGNOSIS — E78.5 HYPERLIPIDEMIA, UNSPECIFIED HYPERLIPIDEMIA TYPE: ICD-10-CM

## 2025-02-20 DIAGNOSIS — I10 PRIMARY HYPERTENSION: ICD-10-CM

## 2025-02-20 DIAGNOSIS — E11.9 TYPE 2 DIABETES MELLITUS WITHOUT COMPLICATION, WITHOUT LONG-TERM CURRENT USE OF INSULIN: ICD-10-CM

## 2025-02-20 DIAGNOSIS — R07.9 CHEST PAIN, UNSPECIFIED TYPE: Primary | ICD-10-CM

## 2025-02-20 DIAGNOSIS — R07.89 ATYPICAL CHEST PAIN: ICD-10-CM

## 2025-02-20 NOTE — PROGRESS NOTES
[facility]     Subjective:    Patient ID:  Monica Knight is a 64 y.o. female who presents for evaluation of Chest Pain, Diabetes Mellitus, Hyperlipidemia, and Hypertension    Review of patient's allergies indicates:   Allergen Reactions    Peanut Swelling     Throat swells and gets hives  Throat swells and gets hives  Throat swells and gets hives       She is kindly referred for evaluation of left arm and chest discomfort.  She is a diabetic.  She has hypertension.  She takes rosuvastatin for hyperlipidemia with current  mg%.  She walks for exercise 3 times a week.  She works full-time.  It sounds like she is active.    She reported discomfort mid bicipital area described as pressure.  It lasts seconds.  She states that it radiates into the left breast and chest area.  It has never been with activity.  She has history of cervical spine disease.  Previous evaluations have been completed.  It is nonsurgical disease.  Last hemoglobin A1c was 8.2.  She states her blood pressures are stable.    Her electrocardiogram performed on day of complaint of chest pain was sinus rhythm normal tracing.         Review of Systems   Constitutional: Negative for chills, decreased appetite, diaphoresis, fever, malaise/fatigue, night sweats, weight gain and weight loss.   HENT:  Negative for congestion, ear discharge, ear pain, hearing loss, hoarse voice, nosebleeds, odynophagia, sore throat, stridor and tinnitus.    Eyes:  Negative for blurred vision, discharge, double vision, pain, photophobia, redness, vision loss in left eye, vision loss in right eye, visual disturbance and visual halos.   Cardiovascular:  Positive for chest pain. Negative for claudication, cyanosis, dyspnea on exertion, irregular heartbeat, leg swelling, near-syncope, orthopnea, palpitations, paroxysmal nocturnal dyspnea and syncope.   Respiratory:  Negative for cough,  "hemoptysis, shortness of breath, sleep disturbances due to breathing, snoring, sputum production and wheezing.    Endocrine: Negative for cold intolerance, heat intolerance, polydipsia, polyphagia and polyuria.   Hematologic/Lymphatic: Negative for adenopathy and bleeding problem. Does not bruise/bleed easily.   Skin:  Negative for color change, dry skin, flushing, itching, nail changes, poor wound healing, rash, skin cancer, suspicious lesions and unusual hair distribution.   Musculoskeletal:  Positive for neck pain. Negative for arthritis, back pain, falls, gout, joint pain, joint swelling, muscle cramps, muscle weakness, myalgias and stiffness.        Left arm pain   Gastrointestinal:  Negative for bloating, abdominal pain, anorexia, change in bowel habit, bowel incontinence, constipation, diarrhea, dysphagia, excessive appetite, flatus, heartburn, hematemesis, hematochezia, hemorrhoids, jaundice, melena, nausea and vomiting.   Genitourinary:  Negative for bladder incontinence, decreased libido, dysuria, flank pain, frequency, genital sores, hematuria, hesitancy, incomplete emptying, nocturia and urgency.   Neurological:  Negative for aphonia, brief paralysis, difficulty with concentration, disturbances in coordination, excessive daytime sleepiness, dizziness, focal weakness, headaches, light-headedness, loss of balance, numbness, paresthesias, seizures, sensory change, tremors, vertigo and weakness.   Psychiatric/Behavioral:  Negative for altered mental status, depression, hallucinations, memory loss, substance abuse, suicidal ideas and thoughts of violence. The patient does not have insomnia and is not nervous/anxious.    Allergic/Immunologic: Negative for hives and persistent infections.        Objective:       Vitals:    02/20/25 1014   BP: 120/60   BP Location: Right arm   Patient Position: Sitting   Pulse: 81   Weight: 79.6 kg (175 lb 9.5 oz)   Height: 5' 3" (1.6 m)    Physical Exam  Constitutional:       " General: She is not in acute distress.     Appearance: She is well-developed. She is not diaphoretic.   HENT:      Head: Normocephalic and atraumatic.      Nose: Nose normal.   Eyes:      General: No scleral icterus.        Right eye: No discharge.      Conjunctiva/sclera: Conjunctivae normal.      Pupils: Pupils are equal, round, and reactive to light.   Neck:      Thyroid: No thyromegaly.      Vascular: No JVD.      Trachea: No tracheal deviation.   Cardiovascular:      Rate and Rhythm: Normal rate and regular rhythm.      Pulses:           Carotid pulses are 2+ on the right side and 2+ on the left side.       Radial pulses are 2+ on the right side and 2+ on the left side.        Dorsalis pedis pulses are 2+ on the right side and 2+ on the left side.        Posterior tibial pulses are 2+ on the right side and 2+ on the left side.      Heart sounds: Normal heart sounds. No murmur heard.     No friction rub. No gallop.   Pulmonary:      Effort: Pulmonary effort is normal. No respiratory distress.      Breath sounds: Normal breath sounds. No stridor. No wheezing or rales.   Chest:      Chest wall: No tenderness.   Abdominal:      General: Bowel sounds are normal. There is no distension.      Palpations: Abdomen is soft. There is no mass.      Tenderness: There is no abdominal tenderness. There is no guarding or rebound.   Musculoskeletal:         General: No tenderness. Normal range of motion.      Cervical back: Normal range of motion and neck supple.   Lymphadenopathy:      Cervical: No cervical adenopathy.   Skin:     General: Skin is warm and dry.      Coloration: Skin is not pale.      Findings: No erythema or rash.   Neurological:      Mental Status: She is alert and oriented to person, place, and time.      Cranial Nerves: No cranial nerve deficit.      Coordination: Coordination normal.   Psychiatric:         Behavior: Behavior normal.         Thought Content: Thought content normal.         Judgment:  "Judgment normal.           Assessment:       1. Primary hypertension    2. Chest pain, unspecified type    3. Type 2 diabetes mellitus without complication, without long-term current use of insulin    4. Atypical chest pain    5. BMI 32.0-32.9,adult    6. Hyperlipidemia, unspecified hyperlipidemia type      Results for orders placed or performed during the hospital encounter of 02/14/25   EKG 12-lead    Collection Time: 02/14/25 11:07 AM   Result Value Ref Range    QRS Duration 66 ms    [facility] QTC Calculation 439 ms       Current Medications[1]     Lab Results   Component Value Date    WBC 4.83 01/07/2025    RBC 4.54 01/07/2025    HGB 13.6 01/07/2025    HCT 43.3 01/07/2025    MCV 95 01/07/2025    MCH 30.0 01/07/2025    MCHC 31.4 (L) 01/07/2025    RDW 13.1 01/07/2025     01/07/2025    MPV 10.9 01/07/2025    GRAN 2.0 01/07/2025    GRAN 41.4 01/07/2025    LYMPH 2.1 01/07/2025    LYMPH 43.9 01/07/2025    MONO 0.3 01/07/2025    MONO 5.8 01/07/2025    EOS 0.4 01/07/2025    BASO 0.07 01/07/2025    EOSINOPHIL 7.5 01/07/2025    BASOPHIL 1.4 01/07/2025        CMP  Lab Results   Component Value Date     01/07/2025    K 4.6 01/07/2025     01/07/2025    CO2 26 01/07/2025     (H) 01/07/2025    BUN 9 01/07/2025    CREATININE 0.8 01/07/2025    CALCIUM 9.4 01/07/2025    PROT 7.8 01/07/2025    ALBUMIN 4.0 01/07/2025    BILITOT 0.4 01/07/2025    ALKPHOS 72 01/07/2025    AST 19 01/07/2025    ALT 14 01/07/2025    ANIONGAP 8 01/07/2025    ESTGFRAFRICA >60.0 10/25/2021    EGFRNONAA >60.0 10/25/2021        No results found for: "LABBLOO", "LABURIN", "RESPIRATORYC", "GSRESP"         Results for orders placed or performed during the hospital encounter of 02/14/25   EKG 12-lead    Collection Time: 02/14/25 11:07 AM   Result Value Ref Range    QRS Duration 66 ms    [facility]HS QTC Calculation 439 ms    Narrative    Test Reason : R07.9,    Vent. Rate :  81 BPM     Atrial Rate :  81 BPM     P-R Int : 156 ms          QRS Dur :  66 " ms      QT Int : 378 ms       P-R-T Axes :  52  -6  21 degrees    QTcB Int : 439 ms    Normal sinus rhythm  Normal ECG  No previous ECGs available  Confirmed by Jeyson Mckay ([id number]) on 2/14/2025 1:33:25 PM    Referred By: ALBERTA HA           Confirmed By: Jeyson Mckay        CT Cervical Spine Without Contrast 11/12/2024 [id number] Final   Mammo Digital Screening Bilat w/ Carlitos 10/14/2024 [id number] Final   X-Ray Abdomen AP 1 View 10/04/2024 [id number] Final             Plan:       Problem List Items Addressed This Visit          Cardiac/Vascular    Primary hypertension - Primary    Her blood pressures are controlled.  She monitors.  Condition stable.  Zestoretic to continue.         Hyperlipidemia    Rosuvastatin 20 mg to continue.  Current  mg%.  It is tolerated well.            Endocrine    Type 2 diabetes mellitus without complication, without long-term current use of insulin    Last hemoglobin A1c 8.2.  She is on Mounjaro.  Condition unchanged.         BMI 32.0-32.9,adult    Continued weight loss encouraged.            Other    Atypical chest pain    Symptoms lasting seconds.  No exertional worsening.  Her 12 lead electrocardiogram-baseline is normal.  She exercises on a regular basis.    I told the patient it would be very unlikely that those symptoms would relate to coronary artery disease.  I did recommend a calcium score she wants to wait.    I advised a 2 month follow-up.  She stated that if the symptoms worsen she would call me.          Other Visit Diagnoses         Chest pain, unspecified type                   Management discussed with the patient.  Her symptoms are quite atypical.  We discussed the documented evidence that women present with atypical chest pain that could be cardiac.    I recommended a calcium but she is not interested at this time.  I will see her back in 2 months for reassessment.    We discussed her LDL.  It is not ideal but for now I do not recommend increased dosing or  medication changes.  She is considered primary prevention.    Thank you for allowing me to participate in your patient's care.              Carlton Cunningham MD  02/20/2025   10:39 AM               [1]   Current Outpatient Medications:     albuterol (PROVENTIL/VENTOLIN HFA) 90 mcg/actuation inhaler, Inhale 2 puffs into the lungs every 6 (six) hours as needed for Wheezing. Rescue, Disp: 54 g, Rfl: 2    blood sugar diagnostic (TRUE METRIX GLUCOSE TEST STRIP) Strp, Use as directed to check blood sugar once daily., Disp: 100 strip, Rfl: 2    blood-glucose meter (TRUE METRIX AIR GLUCOSE METER) Misc, Use as directed to check blood sugar once daily., Disp: 1 each, Rfl: 0    cetirizine (ZYRTEC) 10 MG tablet, Take 1 tablet (10 mg total) by mouth once daily. (Patient taking differently: Take 10 mg by mouth daily as needed.), Disp: 90 tablet, Rfl: 1    glipiZIDE (GLUCOTROL) 5 MG tablet, Take 1 tablet (5 mg total) by mouth daily with breakfast., Disp: 30 tablet, Rfl: 11    HYDROcodone-acetaminophen (NORCO) 5-325 mg per tablet, Take 1 tablet by mouth every 6 (six) hours as needed for Pain., Disp: , Rfl:     ketoconazole (NIZORAL) 2 % cream, Apply topically once daily., Disp: 30 g, Rfl: 1    lancets (TRUEPLUS LANCETS) 33 gauge Misc, Use as directed to check blood sugar once daily., Disp: 100 each, Rfl: 2    LIDOcaine (LIDODERM) 5 %, Place 1 patch onto the skin once daily. Remove & Discard patch within 12 hours or as directed by MD, Disp: 30 patch, Rfl: 2    lisinopriL-hydrochlorothiazide (PRINZIDE,ZESTORETIC) 20-12.5 mg per tablet, take 1 tablet by mouth once a day, Disp: 90 tablet, Rfl: 0    metFORMIN (GLUCOPHAGE) 1000 MG tablet, Take 1 tablet (1,000 mg total) by mouth 2 (two) times daily with meals., Disp: 180 tablet, Rfl: 1    methocarbamoL (ROBAXIN) 750 MG Tab, Take 1 tablet (750 mg total) by mouth 3 (three) times daily as needed (muscular pain)., Disp: 90 tablet, Rfl: 2    pantoprazole (PROTONIX) 40 MG tablet, Take 1 tablet  (40 mg total) by mouth once daily., Disp: 90 tablet, Rfl: 3    rosuvastatin (CRESTOR) 20 MG tablet, Take 1 tablet (20 mg total) by mouth once daily., Disp: 90 tablet, Rfl: 3    tirzepatide 10 mg/0.5 mL PnIj, Inject 10 mg into the skin every 7 days., Disp: 12 Pen, Rfl: 3

## 2025-02-24 DIAGNOSIS — Z00.00 ENCOUNTER FOR MEDICARE ANNUAL WELLNESS EXAM: ICD-10-CM

## 2025-03-02 RX ORDER — LISINOPRIL AND HYDROCHLOROTHIAZIDE 12.5; 2 MG/1; MG/1
TABLET ORAL
Qty: 90 TABLET | Refills: 2 | Status: SHIPPED | OUTPATIENT
Start: 2025-03-02 | End: 2028-03-02

## 2025-03-02 NOTE — TELEPHONE ENCOUNTER
No care due was identified.  [facility] Embedded Care Due Messages. Reference number: [id number].   3/02/2025 2:48:19 PM CST

## 2025-03-03 NOTE — TELEPHONE ENCOUNTER
Refill Decision Note   Monica Knight  is requesting a refill authorization.  Brief Assessment and Rationale for Refill:  Approve     Medication Therapy Plan:         Comments:     Note composed:8:51 PM 03/02/2025             Appointments     Last Visit   1/7/2025 Shirley Cantu MD   Next Visit   Visit date not found Shirley Cantu MD

## 2025-03-24 ENCOUNTER — TELEPHONE (OUTPATIENT)
Dept: INTERNAL MEDICINE | Facility: CLINIC | Age: 65
End: 2025-03-24
Payer: MEDICARE

## 2025-04-08 ENCOUNTER — PATIENT MESSAGE (OUTPATIENT)
Dept: OTOLARYNGOLOGY | Facility: CLINIC | Age: 65
End: 2025-04-08
Payer: MEDICARE

## 2025-04-09 NOTE — ASSESSMENT & PLAN NOTE
Continued weight loss encouraged.  
Her blood pressures are controlled.  She monitors.  Condition stable.  Zestoretic to continue.  
Last hemoglobin A1c 8.2.  She is on Mounjaro.  Condition unchanged.  
Rosuvastatin 20 mg to continue.  Current  mg%.  It is tolerated well.  
Symptoms lasting seconds.  No exertional worsening.  Her 12 lead electrocardiogram-baseline is normal.  She exercises on a regular basis.    I told the patient it would be very unlikely that those symptoms would relate to coronary artery disease.  I did recommend a calcium score she wants to wait.    I advised a 2 month follow-up.  She stated that if the symptoms worsen she would call me.  
no

## 2025-04-14 ENCOUNTER — TELEPHONE (OUTPATIENT)
Dept: INTERNAL MEDICINE | Facility: CLINIC | Age: 65
End: 2025-04-14
Payer: MEDICARE

## 2025-04-14 NOTE — TELEPHONE ENCOUNTER
Copied from CRM #[id number]. Topic: Appointments - Amb Referral  >> Apr 14, 2025  2:36 PM Fernie wrote:  Type: General Call Back     Name of Caller:pt  Reason scheduled pt for an appt on 05/23 pt wants to have lab work done before her visit   Would the patient rather a call back or a response via [facility]? Call   Best Call Back Number:[phone]   Additional Information:

## 2025-04-15 ENCOUNTER — HOSPITAL ENCOUNTER (OUTPATIENT)
Dept: RADIOLOGY | Facility: HOSPITAL | Age: 65
Discharge: HOME | End: 2025-04-15
Attending: ORTHOPAEDIC SURGERY
Payer: MEDICARE

## 2025-04-15 ENCOUNTER — OFFICE VISIT (OUTPATIENT)
Dept: ORTHOPEDICS | Facility: CLINIC | Age: 65
End: 2025-04-15
Payer: MEDICARE

## 2025-04-15 VITALS — BODY MASS INDEX: 31.1 KG/M2 | WEIGHT: 175.5 LBS | HEIGHT: 63 IN

## 2025-04-15 DIAGNOSIS — M79.641 RIGHT HAND PAIN: ICD-10-CM

## 2025-04-15 DIAGNOSIS — M79.641 BILATERAL HAND PAIN: ICD-10-CM

## 2025-04-15 DIAGNOSIS — M79.642 BILATERAL HAND PAIN: ICD-10-CM

## 2025-04-15 DIAGNOSIS — M79.641 RIGHT HAND PAIN: Primary | ICD-10-CM

## 2025-04-15 DIAGNOSIS — G56.03 BILATERAL CARPAL TUNNEL SYNDROME: Primary | ICD-10-CM

## 2025-04-15 DIAGNOSIS — M79.641 BILATERAL HAND PAIN: Primary | ICD-10-CM

## 2025-04-15 DIAGNOSIS — M79.642 BILATERAL HAND PAIN: Primary | ICD-10-CM

## 2025-04-15 PROCEDURE — 3008F BODY MASS INDEX DOCD: CPT | Mod: CPTII,S$GLB,, | Performed by: ORTHOPAEDIC SURGERY

## 2025-04-15 PROCEDURE — 1159F MED LIST DOCD IN RCRD: CPT | Mod: CPTII,S$GLB,, | Performed by: ORTHOPAEDIC SURGERY

## 2025-04-15 PROCEDURE — 99999 PR PBB SHADOW E&M-EST. PATIENT-LVL III: CPT | Mod: PBBFAC,,, | Performed by: ORTHOPAEDIC SURGERY

## 2025-04-15 PROCEDURE — 1125F AMNT PAIN NOTED PAIN PRSNT: CPT | Mod: CPTII,S$GLB,, | Performed by: ORTHOPAEDIC SURGERY

## 2025-04-15 PROCEDURE — 3066F NEPHROPATHY DOC TX: CPT | Mod: CPTII,S$GLB,, | Performed by: ORTHOPAEDIC SURGERY

## 2025-04-15 PROCEDURE — 4010F ACE/ARB THERAPY RXD/TAKEN: CPT | Mod: CPTII,S$GLB,, | Performed by: ORTHOPAEDIC SURGERY

## 2025-04-15 PROCEDURE — 1101F PT FALLS ASSESS-DOCD LE1/YR: CPT | Mod: CPTII,S$GLB,, | Performed by: ORTHOPAEDIC SURGERY

## 2025-04-15 PROCEDURE — 73130 X-RAY EXAM OF HAND: CPT | Mod: TC,50

## 2025-04-15 PROCEDURE — 99204 OFFICE O/P NEW MOD 45 MIN: CPT | Mod: S$GLB,,, | Performed by: ORTHOPAEDIC SURGERY

## 2025-04-15 PROCEDURE — 3052F HG A1C>EQUAL 8.0%<EQUAL 9.0%: CPT | Mod: CPTII,S$GLB,, | Performed by: ORTHOPAEDIC SURGERY

## 2025-04-15 PROCEDURE — 73130 X-RAY EXAM OF HAND: CPT | Mod: 26,50,, | Performed by: RADIOLOGY

## 2025-04-15 PROCEDURE — 3061F NEG MICROALBUMINURIA REV: CPT | Mod: CPTII,S$GLB,, | Performed by: ORTHOPAEDIC SURGERY

## 2025-04-15 PROCEDURE — 3288F FALL RISK ASSESSMENT DOCD: CPT | Mod: CPTII,S$GLB,, | Performed by: ORTHOPAEDIC SURGERY

## 2025-04-15 NOTE — PROGRESS NOTES
[facility]  History & Physical  Orthopedics    SUBJECTIVE:      Chief Complaint:  Bilateral hand pain    Referring Provider: Self, Aaareferral     History of Present Illness:  Patient is a 64 y.o. right hand dominant female who presents today with complaints of bilateral hand pain.  The patient reports that in July 2024, she noticed intermittent pain of the bilateral hands.  She underwent EMG in November 2024 which showed severe carpal tunnel syndrome of the right, moderate carpal tunnel syndrome on the left.  She states that since that time, she continues to have intermittent pain to the bilateral hands, right worse than left.  The other night, she had shooting pain to the right palm which did awake her from sleep.  She states that she has used ex 39 stem cell therapy patches in the area of her pain with good relief.  Her pain is worse at night.  No prior injuries or surgeries to the bilateral hands.  She has never tried night splints.    The patient is a/an retired but does [employer] work every now and then..    Onset of symptoms/DOI was July 2024.    Symptoms are aggravated by at night.    Symptoms are alleviated by  patches .    Symptoms consist of pain and numbness/tingling.    The patient rates their pain as a 8/10.    Attempted treatment(s) and/or interventions include activity modifications, rest, rest.     The patient denies any fevers, chills, N/V, D/C and presents for evaluation.       Past Medical History:   Diagnosis Date    Arthritis     Asthma     Diabetes mellitus      Past Surgical History:   Procedure Laterality Date    COLONOSCOPY      2022 in [place]    EPIDURAL STEROID INJECTION N/A 02/22/2024    Procedure: CERVICAL C7/T1 DARNELL;  Surgeon: Roxann Randle MD;  Location: [facility];  Service: Pain Management;  Laterality: N/A;  [phone]    ESOPHAGOGASTRODUODENOSCOPY N/A 10/25/2024    Procedure: EGD (ESOPHAGOGASTRODUODENOSCOPY);  Surgeon: Yash Rosado MD;  Location: [facility]  "ENDO;  Service: Endoscopy;  Laterality: N/A;    INJECTION OF JOINT Bilateral 03/28/2024    Procedure: INJECTION, JOINT BILATERAL SI AND BILATERAL GTB;  Surgeon: Roxann Randle MD;  Location: [facility];  Service: Pain Management;  Laterality: Bilateral;  [phone]  2 WK F/U SHIRA  *SCHEDULE AFTER 3/21*    TRANSFORAMINAL EPIDURAL INJECTION OF STEROID Bilateral 08/22/2024    Procedure: LUMBAR TRANSFORAMINAL BILATERAL L4/5;  Surgeon: Roxann Randle MD;  Location: [facility];  Service: Pain Management;  Laterality: Bilateral;  [phone]  2 WK F/U EBEN     Review of patient's allergies indicates:   Allergen Reactions    Peanut Swelling     Throat swells and gets hives  Throat swells and gets hives  Throat swells and gets hives       Social History     Social History Narrative    Not on file     Family History   Problem Relation Name Age of Onset    Colon cancer Neg Hx         Current Medications[1]      Review of Systems:  As per HPI otherwise noncontributory    OBJECTIVE:      Vital Signs (Most Recent):  Vitals:    04/15/25 0940   Weight: 79.6 kg (175 lb 7.8 oz)   Height: 5' 3" (1.6 m)     Body mass index is 31.09 kg/m².    Bilateral Hand/Wrist Examination:    Observation/Inspection:  Swelling  none    Deformity  none  Discoloration  none     Scars   none    Atrophy  none    HAND/WRIST EXAMINATION:  Finkelstein's Test   Neg  WHAT Test    Neg  Snuff box tenderness   Neg  Downey's Test    Neg  Hook of Hamate Tenderness  Neg  CMC grind    Neg  Circumduction test   Neg    Neurovascular Exam:  Digits WWP, brisk CR < 3s throughout  NVI motor/LTS to M/R/U nerves, radial pulse 2+  Tinel's Test - Carpal Tunnel  positive on the right, mildly positive on the left.  Tinel's Test - Cubital Tunnel  Neg bilaterally  Median Nerve Compression Test positive on the right, negative on the left.    ROM hand full, painless  Normal muscle bulk with the bilateral thenar eminences.    ROM wrist full, painless    ROM elbow full, " painless    Abdomen not guarded  Respirations nonlabored  Perfusion intact    Diagnostic Results:     Imaging - I independently viewed the patient's imaging as well as the radiology report.  Xrays of the patient's bilateral hands  demonstrate no evidence of any acute fractures or dislocations or significant degenerative changes.  She has a degenerative changes of the 1st CMC joint bilaterally.    EMG -     ASSESSMENT/PLAN:      64 y.o. yo female with bilateral carpal tunnel syndrome.  This is graded as severe on the right, moderate on the left.    Plan: The patient and I had a thorough discussion today.  We discussed the working diagnosis as well as several other potential alternative diagnoses.  Treatment options were discussed, both conservative and surgical.  Conservative treatment options would include things such as activity modifications, workplace modifications, a period of rest, oral vs topical OTC and prescription anti-inflammatory medications, occupational therapy, splinting/bracing, immobilization, corticosteroid injections, and others.  Surgical options were discussed as well.     At this time, the patient would like to proceed with a trial of nonoperative management.  We will dispense bilateral gel carpal tunnel braces to her today.  I recommended that she wear these at night for symptomatic relief.  She may continue to treat her pain and symptoms with over-the-counter NSAIDs.  She may follow up PRN.    Should the patient's symptoms worsen, persist, or fail to improve they should return for reevaluation and I would be happy to see them back anytime.    ALYSSA Stevens MD  Orthopaedic Surgery   Resident Physician, PGY-2  04/15/2025             [1]   Current Outpatient Medications:     albuterol (PROVENTIL/VENTOLIN HFA) 90 mcg/actuation inhaler, Inhale 2 puffs into the lungs every 6 (six) hours as needed for Wheezing. Rescue, Disp: 54 g, Rfl: 2    blood sugar diagnostic (TRUE METRIX GLUCOSE TEST STRIP)  Strp, Use as directed to check blood sugar once daily., Disp: 100 strip, Rfl: 2    blood-glucose meter (TRUE METRIX AIR GLUCOSE METER) [facility], Use as directed to check blood sugar once daily., Disp: 1 each, Rfl: 0    cetirizine (ZYRTEC) 10 MG tablet, Take 1 tablet (10 mg total) by mouth once daily. (Patient taking differently: Take 10 mg by mouth daily as needed.), Disp: 90 tablet, Rfl: 1    glipiZIDE (GLUCOTROL) 5 MG tablet, Take 1 tablet (5 mg total) by mouth daily with breakfast., Disp: 30 tablet, Rfl: 11    HYDROcodone-acetaminophen (NORCO) 5-325 mg per tablet, Take 1 tablet by mouth every 6 (six) hours as needed for Pain., Disp: , Rfl:     ketoconazole (NIZORAL) 2 % cream, Apply topically once daily., Disp: 30 g, Rfl: 1    lancets (TRUEPLUS LANCETS) 33 gauge Misc, Use as directed to check blood sugar once daily., Disp: 100 each, Rfl: 2    LIDOcaine (LIDODERM) 5 %, Place 1 patch onto the skin once daily. Remove & Discard patch within 12 hours or as directed by MD, Disp: 30 patch, Rfl: 2    lisinopriL-hydrochlorothiazide (PRINZIDE,ZESTORETIC) 20-12.5 mg per tablet, take 1 tablet by mouth once a day, Disp: 90 tablet, Rfl: 2    metFORMIN (GLUCOPHAGE) 1000 MG tablet, Take 1 tablet (1,000 mg total) by mouth 2 (two) times daily with meals., Disp: 180 tablet, Rfl: 1    methocarbamoL (ROBAXIN) 750 MG Tab, Take 1 tablet (750 mg total) by mouth 3 (three) times daily as needed (muscular pain)., Disp: 90 tablet, Rfl: 2    pantoprazole (PROTONIX) 40 MG tablet, Take 1 tablet (40 mg total) by mouth once daily., Disp: 90 tablet, Rfl: 3    rosuvastatin (CRESTOR) 20 MG tablet, Take 1 tablet (20 mg total) by mouth once daily., Disp: 90 tablet, Rfl: 3    tirzepatide 10 mg/0.5 mL PnIj, Inject 10 mg into the skin every 7 days., Disp: 12 Pen, Rfl: 3

## 2025-04-17 ENCOUNTER — TELEPHONE (OUTPATIENT)
Dept: CARDIOLOGY | Facility: CLINIC | Age: 65
End: 2025-04-17
Payer: MEDICARE

## 2025-04-17 ENCOUNTER — PATIENT MESSAGE (OUTPATIENT)
Dept: CARDIOLOGY | Facility: CLINIC | Age: 65
End: 2025-04-17
Payer: MEDICARE

## 2025-04-17 NOTE — TELEPHONE ENCOUNTER
Attempted to contact patient regarding appointment location change. No answer. VM full.  Sent portal message.

## 2025-04-21 ENCOUNTER — TELEPHONE (OUTPATIENT)
Dept: CARDIOLOGY | Facility: CLINIC | Age: 65
End: 2025-04-21
Payer: MEDICARE

## 2025-04-22 ENCOUNTER — TELEPHONE (OUTPATIENT)
Dept: CARDIOLOGY | Facility: CLINIC | Age: 65
End: 2025-04-22
Payer: MEDICARE

## 2025-04-25 ENCOUNTER — TELEPHONE (OUTPATIENT)
Dept: INTERNAL MEDICINE | Facility: CLINIC | Age: 65
End: 2025-04-25
Payer: MEDICARE

## 2025-04-25 NOTE — TELEPHONE ENCOUNTER
----- Message from Ellie sent at 4/25/2025 12:50 PM CDT -----  Type:  CallWho Called: Rosmery/Treva Does the patient know what this is regarding?:Annual Wellness ExamWould the patient rather a call back or a response via [facility]? callTHE Football App Call Back Number: [phone]  fax [phone] Information: Need records showing the pt has completed her Annual Wellness Exam.

## 2025-05-06 ENCOUNTER — OFFICE VISIT (OUTPATIENT)
Dept: INTERNAL MEDICINE | Facility: CLINIC | Age: 65
End: 2025-05-06
Payer: MEDICARE

## 2025-05-06 ENCOUNTER — LAB VISIT (OUTPATIENT)
Dept: LAB | Facility: HOSPITAL | Age: 65
End: 2025-05-06
Payer: MEDICARE

## 2025-05-06 VITALS
BODY MASS INDEX: 31.76 KG/M2 | SYSTOLIC BLOOD PRESSURE: 102 MMHG | OXYGEN SATURATION: 98 % | DIASTOLIC BLOOD PRESSURE: 54 MMHG | HEART RATE: 86 BPM | WEIGHT: 179.25 LBS | HEIGHT: 63 IN

## 2025-05-06 DIAGNOSIS — M25.50 ARTHRALGIA, UNSPECIFIED JOINT: ICD-10-CM

## 2025-05-06 DIAGNOSIS — E11.9 TYPE 2 DIABETES MELLITUS WITHOUT COMPLICATION, WITHOUT LONG-TERM CURRENT USE OF INSULIN: ICD-10-CM

## 2025-05-06 DIAGNOSIS — I10 PRIMARY HYPERTENSION: ICD-10-CM

## 2025-05-06 DIAGNOSIS — E11.9 TYPE 2 DIABETES MELLITUS WITHOUT COMPLICATION, WITHOUT LONG-TERM CURRENT USE OF INSULIN: Primary | ICD-10-CM

## 2025-05-06 LAB
ALBUMIN SERPL BCP-MCNC: 4 G/DL (ref 3.5–5.2)
ALP SERPL-CCNC: 82 UNIT/L (ref 40–150)
ALT SERPL W/O P-5'-P-CCNC: 11 UNIT/L (ref 10–44)
ANION GAP (OHS): 11 MMOL/L (ref 8–16)
AST SERPL-CCNC: 19 UNIT/L (ref 11–45)
BILIRUB SERPL-MCNC: 0.4 MG/DL (ref 0.1–1)
BUN SERPL-MCNC: 13 MG/DL (ref 8–23)
C PEPTIDE SERPL-MCNC: 2.66 NG/ML (ref 0.78–5.19)
CALCIUM SERPL-MCNC: 9.4 MG/DL (ref 8.7–10.5)
CHLORIDE SERPL-SCNC: 102 MMOL/L (ref 95–110)
CO2 SERPL-SCNC: 27 MMOL/L (ref 23–29)
CREAT SERPL-MCNC: 1 MG/DL (ref 0.5–1.4)
EAG (OHS): 171 MG/DL (ref 68–131)
ERYTHROCYTE [SEDIMENTATION RATE] IN BLOOD BY PHOTOMETRIC METHOD: 17 MM/HR
GFR SERPLBLD CREATININE-BSD FMLA CKD-EPI: >60 ML/MIN/1.73/M2
GLUCOSE SERPL-MCNC: 99 MG/DL (ref 70–110)
HBA1C MFR BLD: 7.6 % (ref 4–5.6)
LIPASE SERPL-CCNC: 43 U/L (ref 4–60)
POTASSIUM SERPL-SCNC: 4 MMOL/L (ref 3.5–5.1)
PROT SERPL-MCNC: 7.5 GM/DL (ref 6–8.4)
SODIUM SERPL-SCNC: 140 MMOL/L (ref 136–145)

## 2025-05-06 PROCEDURE — 3074F SYST BP LT 130 MM HG: CPT | Mod: CPTII,S$GLB,, | Performed by: PHYSICIAN ASSISTANT

## 2025-05-06 PROCEDURE — 3078F DIAST BP <80 MM HG: CPT | Mod: CPTII,S$GLB,, | Performed by: PHYSICIAN ASSISTANT

## 2025-05-06 PROCEDURE — 4010F ACE/ARB THERAPY RXD/TAKEN: CPT | Mod: CPTII,S$GLB,, | Performed by: PHYSICIAN ASSISTANT

## 2025-05-06 PROCEDURE — 1101F PT FALLS ASSESS-DOCD LE1/YR: CPT | Mod: CPTII,S$GLB,, | Performed by: PHYSICIAN ASSISTANT

## 2025-05-06 PROCEDURE — 3061F NEG MICROALBUMINURIA REV: CPT | Mod: CPTII,S$GLB,, | Performed by: PHYSICIAN ASSISTANT

## 2025-05-06 PROCEDURE — 3066F NEPHROPATHY DOC TX: CPT | Mod: CPTII,S$GLB,, | Performed by: PHYSICIAN ASSISTANT

## 2025-05-06 PROCEDURE — 99999 PR PBB SHADOW E&M-EST. PATIENT-LVL IV: CPT | Mod: PBBFAC,,, | Performed by: PHYSICIAN ASSISTANT

## 2025-05-06 PROCEDURE — 99214 OFFICE O/P EST MOD 30 MIN: CPT | Mod: S$GLB,,, | Performed by: PHYSICIAN ASSISTANT

## 2025-05-06 PROCEDURE — 85652 RBC SED RATE AUTOMATED: CPT

## 2025-05-06 PROCEDURE — 80053 COMPREHEN METABOLIC PANEL: CPT

## 2025-05-06 PROCEDURE — 84681 ASSAY OF C-PEPTIDE: CPT

## 2025-05-06 PROCEDURE — 1126F AMNT PAIN NOTED NONE PRSNT: CPT | Mod: CPTII,S$GLB,, | Performed by: PHYSICIAN ASSISTANT

## 2025-05-06 PROCEDURE — 3051F HG A1C>EQUAL 7.0%<8.0%: CPT | Mod: CPTII,S$GLB,, | Performed by: PHYSICIAN ASSISTANT

## 2025-05-06 PROCEDURE — 3288F FALL RISK ASSESSMENT DOCD: CPT | Mod: CPTII,S$GLB,, | Performed by: PHYSICIAN ASSISTANT

## 2025-05-06 PROCEDURE — 3008F BODY MASS INDEX DOCD: CPT | Mod: CPTII,S$GLB,, | Performed by: PHYSICIAN ASSISTANT

## 2025-05-06 PROCEDURE — 1159F MED LIST DOCD IN RCRD: CPT | Mod: CPTII,S$GLB,, | Performed by: PHYSICIAN ASSISTANT

## 2025-05-06 PROCEDURE — 83036 HEMOGLOBIN GLYCOSYLATED A1C: CPT

## 2025-05-06 PROCEDURE — 83690 ASSAY OF LIPASE: CPT

## 2025-05-06 PROCEDURE — 36415 COLL VENOUS BLD VENIPUNCTURE: CPT

## 2025-05-06 NOTE — PROGRESS NOTES
INTERNAL MEDICINE PROGRESS NOTE    CHIEF COMPLAINT     Chief Complaint   Patient presents with    Annual Exam       HPI     Monica Knight is a 64 y.o. female who presents for an Follow-up visit today.    Here for DM follow-up  PCP is [facility]  Pt is known to me    This appointment was unfortunately scheduled in correctly.  She thought she was here for an enhanced wellness visit today.  She does need an updated A1c.    She has hypertension that is managed with lisinopril hydrochlorothiazide.  She takes this medication regularly but has noticed that her blood pressure is on the low side of normal and that occasionally she has some lightheadedness with position changes.      She has diabetes that she manages with Tirzepatide, metformin, glipizide.  She tolerates this regimen well and reports that blood sugar is under fair control.  Most recent A1c is improved to 7.6 (down from previous 8.3 four months ago)    Wt Readings from Last 10 Encounters:   05/06/25 81.3 kg (179 lb 3.7 oz)   04/15/25 79.6 kg (175 lb 7.8 oz)   02/20/25 79.6 kg (175 lb 9.5 oz)   01/29/25 80 kg (176 lb 5.9 oz)   01/26/25 81.2 kg (179 lb)   01/07/25 81.4 kg (179 lb 7.3 oz)   10/25/24 82.1 kg (181 lb)   10/14/24 81.6 kg (180 lb)   10/08/24 81.7 kg (180 lb 1.9 oz)   10/04/24 81.1 kg (178 lb 12.7 oz)      Health maintenance:  She is eligible for hepatitis-C and HIV screening - politely declined  Eligible for pneumonia vaccine update, tetanus vaccine update, shingles vaccine series and RSV vaccine.   She is due for colorectal cancer screening  Due for diabetic eye exam      Past Medical History:  Past Medical History:   Diagnosis Date    Arthritis     Asthma     Diabetes mellitus        Home Medications:  Prior to Admission medications    Medication Sig Start Date End Date Taking? Authorizing Provider   albuterol (PROVENTIL/VENTOLIN HFA) 90 mcg/actuation inhaler Inhale 2 puffs into the lungs every 6 (six) hours as needed for Wheezing. Rescue 12/2/24   Yes Shirley Cantu MD   blood sugar diagnostic (TRUE METRIX GLUCOSE TEST STRIP) Strp Use as directed to check blood sugar once daily. 1/17/24  Yes Kelsey Zhao NP   blood-glucose meter (TRUE METRIX AIR GLUCOSE METER) Misc Use as directed to check blood sugar once daily. 1/17/24  Yes Kelsey Zhao NP   glipiZIDE (GLUCOTROL) 5 MG tablet Take 1 tablet (5 mg total) by mouth daily with breakfast. 5/20/24 5/20/25 Yes Lulu Disla MD   HYDROcodone-acetaminophen (NORCO) 5-325 mg per tablet Take 1 tablet by mouth every 6 (six) hours as needed for Pain.   Yes Provider, Historical   lancets (TRUEPLUS LANCETS) 33 gauge Misc Use as directed to check blood sugar once daily. 1/17/24  Yes Kelsey Zhao NP   LIDOcaine (LIDODERM) 5 % Place 1 patch onto the skin once daily. Remove & Discard patch within 12 hours or as directed by MD 7/24/24  Yes Roxann Randle MD   lisinopriL-hydrochlorothiazide (PRINZIDE,ZESTORETIC) 20-12.5 mg per tablet take 1 tablet by mouth once a day 3/2/25 3/2/28 Yes Shirley Cantu MD   metFORMIN (GLUCOPHAGE) 1000 MG tablet Take 1 tablet (1,000 mg total) by mouth 2 (two) times daily with meals. 4/2/24 5/6/25 Yes Shirley Cantu MD   methocarbamoL (ROBAXIN) 750 MG Tab Take 1 tablet (750 mg total) by mouth 3 (three) times daily as needed (muscular pain). 4/17/24  Yes Roxann Randle MD   rosuvastatin (CRESTOR) 20 MG tablet Take 1 tablet (20 mg total) by mouth once daily. 12/31/24 12/31/25 Yes Kelsey Zhao NP   tirzepatide 10 mg/0.5 mL PnIj Inject 10 mg into the skin every 7 days. 8/2/24  Yes Shirley Cantu MD   cetirizine (ZYRTEC) 10 MG tablet Take 1 tablet (10 mg total) by mouth once daily.  Patient not taking: Reported on 5/6/2025 1/10/24 1/9/25  Kelsey Zhao, NP   ketoconazole (NIZORAL) 2 % cream Apply topically once daily.  Patient not taking: Reported on 5/6/2025 10/4/24   Kelsey Palumbo, IOANA   pantoprazole (PROTONIX) 40 MG tablet Take 1 tablet (40 mg total) by mouth once  "daily.  Patient not taking: Reported on 5/6/2025 10/25/24 10/25/25  Yash Rosado MD       Review of Systems:  Review of Systems   Constitutional:  Negative for chills and fever.   HENT:  Negative for sore throat and trouble swallowing.    Eyes:  Negative for visual disturbance.   Respiratory:  Negative for cough and shortness of breath.    Cardiovascular:  Negative for chest pain.   Gastrointestinal:  Negative for abdominal pain, constipation, diarrhea, nausea and vomiting.   Genitourinary:  Negative for dysuria and flank pain.   Musculoskeletal:  Negative for back pain, neck pain and neck stiffness.   Skin:  Negative for rash.   Neurological:  Negative for dizziness, syncope, weakness and headaches.   Psychiatric/Behavioral:  Negative for confusion.        Health Maintainence:   Immunizations:  Health Maintenance         Date Due Completion Date    Hepatitis C Screening Never done ---    HIV Screening Never done ---    TETANUS VACCINE Never done ---    Pneumococcal Vaccines (Age 50+) (1 of 2 - PCV) Never done ---    Colorectal Cancer Screening Never done ---    Shingles Vaccine (1 of 2) Never done ---    RSV Vaccine (Age 60+ and Pregnant patients) (1 - Risk 60-74 years 1-dose series) Never done ---    Hemoglobin A1c 04/07/2025 1/7/2025    Diabetic Eye Exam 06/22/2025 6/22/2024    Foot Exam 10/04/2025 10/4/2024    Mammogram 10/14/2025 10/14/2024    Diabetes Urine Screening 01/07/2026 1/7/2025    Lipid Panel 01/07/2026 1/7/2025    DEXA Scan 09/17/2028 9/17/2024             PHYSICAL EXAM     BP (!) 102/54 (BP Location: Left arm, Patient Position: Sitting)   Pulse 86   Ht 5' 3" (1.6 m)   Wt 81.3 kg (179 lb 3.7 oz)   SpO2 98%   BMI 31.75 kg/m²     Physical Exam  Vitals and nursing note reviewed.   Constitutional:       Appearance: Normal appearance.      Comments: Healthy-appearing female in no acute distress or apparent pain.  She makes good eye contact, speaks in clear full sentences and ambulates with " ease.   HENT:      Head: Normocephalic and atraumatic.      Nose: Nose normal.      Mouth/Throat:      Pharynx: Oropharynx is clear.   Eyes:      Conjunctiva/sclera: Conjunctivae normal.   Cardiovascular:      Rate and Rhythm: Normal rate and regular rhythm.      Pulses: Normal pulses.   Pulmonary:      Effort: No respiratory distress.   Abdominal:      Tenderness: There is no abdominal tenderness.   Musculoskeletal:         General: Normal range of motion.      Cervical back: No rigidity.   Skin:     General: Skin is warm and dry.      Capillary Refill: Capillary refill takes less than 2 seconds.      Findings: No rash.   Neurological:      General: No focal deficit present.      Mental Status: She is alert.      Gait: Gait normal.   Psychiatric:         Mood and Affect: Mood normal.         LABS     Lab Results   Component Value Date    HGBA1C 8.3 (H) 01/07/2025     CMP  Sodium   Date Value Ref Range Status   01/07/2025 139 136 - 145 mmol/L Final     Potassium   Date Value Ref Range Status   01/07/2025 4.6 3.5 - 5.1 mmol/L Final     Chloride   Date Value Ref Range Status   01/07/2025 105 95 - 110 mmol/L Final     CO2   Date Value Ref Range Status   01/07/2025 26 23 - 29 mmol/L Final     Glucose   Date Value Ref Range Status   01/07/2025 142 (H) 70 - 110 mg/dL Final     BUN   Date Value Ref Range Status   01/07/2025 9 8 - 23 mg/dL Final     Creatinine   Date Value Ref Range Status   01/07/2025 0.8 0.5 - 1.4 mg/dL Final     Calcium   Date Value Ref Range Status   01/07/2025 9.4 8.7 - 10.5 mg/dL Final     Total Protein   Date Value Ref Range Status   01/07/2025 7.8 6.0 - 8.4 g/dL Final     Albumin   Date Value Ref Range Status   01/07/2025 4.0 3.5 - 5.2 g/dL Final     Total Bilirubin   Date Value Ref Range Status   01/07/2025 0.4 0.1 - 1.0 mg/dL Final     Comment:     For infants and newborns, interpretation of results should be based  on gestational age, weight and in agreement with  "clinical  observations.    Premature Infant recommended reference ranges:  Up to 24 hours.............<8.0 mg/dL  Up to 48 hours............<12.0 mg/dL  3-5 days..................<15.0 mg/dL  6-29 days.................<15.0 mg/dL       Alkaline Phosphatase   Date Value Ref Range Status   01/07/2025 72 40 - 150 U/L Final     AST   Date Value Ref Range Status   01/07/2025 19 10 - 40 U/L Final     ALT   Date Value Ref Range Status   01/07/2025 14 10 - 44 U/L Final     Anion Gap   Date Value Ref Range Status   01/07/2025 8 8 - 16 mmol/L Final     eGFR if    Date Value Ref Range Status   10/25/2021 >60.0 >60 mL/min/1.73 m^2 Final     eGFR if non    Date Value Ref Range Status   10/25/2021 >60.0 >60 mL/min/1.73 m^2 Final     Comment:     Calculation used to obtain the estimated glomerular filtration  rate (eGFR) is the CKD-EPI equation.        Lab Results   Component Value Date    WBC 4.83 01/07/2025    HGB 13.6 01/07/2025    HCT 43.3 01/07/2025    MCV 95 01/07/2025     01/07/2025     Lab Results   Component Value Date    CHOL 197 01/07/2025    CHOL 215 (H) 01/10/2024    CHOL 230 (H) 10/25/2021     Lab Results   Component Value Date    HDL 62 01/07/2025    HDL 71 01/10/2024    HDL 75 10/25/2021     Lab Results   Component Value Date    LDLCALC 120.6 01/07/2025    LDLCALC 128.2 01/10/2024    LDLCALC 139.4 10/25/2021     Lab Results   Component Value Date    TRIG 72 01/07/2025    TRIG 79 01/10/2024    TRIG 78 10/25/2021     Lab Results   Component Value Date    CHOLHDL 31.5 01/07/2025    CHOLHDL 33.0 01/10/2024    CHOLHDL 32.6 10/25/2021     Lab Results   Component Value Date    TSH 0.845 01/10/2024       ASSESSMENT/PLAN     Monica Knight is a 64 y.o. female     Monica Olson" was seen today for annual exam.    Diagnoses and all orders for this visit:    Type 2 diabetes mellitus without complication, without long-term current use of insulin  -     Hemoglobin A1C; Future  -     " Ambulatory Referral/Consult to Enhanced Annual Wellness Visit (eAWV); Future  -     Lipase; Future  -     Comprehensive Metabolic Panel; Future    Arthralgia, unspecified joint  -     Sedimentation rate; Future  -     CRP    Primary hypertension   -well controlled at this time but pt reports occasionally has symptoms c/w hypotension, she will take 1/2 tab of BP med and will monitor BP at home and give feedback through portal.     BMI 32.0-32.9,adult    -continue GLP-1, work in diet and CV exercise as tolerated.     Patient was counseled on when and how to seek emergent care.       Maye Diallo PA-C   Department of Internal Medicine - [facility]   11:40 AM

## 2025-05-23 ENCOUNTER — HOSPITAL ENCOUNTER (OUTPATIENT)
Dept: RADIOLOGY | Facility: OTHER | Age: 65
Discharge: HOME | End: 2025-05-23
Attending: INTERNAL MEDICINE
Payer: MEDICARE

## 2025-05-23 DIAGNOSIS — K58.1 IRRITABLE BOWEL SYNDROME WITH CONSTIPATION: ICD-10-CM

## 2025-05-23 DIAGNOSIS — R19.4 CHANGE IN BOWEL HABITS: ICD-10-CM

## 2025-05-23 DIAGNOSIS — R14.0 ABDOMINAL BLOATING: ICD-10-CM

## 2025-05-23 DIAGNOSIS — R14.0 ABDOMINAL BLOATING: Primary | ICD-10-CM

## 2025-05-23 PROCEDURE — 74018 RADEX ABDOMEN 1 VIEW: CPT | Mod: TC,FY

## 2025-05-23 PROCEDURE — 74018 RADEX ABDOMEN 1 VIEW: CPT | Mod: 26,,, | Performed by: RADIOLOGY

## 2025-07-02 DIAGNOSIS — E11.9 TYPE 2 DIABETES MELLITUS WITHOUT COMPLICATION, UNSPECIFIED WHETHER LONG TERM INSULIN USE: ICD-10-CM

## 2025-07-28 DIAGNOSIS — E11.9 TYPE 2 DIABETES MELLITUS WITHOUT COMPLICATION, WITHOUT LONG-TERM CURRENT USE OF INSULIN: ICD-10-CM

## 2025-07-28 NOTE — TELEPHONE ENCOUNTER
Copied from CRM #[id number]. Topic: Medications - Medication Refill  >> Jul 28, 2025 12:09 PM Jo wrote:  Type:  RX Refill Request    Who Called: patient  Refill or New Rx:refill  RX Name and Strength:glipiZIDE (GLUCOTROL) 5 MG tablet  How is the patient currently taking it? (ex. 1XDay):  Is this a 30 day or 90 day RX:    Refill or New Rx:refill  RX Name and Strength:tirzepatide 10 mg/0.5 mL PnIj  How is the patient currently taking it? (ex. 1XDay):  Is this a 30 day or 90 day RX:  Preferred Pharmacy with phone number:    [facility] [address] [place] [address]  Phone: [phone] Fax: [phone]      Local or Mail Order:local  Ordering Provider:janny  Would the patient rather a call back or a response via [facility]? call  Best Call Back Number:[phone]  Additional Information:

## 2025-07-28 NOTE — TELEPHONE ENCOUNTER
No care due was identified.  [facility] Embedded Care Due Messages. Reference number: [id number].   7/28/2025 11:42:10 AM CDT

## 2025-07-29 RX ORDER — TIRZEPATIDE 10 MG/.5ML
10 INJECTION, SOLUTION SUBCUTANEOUS
Qty: 12 PEN | Refills: 1 | Status: SHIPPED | OUTPATIENT
Start: 2025-07-29

## 2025-07-29 NOTE — TELEPHONE ENCOUNTER
Refill Decision Note   Monica Knight  is requesting a refill authorization.  Brief Assessment and Rationale for Refill:  Approve     Medication Therapy Plan:         Comments:     Note composed:8:33 AM 07/29/2025             Appointments     Last Visit   1/7/2025 Shirley Cantu MD   Next Visit   Visit date not found Shirley Cantu MD

## 2025-07-31 ENCOUNTER — OFFICE VISIT (OUTPATIENT)
Dept: URGENT CARE | Facility: CLINIC | Age: 65
End: 2025-07-31
Payer: MEDICARE

## 2025-07-31 VITALS
RESPIRATION RATE: 18 BRPM | SYSTOLIC BLOOD PRESSURE: 121 MMHG | OXYGEN SATURATION: 96 % | HEIGHT: 63 IN | DIASTOLIC BLOOD PRESSURE: 64 MMHG | WEIGHT: 179.25 LBS | BODY MASS INDEX: 31.76 KG/M2 | TEMPERATURE: 99 F | HEART RATE: 86 BPM

## 2025-07-31 DIAGNOSIS — M62.838 CERVICAL PARASPINAL MUSCLE SPASM: Primary | ICD-10-CM

## 2025-07-31 DIAGNOSIS — M79.18 MYOFASCIAL PAIN SYNDROME: ICD-10-CM

## 2025-07-31 DIAGNOSIS — M48.02 CERVICAL STENOSIS OF SPINE: ICD-10-CM

## 2025-07-31 DIAGNOSIS — M50.20 CERVICAL DISC HERNIATION: ICD-10-CM

## 2025-07-31 RX ORDER — METHOCARBAMOL 750 MG/1
750 TABLET, FILM COATED ORAL 3 TIMES DAILY PRN
Qty: 90 TABLET | Refills: 2 | Status: SHIPPED | OUTPATIENT
Start: 2025-07-31

## 2025-07-31 RX ORDER — DEXAMETHASONE SODIUM PHOSPHATE 10 MG/ML
6 INJECTION INTRAMUSCULAR; INTRAVENOUS
Status: COMPLETED | OUTPATIENT
Start: 2025-07-31 | End: 2025-07-31

## 2025-07-31 RX ADMIN — DEXAMETHASONE SODIUM PHOSPHATE 6 MG: 10 INJECTION INTRAMUSCULAR; INTRAVENOUS at 11:07

## 2025-07-31 NOTE — PATIENT INSTRUCTIONS
Recommend rest, avoid heavy lifting or heavy objects on shoulders.     Recommend gentle heat and gentle stretches as tolerated.     Steroid anti inflammatory injection in clinic (dexamethasone)    Can use 650-1000 mg of tylenol as well every 8 hours as needed for extra pain control    Can restart Celebrex in 2-3 days as needed    Methocarbamol muscle relaxer as needing    Follow up with primary care doctor and Physical Therapist  and neurosurgery

## 2025-07-31 NOTE — PROGRESS NOTES
"Subjective:      Patient ID: Monica Knight is a 65 y.o. female.    Vitals:  height is 5' 3" (1.6 m) and weight is 81.3 kg (179 lb 3.7 oz). Her oral temperature is 98.6 °F (37 °C). Her blood pressure is 121/64 and her pulse is 86. Her respiration is 18 and oxygen saturation is 96%.     Chief Complaint: Spasms    Monica Knight is a 65 y.o. female with hx of cervical stenosis and spondylosis as well as b/l carpal tunnel ds  c/o 3 days of muscle aches and spasms. Most pain in left neck and shoulder radiating down arm. Other sx include ear pain. Tx salonpas and heat and cold therapy. Pt see's a spine doctor in [facility] as well as ortho/hand (she declined surgery for carpal tunnel) as well as has seen pain mgmt in past (but has decided not to continue treatment with pain mgmt) has had DARNELL in the past cervical and lumbar . Pain keeps her awake at night. Denies CP and SOB    Spasms    ROS   Objective:     Physical Exam  Constitutional: Pt oriented to person, place, and time.  Non-toxic appearance.   Patient does not appear ill. No distress. normal  HENT: No icterus or facial swelling appreciated  Head: Normocephalic and atraumatic.   Nose: No congestion.   Pulmonary/Chest: Effort normal. No stridor. No respiratory distress.   Abdominal: Normal appearance. Abdomen exhibits no distension.   Musculoskeletal:      Cervical spine: + TTP and tightness in the right paraspinal musculature, SCM. Pt unable to rotate  head due to pain  Neurological: no focal deficit. Patient is alert and oriented to person, place, and time.   Skin: Skin is not diaphoretic and not pale. no jaundice  Psychiatric: Patients behavior is normal. Mood, judgment and thought content normal.     Assessment:     1. Cervical paraspinal muscle spasm    2. Myofascial pain syndrome    3. Cervical stenosis of spine    4. Cervical disc herniation        Plan:       Cervical paraspinal muscle spasm    Myofascial pain syndrome  -     dexAMETHasone injection " 6 mg  -     methocarbamoL (ROBAXIN) 750 MG Tab; Take 1 tablet (750 mg total) by mouth 3 (three) times daily as needed (muscular pain).  Dispense: 90 tablet; Refill: 2    Cervical stenosis of spine    Cervical disc herniation    Recommend rest, avoid heavy lifting or heavy objects on shoulders. Gentle heat and stretches as tolerated.   CS injection in clinic  APAP prn  Can restart Celebrex in 2-3 days prn  Methocarbamol muscle relaxer prn  F.u with pcp, PT and neurosurg

## 2025-08-08 RX ORDER — GLIPIZIDE 5 MG/1
5 TABLET ORAL
Qty: 30 TABLET | Refills: 3 | Status: SHIPPED | OUTPATIENT
Start: 2025-08-08 | End: 2026-08-08

## 2025-08-08 NOTE — TELEPHONE ENCOUNTER
Copied from CRM #[id number]. Topic: Medications - Medication Status Check   >> Aug 8, 2025 12:11 PM Franchesca wrote:  .1MEDICALADVICE     Patient is calling for Medical Advice regarding:Pt is calling in regards to the status of her glipiZIDE (GLUCOTROL) 5 MG tablet being filled. Please advise. Thank you    How long has patient had these symptoms:N/A    Pharmacy name and phone#:   [facility] [address] [place] [address]  Phone: [phone] Fax: [phone]        Patient wants a call back or thru [facility], provide patient's call back phone number: [phone] Patient    Comments:Pt sent in the request for this medication on 07/28/2025 and has not had a response of why the medication was not filled. Please advise. Thank you    Please advise patient replies from provider may take up to 48 hours.

## 2025-08-12 ENCOUNTER — OFFICE VISIT (OUTPATIENT)
Dept: PRIMARY CARE CLINIC | Facility: CLINIC | Age: 65
End: 2025-08-12
Payer: MEDICARE

## 2025-08-12 VITALS
OXYGEN SATURATION: 99 % | BODY MASS INDEX: 32.54 KG/M2 | HEART RATE: 86 BPM | HEIGHT: 63 IN | SYSTOLIC BLOOD PRESSURE: 124 MMHG | RESPIRATION RATE: 18 BRPM | DIASTOLIC BLOOD PRESSURE: 72 MMHG | WEIGHT: 183.63 LBS

## 2025-08-12 DIAGNOSIS — M48.02 CERVICAL STENOSIS OF SPINE: Primary | ICD-10-CM

## 2025-08-12 PROCEDURE — 1101F PT FALLS ASSESS-DOCD LE1/YR: CPT | Mod: CPTII,HCNC,S$GLB, | Performed by: FAMILY MEDICINE

## 2025-08-12 PROCEDURE — 1160F RVW MEDS BY RX/DR IN RCRD: CPT | Mod: CPTII,HCNC,S$GLB, | Performed by: FAMILY MEDICINE

## 2025-08-12 PROCEDURE — 4010F ACE/ARB THERAPY RXD/TAKEN: CPT | Mod: CPTII,HCNC,S$GLB, | Performed by: FAMILY MEDICINE

## 2025-08-12 PROCEDURE — 3061F NEG MICROALBUMINURIA REV: CPT | Mod: CPTII,HCNC,S$GLB, | Performed by: FAMILY MEDICINE

## 2025-08-12 PROCEDURE — 99214 OFFICE O/P EST MOD 30 MIN: CPT | Mod: HCNC,S$GLB,, | Performed by: FAMILY MEDICINE

## 2025-08-12 PROCEDURE — 3066F NEPHROPATHY DOC TX: CPT | Mod: CPTII,HCNC,S$GLB, | Performed by: FAMILY MEDICINE

## 2025-08-12 PROCEDURE — 3078F DIAST BP <80 MM HG: CPT | Mod: CPTII,HCNC,S$GLB, | Performed by: FAMILY MEDICINE

## 2025-08-12 PROCEDURE — 3288F FALL RISK ASSESSMENT DOCD: CPT | Mod: CPTII,HCNC,S$GLB, | Performed by: FAMILY MEDICINE

## 2025-08-12 PROCEDURE — 3051F HG A1C>EQUAL 7.0%<8.0%: CPT | Mod: CPTII,HCNC,S$GLB, | Performed by: FAMILY MEDICINE

## 2025-08-12 PROCEDURE — 3074F SYST BP LT 130 MM HG: CPT | Mod: CPTII,HCNC,S$GLB, | Performed by: FAMILY MEDICINE

## 2025-08-12 PROCEDURE — 1125F AMNT PAIN NOTED PAIN PRSNT: CPT | Mod: CPTII,HCNC,S$GLB, | Performed by: FAMILY MEDICINE

## 2025-08-12 PROCEDURE — 3008F BODY MASS INDEX DOCD: CPT | Mod: CPTII,HCNC,S$GLB, | Performed by: FAMILY MEDICINE

## 2025-08-12 PROCEDURE — 1159F MED LIST DOCD IN RCRD: CPT | Mod: CPTII,HCNC,S$GLB, | Performed by: FAMILY MEDICINE

## 2025-08-12 PROCEDURE — 99999 PR PBB SHADOW E&M-EST. PATIENT-LVL IV: CPT | Mod: PBBFAC,HCNC,, | Performed by: FAMILY MEDICINE

## 2025-08-12 RX ORDER — DICLOFENAC SODIUM 75 MG/1
75 TABLET, DELAYED RELEASE ORAL 2 TIMES DAILY
Qty: 20 TABLET | Refills: 0 | Status: SHIPPED | OUTPATIENT
Start: 2025-08-12

## 2025-08-25 ENCOUNTER — OFFICE VISIT (OUTPATIENT)
Dept: PRIMARY CARE CLINIC | Facility: CLINIC | Age: 65
End: 2025-08-25
Payer: MEDICARE

## 2025-08-25 ENCOUNTER — LAB VISIT (OUTPATIENT)
Dept: LAB | Facility: HOSPITAL | Age: 65
End: 2025-08-25
Attending: INTERNAL MEDICINE
Payer: MEDICARE

## 2025-08-25 ENCOUNTER — TELEPHONE (OUTPATIENT)
Dept: RADIOLOGY | Facility: HOSPITAL | Age: 65
End: 2025-08-25
Payer: MEDICARE

## 2025-08-25 VITALS
OXYGEN SATURATION: 100 % | HEIGHT: 63 IN | WEIGHT: 181.69 LBS | DIASTOLIC BLOOD PRESSURE: 70 MMHG | SYSTOLIC BLOOD PRESSURE: 126 MMHG | RESPIRATION RATE: 20 BRPM | TEMPERATURE: 98 F | BODY MASS INDEX: 32.19 KG/M2 | HEART RATE: 73 BPM

## 2025-08-25 DIAGNOSIS — E78.5 HYPERLIPIDEMIA, UNSPECIFIED HYPERLIPIDEMIA TYPE: ICD-10-CM

## 2025-08-25 DIAGNOSIS — E11.9 TYPE 2 DIABETES MELLITUS WITHOUT COMPLICATION, WITHOUT LONG-TERM CURRENT USE OF INSULIN: Primary | ICD-10-CM

## 2025-08-25 DIAGNOSIS — E66.811 OBESITY (BMI 30.0-34.9): ICD-10-CM

## 2025-08-25 DIAGNOSIS — Z11.59 NEED FOR HEPATITIS C SCREENING TEST: ICD-10-CM

## 2025-08-25 DIAGNOSIS — I10 PRIMARY HYPERTENSION: ICD-10-CM

## 2025-08-25 DIAGNOSIS — R92.1 BREAST CALCIFICATIONS: ICD-10-CM

## 2025-08-25 DIAGNOSIS — E11.9 TYPE 2 DIABETES MELLITUS WITHOUT COMPLICATION, WITHOUT LONG-TERM CURRENT USE OF INSULIN: ICD-10-CM

## 2025-08-25 LAB
CHOLEST SERPL-MCNC: 140 MG/DL (ref 120–199)
CHOLEST/HDLC SERPL: 2.1 {RATIO} (ref 2–5)
EAG (OHS): 160 MG/DL (ref 68–131)
HBA1C MFR BLD: 7.2 % (ref 4–5.6)
HCV AB SERPL QL IA: NORMAL
HDLC SERPL-MCNC: 68 MG/DL (ref 40–75)
HDLC SERPL: 48.6 % (ref 20–50)
LDLC SERPL CALC-MCNC: 60 MG/DL (ref 63–159)
NONHDLC SERPL-MCNC: 72 MG/DL
TRIGL SERPL-MCNC: 60 MG/DL (ref 30–150)

## 2025-08-25 PROCEDURE — 4010F ACE/ARB THERAPY RXD/TAKEN: CPT | Mod: CPTII,HCNC,S$GLB, | Performed by: INTERNAL MEDICINE

## 2025-08-25 PROCEDURE — 3051F HG A1C>EQUAL 7.0%<8.0%: CPT | Mod: CPTII,HCNC,S$GLB, | Performed by: INTERNAL MEDICINE

## 2025-08-25 PROCEDURE — 3008F BODY MASS INDEX DOCD: CPT | Mod: CPTII,HCNC,S$GLB, | Performed by: INTERNAL MEDICINE

## 2025-08-25 PROCEDURE — 86803 HEPATITIS C AB TEST: CPT | Mod: HCNC

## 2025-08-25 PROCEDURE — 3066F NEPHROPATHY DOC TX: CPT | Mod: CPTII,HCNC,S$GLB, | Performed by: INTERNAL MEDICINE

## 2025-08-25 PROCEDURE — 1160F RVW MEDS BY RX/DR IN RCRD: CPT | Mod: CPTII,HCNC,S$GLB, | Performed by: INTERNAL MEDICINE

## 2025-08-25 PROCEDURE — 3061F NEG MICROALBUMINURIA REV: CPT | Mod: CPTII,HCNC,S$GLB, | Performed by: INTERNAL MEDICINE

## 2025-08-25 PROCEDURE — G2211 COMPLEX E/M VISIT ADD ON: HCPCS | Mod: HCNC,S$GLB,, | Performed by: INTERNAL MEDICINE

## 2025-08-25 PROCEDURE — 1159F MED LIST DOCD IN RCRD: CPT | Mod: CPTII,HCNC,S$GLB, | Performed by: INTERNAL MEDICINE

## 2025-08-25 PROCEDURE — 3078F DIAST BP <80 MM HG: CPT | Mod: CPTII,HCNC,S$GLB, | Performed by: INTERNAL MEDICINE

## 2025-08-25 PROCEDURE — 1125F AMNT PAIN NOTED PAIN PRSNT: CPT | Mod: CPTII,HCNC,S$GLB, | Performed by: INTERNAL MEDICINE

## 2025-08-25 PROCEDURE — 1101F PT FALLS ASSESS-DOCD LE1/YR: CPT | Mod: CPTII,HCNC,S$GLB, | Performed by: INTERNAL MEDICINE

## 2025-08-25 PROCEDURE — 3288F FALL RISK ASSESSMENT DOCD: CPT | Mod: CPTII,HCNC,S$GLB, | Performed by: INTERNAL MEDICINE

## 2025-08-25 PROCEDURE — 36415 COLL VENOUS BLD VENIPUNCTURE: CPT | Mod: HCNC,PN

## 2025-08-25 PROCEDURE — 99999 PR PBB SHADOW E&M-EST. PATIENT-LVL IV: CPT | Mod: PBBFAC,HCNC,, | Performed by: INTERNAL MEDICINE

## 2025-08-25 PROCEDURE — 99214 OFFICE O/P EST MOD 30 MIN: CPT | Mod: HCNC,S$GLB,, | Performed by: INTERNAL MEDICINE

## 2025-08-25 PROCEDURE — 82465 ASSAY BLD/SERUM CHOLESTEROL: CPT | Mod: HCNC

## 2025-08-25 PROCEDURE — 3074F SYST BP LT 130 MM HG: CPT | Mod: CPTII,HCNC,S$GLB, | Performed by: INTERNAL MEDICINE

## 2025-08-25 PROCEDURE — 83036 HEMOGLOBIN GLYCOSYLATED A1C: CPT | Mod: HCNC

## 2025-08-27 ENCOUNTER — TELEPHONE (OUTPATIENT)
Dept: RADIOLOGY | Facility: HOSPITAL | Age: 65
End: 2025-08-27
Payer: MEDICARE

## 2025-09-03 ENCOUNTER — PATIENT MESSAGE (OUTPATIENT)
Dept: PRIMARY CARE CLINIC | Facility: CLINIC | Age: 65
End: 2025-09-03
Payer: MEDICARE

## 2025-09-03 ENCOUNTER — OFFICE VISIT (OUTPATIENT)
Dept: INTERNAL MEDICINE | Facility: CLINIC | Age: 65
End: 2025-09-03
Payer: MEDICARE

## 2025-09-03 VITALS
OXYGEN SATURATION: 97 % | BODY MASS INDEX: 31.33 KG/M2 | WEIGHT: 176.81 LBS | DIASTOLIC BLOOD PRESSURE: 84 MMHG | HEIGHT: 63 IN | SYSTOLIC BLOOD PRESSURE: 130 MMHG | HEART RATE: 82 BPM

## 2025-09-03 DIAGNOSIS — E78.2 MIXED HYPERLIPIDEMIA: ICD-10-CM

## 2025-09-03 DIAGNOSIS — M79.18 MYOFASCIAL PAIN SYNDROME: ICD-10-CM

## 2025-09-03 DIAGNOSIS — M43.16 SPONDYLOLISTHESIS, LUMBAR REGION: ICD-10-CM

## 2025-09-03 DIAGNOSIS — M48.061 SPINAL STENOSIS OF LUMBAR REGION WITHOUT NEUROGENIC CLAUDICATION: ICD-10-CM

## 2025-09-03 DIAGNOSIS — M53.3 SACROILIAC JOINT PAIN: ICD-10-CM

## 2025-09-03 DIAGNOSIS — E11.9 TYPE 2 DIABETES MELLITUS WITHOUT COMPLICATION, WITHOUT LONG-TERM CURRENT USE OF INSULIN: ICD-10-CM

## 2025-09-03 DIAGNOSIS — E11.42 DIABETIC POLYNEUROPATHY ASSOCIATED WITH TYPE 2 DIABETES MELLITUS: ICD-10-CM

## 2025-09-03 DIAGNOSIS — M48.02 CERVICAL STENOSIS OF SPINE: ICD-10-CM

## 2025-09-03 DIAGNOSIS — M50.30 DDD (DEGENERATIVE DISC DISEASE), CERVICAL: ICD-10-CM

## 2025-09-03 DIAGNOSIS — M70.62 GREATER TROCHANTERIC BURSITIS OF LEFT HIP: ICD-10-CM

## 2025-09-03 DIAGNOSIS — I10 PRIMARY HYPERTENSION: Primary | ICD-10-CM

## 2025-09-03 DIAGNOSIS — Z00.00 ENCOUNTER FOR MEDICARE ANNUAL WELLNESS EXAM: Primary | ICD-10-CM

## 2025-09-03 DIAGNOSIS — E78.5 HYPERLIPIDEMIA, UNSPECIFIED HYPERLIPIDEMIA TYPE: ICD-10-CM

## 2025-09-03 DIAGNOSIS — I10 PRIMARY HYPERTENSION: ICD-10-CM

## 2025-09-03 PROCEDURE — 99999 PR PBB SHADOW E&M-EST. PATIENT-LVL IV: CPT | Mod: PBBFAC,HCNC,, | Performed by: PHYSICIAN ASSISTANT

## 2025-09-03 RX ORDER — METFORMIN HYDROCHLORIDE 1000 MG/1
1000 TABLET ORAL 2 TIMES DAILY WITH MEALS
Qty: 180 TABLET | Refills: 1 | Status: SHIPPED | OUTPATIENT
Start: 2025-09-03

## 2025-09-03 RX ORDER — LISINOPRIL AND HYDROCHLOROTHIAZIDE 12.5; 2 MG/1; MG/1
TABLET ORAL
Qty: 90 TABLET | Refills: 1 | Status: SHIPPED | OUTPATIENT
Start: 2025-09-03 | End: 2028-09-03

## 2025-09-03 RX ORDER — ROSUVASTATIN CALCIUM 20 MG/1
20 TABLET, COATED ORAL DAILY
Qty: 90 TABLET | Refills: 3 | Status: SHIPPED | OUTPATIENT
Start: 2025-09-03 | End: 2026-09-03

## 2025-09-03 RX ORDER — TIRZEPATIDE 12.5 MG/.5ML
12.5 INJECTION, SOLUTION SUBCUTANEOUS
Qty: 4 PEN | Refills: 2 | Status: SHIPPED | OUTPATIENT
Start: 2025-09-03